# Patient Record
Sex: FEMALE | Race: WHITE
[De-identification: names, ages, dates, MRNs, and addresses within clinical notes are randomized per-mention and may not be internally consistent; named-entity substitution may affect disease eponyms.]

---

## 2021-05-06 ENCOUNTER — TRANSCRIPTION ENCOUNTER (OUTPATIENT)
Age: 31
End: 2021-05-06

## 2021-05-19 ENCOUNTER — EMERGENCY (EMERGENCY)
Facility: HOSPITAL | Age: 31
LOS: 0 days | Discharge: HOME | End: 2021-05-19
Attending: EMERGENCY MEDICINE
Payer: COMMERCIAL

## 2021-05-19 VITALS — HEIGHT: 63 IN | WEIGHT: 134.92 LBS

## 2021-05-19 VITALS
DIASTOLIC BLOOD PRESSURE: 79 MMHG | HEART RATE: 95 BPM | RESPIRATION RATE: 16 BRPM | OXYGEN SATURATION: 100 % | SYSTOLIC BLOOD PRESSURE: 142 MMHG | TEMPERATURE: 99 F

## 2021-05-19 DIAGNOSIS — M79.10 MYALGIA, UNSPECIFIED SITE: ICD-10-CM

## 2021-05-19 DIAGNOSIS — R53.83 OTHER FATIGUE: ICD-10-CM

## 2021-05-19 LAB
ALBUMIN SERPL ELPH-MCNC: 4.3 G/DL — SIGNIFICANT CHANGE UP (ref 3.3–5)
ALP SERPL-CCNC: 32 U/L — LOW (ref 40–120)
ALT FLD-CCNC: 58 U/L — SIGNIFICANT CHANGE UP (ref 12–78)
ANION GAP SERPL CALC-SCNC: 16 MMOL/L — SIGNIFICANT CHANGE UP (ref 5–17)
APTT BLD: 26 SEC — LOW (ref 27.5–35.5)
AST SERPL-CCNC: 82 U/L — HIGH (ref 15–37)
BASOPHILS # BLD AUTO: 0.06 K/UL — SIGNIFICANT CHANGE UP (ref 0–0.2)
BASOPHILS NFR BLD AUTO: 1.2 % — SIGNIFICANT CHANGE UP (ref 0–2)
BILIRUB SERPL-MCNC: 0.6 MG/DL — SIGNIFICANT CHANGE UP (ref 0.2–1.2)
BUN SERPL-MCNC: 9 MG/DL — SIGNIFICANT CHANGE UP (ref 7–23)
CALCIUM SERPL-MCNC: 9 MG/DL — SIGNIFICANT CHANGE UP (ref 8.5–10.1)
CHLORIDE SERPL-SCNC: 105 MMOL/L — SIGNIFICANT CHANGE UP (ref 96–108)
CK SERPL-CCNC: 72 U/L — SIGNIFICANT CHANGE UP (ref 26–192)
CO2 SERPL-SCNC: 17 MMOL/L — LOW (ref 22–31)
CREAT SERPL-MCNC: 0.62 MG/DL — SIGNIFICANT CHANGE UP (ref 0.5–1.3)
EOSINOPHIL # BLD AUTO: 0.08 K/UL — SIGNIFICANT CHANGE UP (ref 0–0.5)
EOSINOPHIL NFR BLD AUTO: 1.6 % — SIGNIFICANT CHANGE UP (ref 0–6)
GLUCOSE SERPL-MCNC: 81 MG/DL — SIGNIFICANT CHANGE UP (ref 70–99)
HCG SERPL-ACNC: <1 MIU/ML — SIGNIFICANT CHANGE UP
HCT VFR BLD CALC: 36.9 % — SIGNIFICANT CHANGE UP (ref 34.5–45)
HGB BLD-MCNC: 12.8 G/DL — SIGNIFICANT CHANGE UP (ref 11.5–15.5)
IMM GRANULOCYTES NFR BLD AUTO: 0.2 % — SIGNIFICANT CHANGE UP (ref 0–1.5)
INR BLD: 1.04 RATIO — SIGNIFICANT CHANGE UP (ref 0.88–1.16)
LYMPHOCYTES # BLD AUTO: 1.16 K/UL — SIGNIFICANT CHANGE UP (ref 1–3.3)
LYMPHOCYTES # BLD AUTO: 22.7 % — SIGNIFICANT CHANGE UP (ref 13–44)
MCHC RBC-ENTMCNC: 34.7 GM/DL — SIGNIFICANT CHANGE UP (ref 32–36)
MCHC RBC-ENTMCNC: 34.7 PG — HIGH (ref 27–34)
MCV RBC AUTO: 100 FL — SIGNIFICANT CHANGE UP (ref 80–100)
MONOCYTES # BLD AUTO: 0.55 K/UL — SIGNIFICANT CHANGE UP (ref 0–0.9)
MONOCYTES NFR BLD AUTO: 10.8 % — SIGNIFICANT CHANGE UP (ref 2–14)
NEUTROPHILS # BLD AUTO: 3.24 K/UL — SIGNIFICANT CHANGE UP (ref 1.8–7.4)
NEUTROPHILS NFR BLD AUTO: 63.5 % — SIGNIFICANT CHANGE UP (ref 43–77)
PLATELET # BLD AUTO: 191 K/UL — SIGNIFICANT CHANGE UP (ref 150–400)
POTASSIUM SERPL-MCNC: 3.3 MMOL/L — LOW (ref 3.5–5.3)
POTASSIUM SERPL-SCNC: 3.3 MMOL/L — LOW (ref 3.5–5.3)
PROT SERPL-MCNC: 7.9 GM/DL — SIGNIFICANT CHANGE UP (ref 6–8.3)
PROTHROM AB SERPL-ACNC: 12.2 SEC — SIGNIFICANT CHANGE UP (ref 10.6–13.6)
RBC # BLD: 3.69 M/UL — LOW (ref 3.8–5.2)
RBC # FLD: 11.9 % — SIGNIFICANT CHANGE UP (ref 10.3–14.5)
SODIUM SERPL-SCNC: 138 MMOL/L — SIGNIFICANT CHANGE UP (ref 135–145)
TSH SERPL-MCNC: 1.62 UU/ML — SIGNIFICANT CHANGE UP (ref 0.34–4.82)
WBC # BLD: 5.1 K/UL — SIGNIFICANT CHANGE UP (ref 3.8–10.5)
WBC # FLD AUTO: 5.1 K/UL — SIGNIFICANT CHANGE UP (ref 3.8–10.5)

## 2021-05-19 RX ORDER — SODIUM CHLORIDE 9 MG/ML
1000 INJECTION INTRAMUSCULAR; INTRAVENOUS; SUBCUTANEOUS ONCE
Refills: 0 | Status: COMPLETED | OUTPATIENT
Start: 2021-05-19 | End: 2021-05-19

## 2021-05-19 RX ORDER — POTASSIUM CHLORIDE 20 MEQ
20 PACKET (EA) ORAL ONCE
Refills: 0 | Status: COMPLETED | OUTPATIENT
Start: 2021-05-19 | End: 2021-05-19

## 2021-05-19 RX ORDER — POTASSIUM CHLORIDE 20 MEQ
40 PACKET (EA) ORAL ONCE
Refills: 0 | Status: DISCONTINUED | OUTPATIENT
Start: 2021-05-19 | End: 2021-05-19

## 2021-05-19 RX ADMIN — Medication 20 MILLIEQUIVALENT(S): at 13:53

## 2021-05-19 RX ADMIN — SODIUM CHLORIDE 1000 MILLILITER(S): 9 INJECTION INTRAMUSCULAR; INTRAVENOUS; SUBCUTANEOUS at 13:02

## 2021-05-19 RX ADMIN — SODIUM CHLORIDE 1000 MILLILITER(S): 9 INJECTION INTRAMUSCULAR; INTRAVENOUS; SUBCUTANEOUS at 13:55

## 2021-05-19 NOTE — ED STATDOCS - PATIENT PORTAL LINK FT
You can access the FollowMyHealth Patient Portal offered by [facility] by registering at the following website: [email or web address]. By joining [facility]’s FollowMyHealth portal, you will also be able to view your health information using other applications (apps) compatible with our system.

## 2021-05-19 NOTE — ED STATDOCS - OBJECTIVE STATEMENT
32 y/o female with no significant PMHx presents to the ED for evaluation. Pt reports she received the second COVID vaccine on 5/1 and since then has not been feeling well. Pt c/o fatigue, body aches, chills. Also random bruising. No trauma. Denies rash. On birth control. No known tick exposure. No other complaints at this time. PCP: Dr. Stevens.

## 2021-05-19 NOTE — ED ADULT TRIAGE NOTE - CHIEF COMPLAINT QUOTE
Pt A/Ox3. Pt states "I received the 2nd COVID vaccine on 5/1 and have been experiencing aches, pain, chills, random bruising, and sweating." Pt denies fever or contact with COVID.

## 2021-05-19 NOTE — ED STATDOCS - PROGRESS NOTE DETAILS
30 y/o F with no PMHx presents to the ED for evaluation. Pt reports she received the second COVID vaccine on 5/1 and since then has not been feeling well. Pt c/o fatigue, body aches, chills. Also random bruising. No trauma. Denies rash. On birth control. No known tick exposure. No other complaints at this time. PCP: Dr. Stevens.  PE: lungs CTA b/l, heart RRR s1/s2, strength and sensation intact b/l UE and LE  Plan: labs, TSH, labs with K+ 3.3, supplemented other labs WNL, pt advised that tick disease panel sent out will be called with positive results.  Pt currently feeling well, will d/c home with outpt f/u with rheum, pt agreeable to d/c and plan of care  Jazmin Lambert PA-C

## 2021-05-19 NOTE — ED ADULT NURSE NOTE - OBJECTIVE STATEMENT
pt presents to ED c/o malaise, fatigue, body aches, chills x 2 weeks. received 2nd dose of COVID vaccine 5/1. + bruising w/ unknown etiology. denies fevers.

## 2021-05-19 NOTE — ED STATDOCS - NSFOLLOWUPINSTRUCTIONS_ED_ALL_ED_FT
Fatigue    WHAT YOU NEED TO KNOW:    Fatigue is mental and physical exhaustion that does not get better with rest. Fatigue may make daily activities difficult or cause extreme sleepiness. It is normal to feel tired sometimes, but long-term fatigue may be a sign of serious illness.    DISCHARGE INSTRUCTIONS:    Return to the emergency department if:   •You have chest pain.       •You have difficulty breathing.       Contact your healthcare provider if:   •You have a cough that gets worse, or does not go away.       •You see blood in your urine or bowel movement.       •You have numbness or tingling around your mouth or in an arm or leg.       •You faint, feel dizzy, or have vision changes.       •You have swelling in your lymph nodes.       •You are a woman and have vaginal bleeding that is not normal for you, or is not expected.       •You lose weight without trying, or you have trouble eating.       •You feel weak or have muscle pain.       •You have pain or swelling in your joints.      •You have questions or concerns about your condition or care.       Follow up with your healthcare provider as directed: You may need more tests. Your healthcare provider may also refer you to a specialist. Write down your questions so you remember to ask them during your visits.    Manage fatigue:   •Keep a fatigue diary. Include anything that makes you feel more tired or less tired. Bring the diary with you to follow-up visits with your provider.      •Exercise as directed. Exercise can help you feel more alert. Exercise can also help you manage stress or relieve depression. Try to get at least 30 minutes of exercise most days of the week.  [employer] for Exercise           •Keep a regular sleep schedule. Go to bed and wake up at the same times every day. Limit naps to 1 hour each day. A nap can improve fatigue, but a long nap may make it harder to go to sleep at night.      •Plan and limit your activities. Limit the number of activities such as shopping and cleaning you do each day. If possible, try to spread out your trips throughout the week. Plan ahead so you are not rushing to get something done. Only do activities that you have the energy to complete. Take breaks between activities. Ask for help if you need it. Another person may be able to drive you or help with daily activities.      •Eat a variety of healthy foods. Healthy foods include fruits, vegetables, whole-grain breads, low-fat dairy products, beans, lean meats, and fish. Good nutrition can help manage fatigue.  Healthy Foods           •Limit caffeine and alcohol. These can make it difficult to fall or stay asleep. Women should limit alcohol to 1 drink a day. Men should limit alcohol to 2 drinks a day. A drink of alcohol is 12 ounces of beer, 5 ounces of wine, or 1½ ounces of liquor. Ask our healthcare provider how much caffeine is safe for you.      •Do not smoke. Nicotine and other chemicals in cigarettes and cigars can cause lung damage and increase fatigue. Ask your healthcare provider for information if you currently smoke and need help to quit. E-cigarettes or smokeless tobacco still contain nicotine. Talk to your healthcare provider before you use these products.

## 2021-05-21 LAB
A PHAGOCYTOPH IGG TITR SER IF: SIGNIFICANT CHANGE UP TITER
B BURGDOR AB SER QL IA: NEGATIVE — SIGNIFICANT CHANGE UP
B MICROTI IGG TITR SER: SIGNIFICANT CHANGE UP TITER
E CHAFFEENSIS IGG TITR SER IF: SIGNIFICANT CHANGE UP TITER

## 2021-05-24 PROBLEM — Z78.9 OTHER SPECIFIED HEALTH STATUS: Chronic | Status: ACTIVE | Noted: 2021-05-20

## 2021-05-25 PROBLEM — Z00.00 ENCOUNTER FOR PREVENTIVE HEALTH EXAMINATION: Status: ACTIVE | Noted: 2021-05-25

## 2021-05-26 ENCOUNTER — RESULT REVIEW (OUTPATIENT)
Age: 31
End: 2021-05-26

## 2021-05-26 ENCOUNTER — LABORATORY RESULT (OUTPATIENT)
Age: 31
End: 2021-05-26

## 2021-05-26 ENCOUNTER — NON-APPOINTMENT (OUTPATIENT)
Age: 31
End: 2021-05-26

## 2021-05-26 ENCOUNTER — APPOINTMENT (OUTPATIENT)
Dept: RHEUMATOLOGY | Facility: CLINIC | Age: 31
End: 2021-05-26
Payer: COMMERCIAL

## 2021-05-26 VITALS
WEIGHT: 135 LBS | HEART RATE: 98 BPM | OXYGEN SATURATION: 98 % | SYSTOLIC BLOOD PRESSURE: 138 MMHG | DIASTOLIC BLOOD PRESSURE: 89 MMHG | TEMPERATURE: 98 F

## 2021-05-26 DIAGNOSIS — M79.675 PAIN IN RIGHT TOE(S): ICD-10-CM

## 2021-05-26 DIAGNOSIS — Z78.9 OTHER SPECIFIED HEALTH STATUS: ICD-10-CM

## 2021-05-26 DIAGNOSIS — E73.9 LACTOSE INTOLERANCE, UNSPECIFIED: ICD-10-CM

## 2021-05-26 DIAGNOSIS — M25.50 PAIN IN UNSPECIFIED JOINT: ICD-10-CM

## 2021-05-26 DIAGNOSIS — M19.049 PRIMARY OSTEOARTHRITIS, UNSPECIFIED HAND: ICD-10-CM

## 2021-05-26 DIAGNOSIS — R14.0 ABDOMINAL DISTENSION (GASEOUS): ICD-10-CM

## 2021-05-26 DIAGNOSIS — R63.0 ANOREXIA: ICD-10-CM

## 2021-05-26 DIAGNOSIS — M54.5 LOW BACK PAIN: ICD-10-CM

## 2021-05-26 DIAGNOSIS — R68.2 DRY MOUTH, UNSPECIFIED: ICD-10-CM

## 2021-05-26 DIAGNOSIS — R61 GENERALIZED HYPERHIDROSIS: ICD-10-CM

## 2021-05-26 DIAGNOSIS — M79.674 PAIN IN RIGHT TOE(S): ICD-10-CM

## 2021-05-26 DIAGNOSIS — Z87.19 PERSONAL HISTORY OF OTHER DISEASES OF THE DIGESTIVE SYSTEM: ICD-10-CM

## 2021-05-26 DIAGNOSIS — Z82.0 FAMILY HISTORY OF EPILEPSY AND OTHER DISEASES OF THE NERVOUS SYSTEM: ICD-10-CM

## 2021-05-26 RX ORDER — LEVONORGESTREL AND ETHINYL ESTRADIOL AND ETHINYL ESTRADIOL 150-30(84)
KIT ORAL
Refills: 0 | Status: ACTIVE | COMMUNITY

## 2021-05-26 NOTE — PHYSICAL EXAM
[General Appearance - Alert] : alert [General Appearance - In No Acute Distress] : in no acute distress [Sclera] : the sclera and conjunctiva were normal [Extraocular Movements] : extraocular movements were intact [Outer Ear] : the ears and nose were normal in appearance [Neck Appearance] : the appearance of the neck was normal [Respiration, Rhythm And Depth] : normal respiratory rhythm and effort [Heart Rate And Rhythm] : heart rate was normal and rhythm regular [Heart Sounds] : normal S1 and S2 [Abdomen Soft] : soft [Abdomen Tenderness] : non-tender [Cervical Lymph Nodes Enlarged Anterior Bilaterally] : anterior cervical [Supraclavicular Lymph Nodes Enlarged Bilaterally] : supraclavicular [No CVA Tenderness] : no ~M costovertebral angle tenderness [Motor Tone] : muscle strength and tone were normal [] : no rash [Deep Tendon Reflexes (DTR)] : deep tendon reflexes were 2+ and symmetric [Sensation] : the sensory exam was normal to light touch and pinprick [Impaired Insight] : insight and judgment were intact [Mood] : the mood was normal [FreeTextEntry1] : no synovitis or effusion on exam noted today; full ROM in b/l shoulders; + multiple tender points incld sternum; upper/lower back;elbows;hips; medial knee,etc

## 2021-05-26 NOTE — ASSESSMENT
[FreeTextEntry1] : 31-year-old female, here for the first time w/ reports of generalized aches/pain all over w/ joint aches in the hands, feet and LBP to rule out inflammatory arthropathy incld RA, seronegative spondyloarthropathy.\par Will keep fibromyalgia as diagnosis of exclusion and does have some + tender points.\par Reports of daily night sweats w/ loss of appetite and given heme/onc referral as requested.  \par -No synovitis or effusion on exam noted today and advised to monitor.\par -labs as below incld ESR, CRP, serologies, lyme, TSH, etc \par -Referred for xray of b/l hands to evaluate for structural changes, r/o periarticular osteopenia/RA, r/o erosions\par -Referred for xray of b/l feet to evaluate for structural changes, r/o erosions\par \par LBP: intermittent \par -Referred for xray of LS spine to evaluate for structural changes, DDD, confirm SI normal \par -Advil PRN w/ food needed sparingly helps\par \par Abdominal bloating: check celiac panel\par -reports hx of IBS\par \par Intermittent numbness/tingling: intermittent in hands. Advised to monitor for CTS symptoms in the hands.\par -check metabolic labs incld folate, B12, TSH, HbA1C for it.\par -if persistent can consider EMG w/ Neuro\par \par Fibromyalgia: + tender points present \par -will plan for gabapentin or cymbalta pending labs\par -encouraged gentle exercises as tolerated\par \par -educated on symptoms to monitor for in detail and alert us if any concerns.\par -knows to stay up to date on health maintenance w/ PCP\par -f/u in 10-14 days w/ labs, xrays please\par

## 2021-05-26 NOTE — HISTORY OF PRESENT ILLNESS
[FreeTextEntry1] : 31-year-old female here for the first time. Patient states she notices generalized aches and pains all over and more so for the past month.\par Patient states she notices aches and pains throughout both of her hands on a daily basis.\par States she'll monitor for any swellling.\par States she'll monitor for any stiffness.\par States she notices intermittent pain in both of her feet.\par States she notices lower back pain worse with standing sitting or bending; better with resting. Denies any loss of bladder or bowel incontinence or saddle anesthesias.\par Reports she can notice some intermittent tingling in her hands and will monitor if it spares the fifth digit as seen in carpal tunnel syndrome.\par States she notices abdominal bloating. States she does have history of IBS. Denies any history of IBD or blood in the stools.\par States she notices daily night sweats. States she notices loss of appetite. Denies any fevers per se and has been checking she reports.\par Denies any fever/chills, no rashes, no ulcers, no dry eyes, + dry mouth at times, no raynaud's, no infectious diarrhea or  symptoms at this time.\par

## 2021-06-04 LAB
25(OH)D3 SERPL-MCNC: 24.2 NG/ML
ACE BLD-CCNC: 27 U/L
ALBUMIN SERPL ELPH-MCNC: 5.3 G/DL
ALP BLD-CCNC: 32 U/L
ALT SERPL-CCNC: 42 U/L
ANA SER IF-ACNC: NEGATIVE
ANION GAP SERPL CALC-SCNC: 19 MMOL/L
AST SERPL-CCNC: 64 U/L
B BURGDOR AB SER-IMP: NEGATIVE
B BURGDOR IGM PATRN SER IB-IMP: NEGATIVE
B BURGDOR18KD IGG SER QL IB: NORMAL
B BURGDOR23KD IGG SER QL IB: NORMAL
B BURGDOR23KD IGM SER QL IB: NORMAL
B BURGDOR28KD IGG SER QL IB: NORMAL
B BURGDOR30KD IGG SER QL IB: NORMAL
B BURGDOR31KD IGG SER QL IB: NORMAL
B BURGDOR39KD IGG SER QL IB: NORMAL
B BURGDOR39KD IGM SER QL IB: NORMAL
B BURGDOR41KD IGG SER QL IB: PRESENT
B BURGDOR41KD IGM SER QL IB: PRESENT
B BURGDOR45KD IGG SER QL IB: NORMAL
B BURGDOR58KD IGG SER QL IB: NORMAL
B BURGDOR66KD IGG SER QL IB: NORMAL
B BURGDOR93KD IGG SER QL IB: NORMAL
B19V IGG SER QL IA: 8.05 INDEX
B19V IGG+IGM SER-IMP: NORMAL
B19V IGG+IGM SER-IMP: POSITIVE
B19V IGM FLD-ACNC: 0.11 INDEX
B19V IGM SER-ACNC: NEGATIVE
BASOPHILS # BLD AUTO: 0.08 K/UL
BASOPHILS NFR BLD AUTO: 1.6 %
BILIRUB SERPL-MCNC: 0.4 MG/DL
BUN SERPL-MCNC: 9 MG/DL
C TRACH RRNA SPEC QL NAA+PROBE: NOT DETECTED
CALCIUM SERPL-MCNC: 9.1 MG/DL
CCP AB SER IA-ACNC: <8 UNITS
CHLORIDE SERPL-SCNC: 101 MMOL/L
CK SERPL-CCNC: 88 U/L
CO2 SERPL-SCNC: 17 MMOL/L
CREAT SERPL-MCNC: 0.45 MG/DL
CRP SERPL-MCNC: <3 MG/L
ENA RNP AB SER IA-ACNC: <0.2 AL
ENA SM AB SER IA-ACNC: <0.2 AL
ENA SS-A AB SER IA-ACNC: <0.2 AL
ENA SS-B AB SER IA-ACNC: <0.2 AL
ENDOMYSIUM IGA SER QL: NEGATIVE
ENDOMYSIUM IGA TITR SER: NORMAL
EOSINOPHIL # BLD AUTO: 0.1 K/UL
EOSINOPHIL NFR BLD AUTO: 2 %
ERYTHROCYTE [SEDIMENTATION RATE] IN BLOOD BY WESTERGREN METHOD: 2 MM/HR
ESTIMATED AVERAGE GLUCOSE: 97 MG/DL
FOLATE SERPL-MCNC: 2.5 NG/ML
G6PD SER-CCNC: 13.5 U/G HGB
GLIADIN IGA SER QL: 9.4 UNITS
GLIADIN IGG SER QL: <5 UNITS
GLIADIN PEPTIDE IGA SER-ACNC: NEGATIVE
GLIADIN PEPTIDE IGG SER-ACNC: NEGATIVE
GLUCOSE SERPL-MCNC: 89 MG/DL
HAV IGM SER QL: NONREACTIVE
HBA1C MFR BLD HPLC: 5 %
HBV CORE IGM SER QL: NONREACTIVE
HBV SURFACE AG SER QL: NONREACTIVE
HCT VFR BLD CALC: 38.9 %
HCV AB SER QL: NONREACTIVE
HCV S/CO RATIO: 0.38 S/CO
HGB BLD-MCNC: 12.8 G/DL
IMM GRANULOCYTES NFR BLD AUTO: 0.2 %
LYMPHOCYTES # BLD AUTO: 1.35 K/UL
LYMPHOCYTES NFR BLD AUTO: 26.7 %
MAN DIFF?: NORMAL
MCHC RBC-ENTMCNC: 32.9 GM/DL
MCHC RBC-ENTMCNC: 35.1 PG
MCV RBC AUTO: 106.6 FL
MONOCYTES # BLD AUTO: 0.53 K/UL
MONOCYTES NFR BLD AUTO: 10.5 %
N GONORRHOEA RRNA SPEC QL NAA+PROBE: NOT DETECTED
NEUTROPHILS # BLD AUTO: 2.98 K/UL
NEUTROPHILS NFR BLD AUTO: 59 %
PLATELET # BLD AUTO: 207 K/UL
POTASSIUM SERPL-SCNC: 3.9 MMOL/L
PROT SERPL-MCNC: 7.7 G/DL
RBC # BLD: 3.65 M/UL
RBC # FLD: 12.8 %
RF+CCP IGG SER-IMP: NEGATIVE
RHEUMATOID FACT SER QL: <10 IU/ML
SODIUM SERPL-SCNC: 137 MMOL/L
SOURCE AMPLIFICATION: NORMAL
TSH SERPL-ACNC: 2.83 UIU/ML
TTG IGA SER IA-ACNC: <1.2 U/ML
TTG IGA SER-ACNC: NEGATIVE
TTG IGG SER IA-ACNC: 1.3 U/ML
TTG IGG SER IA-ACNC: NEGATIVE
VIT B12 SERPL-MCNC: 167 PG/ML
WBC # FLD AUTO: 5.05 K/UL

## 2021-06-08 ENCOUNTER — APPOINTMENT (OUTPATIENT)
Dept: RADIOLOGY | Facility: CLINIC | Age: 31
End: 2021-06-08
Payer: COMMERCIAL

## 2021-06-08 ENCOUNTER — OUTPATIENT (OUTPATIENT)
Dept: OUTPATIENT SERVICES | Facility: HOSPITAL | Age: 31
LOS: 1 days | End: 2021-06-08
Payer: COMMERCIAL

## 2021-06-08 DIAGNOSIS — M19.049 PRIMARY OSTEOARTHRITIS, UNSPECIFIED HAND: ICD-10-CM

## 2021-06-08 DIAGNOSIS — M54.5 LOW BACK PAIN: ICD-10-CM

## 2021-06-09 LAB — HLA-B27 RELATED AG QL: NEGATIVE

## 2021-06-14 ENCOUNTER — APPOINTMENT (OUTPATIENT)
Dept: RHEUMATOLOGY | Facility: CLINIC | Age: 31
End: 2021-06-14
Payer: COMMERCIAL

## 2021-06-14 VITALS
HEIGHT: 63 IN | WEIGHT: 135 LBS | SYSTOLIC BLOOD PRESSURE: 122 MMHG | BODY MASS INDEX: 23.92 KG/M2 | OXYGEN SATURATION: 96 % | HEART RATE: 103 BPM | TEMPERATURE: 96.6 F | DIASTOLIC BLOOD PRESSURE: 80 MMHG

## 2021-06-14 DIAGNOSIS — E53.8 DEFICIENCY OF OTHER SPECIFIED B GROUP VITAMINS: ICD-10-CM

## 2021-06-14 DIAGNOSIS — R74.01 ELEVATION OF LEVELS OF LIVER TRANSAMINASE LEVELS: ICD-10-CM

## 2021-06-14 RX ORDER — ERGOCALCIFEROL 1.25 MG/1
1.25 MG CAPSULE, LIQUID FILLED ORAL
Qty: 4 | Refills: 1 | Status: ACTIVE | COMMUNITY
Start: 2021-06-14 | End: 1900-01-01

## 2021-06-14 NOTE — REASON FOR VISIT
[Follow-Up: _____] : a [unfilled] follow-up visit [FreeTextEntry1] : generalized aches/pain; review labs/xrays/meds

## 2021-06-14 NOTE — ASSESSMENT
[FreeTextEntry1] : 31-year-old female, here for first follow up w/ hx of IBS reports of generalized aches/pain all over w/ +tender points of fibromyalgia at this time.\par She is noted to have transaminitis, Low B12, low folate and low vit D on recent labs\par -No synovitis or effusion on exam noted today and advised to monitor.\par -reviewed labs 5/26/2021 w/ low B12= 167; low folate= 2.5; low vitD =24.2; high AST=64 (from 40); Nl CPK; serologies all within normal limits at this time \par -reviewed xray b/l hands 6/8/21 normal\par -reviewed xray b/l feet 6/8/21 normal \par -reviewed xray LS spine 6/8/2021 normal; SI normal \par \par Abdominal bloating: reviewed celiac panel normal\par -reports hx of IBS\par \par Intermittent numbness/tingling: intermittent in hands. Advised to monitor for CTS symptoms in the hands.\par -reviewed metabolic labs 5/26/2021 w/ low folate, low B12, Nl TSH, NL HbA1C for it.\par -if persistent can consider EMG w/ Neuro\par \par Low B12 <400 on labs\par -discussed r/b/s of B12 2500mcg PO daily w/ pt agreeable and prescription sent as below; discussed if still low she can consider IM injections w/ her PCP\par \par low folate: discussed r/b/s of folate 1 mg daily and will monitor level on f/u then\par \par Hypovitaminosis D: low vitD w/ prescription for vitD repletion sent as discussed.\par \par Transaminitis: raised AST=64\par -asymptomatic \par -discussed to stop tylenol and NSAIDs and alcohol and repeat off it \par \par Fibromyalgia: + tender points present \par -will plan for gabapentin or cymbalta pending labs\par -encouraged gentle exercises as tolerated\par \par -educated on symptoms to monitor for in detail and alert us if any concerns.\par -knows to stay up to date on health maintenance w/ PCP\par -f/u in 4-6 weeks w/ labs or sooner as needed \par . \par

## 2021-06-14 NOTE — HISTORY OF PRESENT ILLNESS
[FreeTextEntry1] : 31-year-old female here for first follow up to review labs and xrays. Patient states she notices generalized aches and pains all over and more so for the past few months.\par States she was taking tylenol and Advil for pain. States she drinks alcohol once a week on the weekends. Denies any RUQ pain; no N/V. \par Denies any swelling or redness or warmth of any joints.\par States she notices intermittent pain in both of her feet.\par States she notices lower back pain worse with standing sitting or bending; better with resting. Denies any loss of bladder or bowel incontinence or saddle anesthesias.\par Reports she can notice some intermittent tingling in her hands and will monitor if it spares the fifth digit as seen in carpal tunnel syndrome.\par States she notices abdominal bloating. States she does have history of IBS. Denies any history of IBD or blood in the stools.\par Denies any fever/chills, no rashes, no ulcers, no dry eyes, + dry mouth at times, no raynaud's, no infectious diarrhea or  symptoms at this time.\par \par

## 2021-08-03 LAB
25(OH)D3 SERPL-MCNC: 28 NG/ML
ALBUMIN SERPL ELPH-MCNC: 5 G/DL
ALP BLD-CCNC: 33 U/L
ALT SERPL-CCNC: 19 U/L
ANION GAP SERPL CALC-SCNC: 16 MMOL/L
AST SERPL-CCNC: 28 U/L
BILIRUB SERPL-MCNC: 0.4 MG/DL
BUN SERPL-MCNC: 6 MG/DL
CALCIUM SERPL-MCNC: 10 MG/DL
CHLORIDE SERPL-SCNC: 103 MMOL/L
CK SERPL-CCNC: 82 U/L
CO2 SERPL-SCNC: 20 MMOL/L
CREAT SERPL-MCNC: 0.58 MG/DL
FOLATE SERPL-MCNC: >20 NG/ML
GLUCOSE SERPL-MCNC: 104 MG/DL
POTASSIUM SERPL-SCNC: 4.3 MMOL/L
PROT SERPL-MCNC: 7.6 G/DL
SODIUM SERPL-SCNC: 138 MMOL/L
VIT B12 SERPL-MCNC: 741 PG/ML

## 2021-08-04 ENCOUNTER — APPOINTMENT (OUTPATIENT)
Dept: RHEUMATOLOGY | Facility: CLINIC | Age: 31
End: 2021-08-04
Payer: COMMERCIAL

## 2021-08-04 VITALS
HEART RATE: 101 BPM | WEIGHT: 135 LBS | HEIGHT: 63 IN | DIASTOLIC BLOOD PRESSURE: 93 MMHG | OXYGEN SATURATION: 99 % | TEMPERATURE: 97.1 F | BODY MASS INDEX: 23.92 KG/M2 | SYSTOLIC BLOOD PRESSURE: 150 MMHG

## 2021-08-04 DIAGNOSIS — E55.9 VITAMIN D DEFICIENCY, UNSPECIFIED: ICD-10-CM

## 2021-08-04 DIAGNOSIS — R20.0 ANESTHESIA OF SKIN: ICD-10-CM

## 2021-08-04 DIAGNOSIS — M79.7 FIBROMYALGIA: ICD-10-CM

## 2021-08-04 DIAGNOSIS — R20.2 ANESTHESIA OF SKIN: ICD-10-CM

## 2021-08-04 RX ORDER — CYANOCOBALAMIN (VITAMIN B-12) 2500 MCG
2500 TABLET, SUBLINGUAL SUBLINGUAL
Qty: 30 | Refills: 1 | Status: DISCONTINUED | COMMUNITY
Start: 2021-06-14 | End: 2021-08-04

## 2021-08-04 RX ORDER — FOLIC ACID 1 MG/1
1 TABLET ORAL DAILY
Qty: 30 | Refills: 1 | Status: DISCONTINUED | COMMUNITY
Start: 2021-06-14 | End: 2021-08-04

## 2021-08-04 RX ORDER — ERGOCALCIFEROL 1.25 MG/1
1.25 MG CAPSULE, LIQUID FILLED ORAL
Qty: 4 | Refills: 1 | Status: ACTIVE | COMMUNITY
Start: 2021-08-04 | End: 1900-01-01

## 2021-08-04 RX ORDER — GABAPENTIN 100 MG/1
100 CAPSULE ORAL
Qty: 90 | Refills: 1 | Status: ACTIVE | COMMUNITY
Start: 2021-08-04 | End: 1900-01-01

## 2021-08-04 NOTE — PHYSICAL EXAM
[General Appearance - Alert] : alert [General Appearance - In No Acute Distress] : in no acute distress [Sclera] : the sclera and conjunctiva were normal [Extraocular Movements] : extraocular movements were intact [Outer Ear] : the ears and nose were normal in appearance [Neck Appearance] : the appearance of the neck was normal [Respiration, Rhythm And Depth] : normal respiratory rhythm and effort [Heart Rate And Rhythm] : heart rate was normal and rhythm regular [Abdomen Soft] : soft [Heart Sounds] : normal S1 and S2 [Abdomen Tenderness] : non-tender [Cervical Lymph Nodes Enlarged Anterior Bilaterally] : anterior cervical [Supraclavicular Lymph Nodes Enlarged Bilaterally] : supraclavicular [No CVA Tenderness] : no ~M costovertebral angle tenderness [Motor Tone] : muscle strength and tone were normal [] : no rash [No Focal Deficits] : no focal deficits [Impaired Insight] : insight and judgment were intact [Mood] : the mood was normal [FreeTextEntry1] : no synovitis or effusion on exam noted today; + multiple tender points incld sternum; upper/lower back;elbows;hips; medial knee,etc

## 2021-08-04 NOTE — ASSESSMENT
[FreeTextEntry1] : 31-year-old female, here for first follow up w/ hx of IBS reports of generalized aches/pain all over w/ +tender points of fibromyalgia at this time.\par -No synovitis or effusion on exam noted today and advised to monitor.\par -reviewed labs 7/28/2021 w/ low vit D; normal B12/folate now; CMP ok; Nl CPK\par -labs 5/26/2021 w/ low B12= 167; low folate= 2.5; low vitD =24.2; high AST=64 (from 40); Nl CPK; serologies all within normal limits at this time \par -reviewed xray b/l hands 6/8/21 normal\par -reviewed xray b/l feet 6/8/21 normal \par -reviewed xray LS spine 6/8/2021 normal; SI normal \par \par Abdominal bloating: reviewed celiac panel normal\par -reports hx of IBS\par \par Intermittent numbness/tingling: intermittent\par -reviewed labs 7/28/2021 w/ normal B12/folate now \par -metabolic labs 5/26/2021 w/ low folate , low B12, Nl TSH, NL HbA1C for it.\par -if persistent can consider EMG w/ Neuro\par \par Hypovitaminosis D: low vitD w/ prescription for vitD repletion sent as discussed.\par \par Transaminitis: resolved now \par -asymptomatic \par -states she decreased tylenol and NSAIDs and alcohol \par \par Fibromyalgia: + tender points present \par -discussed r/b/s of gabapentin 300 mg bedtime w/ pt agreeable and prescription sent as below\par -encouraged gentle exercises as tolerated\par \par -educated on symptoms to monitor for in detail and alert us if any concerns.\par -knows to stay up to date on health maintenance w/ PCP\par -f/u in 6-8 weeks w/ labs or sooner as needed \par

## 2021-08-04 NOTE — HISTORY OF PRESENT ILLNESS
[FreeTextEntry1] : 31-year-old female here for follow up to review labs. Patient states she notices generalized aches and pains all over and more so for the past few months.\par States she did cut down on taking tylenol and NSAIDs and alcohol w/ liver enzyme normal now. \par Denies any swelling or redness or warmth of any joints.\par States she notices intermittent pain in both of her feet.\par States she notices intermittent lower back pain worse with standing sitting or bending; better with resting though not much lately. Denies any loss of bladder or bowel incontinence or saddle anesthesias.\par Reports she can notice some intermittent tingling in her hands and will monitor if it spares the fifth digit as seen in carpal tunnel syndrome.\par States she notices abdominal bloating. States she does have history of IBS. Denies any history of IBD or blood in the stools.\par Denies any fever/chills, no rashes, no ulcers, no dry eyes, + dry mouth at times, no raynaud's, no infectious diarrhea or  symptoms at this time.\par \par \par

## 2021-10-22 ENCOUNTER — TRANSCRIPTION ENCOUNTER (OUTPATIENT)
Age: 31
End: 2021-10-22

## 2021-12-08 ENCOUNTER — APPOINTMENT (OUTPATIENT)
Dept: RHEUMATOLOGY | Facility: CLINIC | Age: 31
End: 2021-12-08

## 2022-01-28 ENCOUNTER — APPOINTMENT (OUTPATIENT)
Dept: DERMATOLOGY | Facility: CLINIC | Age: 32
End: 2022-01-28

## 2022-03-16 ENCOUNTER — TRANSCRIPTION ENCOUNTER (OUTPATIENT)
Age: 32
End: 2022-03-16

## 2022-05-12 ENCOUNTER — NON-APPOINTMENT (OUTPATIENT)
Age: 32
End: 2022-05-12

## 2022-07-23 ENCOUNTER — INPATIENT (INPATIENT)
Facility: HOSPITAL | Age: 32
LOS: 5 days | Discharge: HOME | End: 2022-07-29
Attending: INTERNAL MEDICINE | Admitting: INTERNAL MEDICINE
Payer: MEDICAID

## 2022-07-23 VITALS
SYSTOLIC BLOOD PRESSURE: 131 MMHG | HEIGHT: 63 IN | HEART RATE: 111 BPM | OXYGEN SATURATION: 100 % | TEMPERATURE: 98 F | WEIGHT: 139.99 LBS | RESPIRATION RATE: 22 BRPM | DIASTOLIC BLOOD PRESSURE: 59 MMHG

## 2022-07-23 DIAGNOSIS — D69.6 THROMBOCYTOPENIA, UNSPECIFIED: ICD-10-CM

## 2022-07-23 DIAGNOSIS — E88.89 OTHER SPECIFIED METABOLIC DISORDERS: ICD-10-CM

## 2022-07-23 DIAGNOSIS — F10.231 ALCOHOL DEPENDENCE WITH WITHDRAWAL DELIRIUM: ICD-10-CM

## 2022-07-23 DIAGNOSIS — E83.42 HYPOMAGNESEMIA: ICD-10-CM

## 2022-07-23 DIAGNOSIS — R74.01 ELEVATION OF LEVELS OF LIVER TRANSAMINASE LEVELS: ICD-10-CM

## 2022-07-23 DIAGNOSIS — Z20.822 CONTACT WITH AND (SUSPECTED) EXPOSURE TO COVID-19: ICD-10-CM

## 2022-07-23 DIAGNOSIS — G93.41 METABOLIC ENCEPHALOPATHY: ICD-10-CM

## 2022-07-23 DIAGNOSIS — E43 UNSPECIFIED SEVERE PROTEIN-CALORIE MALNUTRITION: ICD-10-CM

## 2022-07-23 DIAGNOSIS — E87.6 HYPOKALEMIA: ICD-10-CM

## 2022-07-23 DIAGNOSIS — F10.239 ALCOHOL DEPENDENCE WITH WITHDRAWAL, UNSPECIFIED: ICD-10-CM

## 2022-07-23 LAB
ALBUMIN SERPL ELPH-MCNC: 4.2 G/DL — SIGNIFICANT CHANGE UP (ref 3.3–5)
ALP SERPL-CCNC: 48 U/L — SIGNIFICANT CHANGE UP (ref 40–120)
ALT FLD-CCNC: 127 U/L — HIGH (ref 12–78)
ANION GAP SERPL CALC-SCNC: 11 MMOL/L — SIGNIFICANT CHANGE UP (ref 5–17)
APAP SERPL-MCNC: <2 UG/ML — LOW (ref 10–30)
APPEARANCE UR: CLEAR — SIGNIFICANT CHANGE UP
AST SERPL-CCNC: 373 U/L — HIGH (ref 15–37)
BASOPHILS # BLD AUTO: 0.03 K/UL — SIGNIFICANT CHANGE UP (ref 0–0.2)
BASOPHILS NFR BLD AUTO: 0.9 % — SIGNIFICANT CHANGE UP (ref 0–2)
BILIRUB SERPL-MCNC: 2.1 MG/DL — HIGH (ref 0.2–1.2)
BILIRUB UR-MCNC: ABNORMAL
BUN SERPL-MCNC: 4 MG/DL — LOW (ref 7–23)
CALCIUM SERPL-MCNC: 8.8 MG/DL — SIGNIFICANT CHANGE UP (ref 8.5–10.1)
CHLORIDE SERPL-SCNC: 98 MMOL/L — SIGNIFICANT CHANGE UP (ref 96–108)
CO2 SERPL-SCNC: 24 MMOL/L — SIGNIFICANT CHANGE UP (ref 22–31)
COLOR SPEC: YELLOW — SIGNIFICANT CHANGE UP
CREAT SERPL-MCNC: 0.49 MG/DL — LOW (ref 0.5–1.3)
DIFF PNL FLD: ABNORMAL
EGFR: 128 ML/MIN/1.73M2 — SIGNIFICANT CHANGE UP
EOSINOPHIL # BLD AUTO: 0.06 K/UL — SIGNIFICANT CHANGE UP (ref 0–0.5)
EOSINOPHIL NFR BLD AUTO: 1.7 % — SIGNIFICANT CHANGE UP (ref 0–6)
ETHANOL SERPL-MCNC: <10 MG/DL — SIGNIFICANT CHANGE UP (ref 0–10)
GLUCOSE SERPL-MCNC: 126 MG/DL — HIGH (ref 70–99)
GLUCOSE UR QL: NEGATIVE — SIGNIFICANT CHANGE UP
HCT VFR BLD CALC: 34.2 % — LOW (ref 34.5–45)
HGB BLD-MCNC: 12.1 G/DL — SIGNIFICANT CHANGE UP (ref 11.5–15.5)
IMM GRANULOCYTES NFR BLD AUTO: 0.6 % — SIGNIFICANT CHANGE UP (ref 0–1.5)
KETONES UR-MCNC: ABNORMAL
LACTATE SERPL-SCNC: 1.4 MMOL/L — SIGNIFICANT CHANGE UP (ref 0.7–2)
LEUKOCYTE ESTERASE UR-ACNC: ABNORMAL
LYMPHOCYTES # BLD AUTO: 0.49 K/UL — LOW (ref 1–3.3)
LYMPHOCYTES # BLD AUTO: 14.1 % — SIGNIFICANT CHANGE UP (ref 13–44)
MAGNESIUM SERPL-MCNC: 1 MG/DL — CRITICAL LOW (ref 1.6–2.6)
MCHC RBC-ENTMCNC: 35.4 GM/DL — SIGNIFICANT CHANGE UP (ref 32–36)
MCHC RBC-ENTMCNC: 37.5 PG — HIGH (ref 27–34)
MCV RBC AUTO: 105.9 FL — HIGH (ref 80–100)
MONOCYTES # BLD AUTO: 0.33 K/UL — SIGNIFICANT CHANGE UP (ref 0–0.9)
MONOCYTES NFR BLD AUTO: 9.5 % — SIGNIFICANT CHANGE UP (ref 2–14)
NEUTROPHILS # BLD AUTO: 2.55 K/UL — SIGNIFICANT CHANGE UP (ref 1.8–7.4)
NEUTROPHILS NFR BLD AUTO: 73.2 % — SIGNIFICANT CHANGE UP (ref 43–77)
NITRITE UR-MCNC: NEGATIVE — SIGNIFICANT CHANGE UP
PH UR: 6.5 — SIGNIFICANT CHANGE UP (ref 5–8)
PLATELET # BLD AUTO: 131 K/UL — LOW (ref 150–400)
POTASSIUM SERPL-MCNC: 3.5 MMOL/L — SIGNIFICANT CHANGE UP (ref 3.5–5.3)
POTASSIUM SERPL-SCNC: 3.5 MMOL/L — SIGNIFICANT CHANGE UP (ref 3.5–5.3)
PROT SERPL-MCNC: 7.7 GM/DL — SIGNIFICANT CHANGE UP (ref 6–8.3)
PROT UR-MCNC: 30 MG/DL
RBC # BLD: 3.23 M/UL — LOW (ref 3.8–5.2)
RBC # FLD: 11.6 % — SIGNIFICANT CHANGE UP (ref 10.3–14.5)
SALICYLATES SERPL-MCNC: <1.7 MG/DL — LOW (ref 2.8–20)
SODIUM SERPL-SCNC: 133 MMOL/L — LOW (ref 135–145)
SP GR SPEC: 1.01 — SIGNIFICANT CHANGE UP (ref 1.01–1.02)
TSH SERPL-MCNC: 4.71 UU/ML — SIGNIFICANT CHANGE UP (ref 0.34–4.82)
UROBILINOGEN FLD QL: 4
WBC # BLD: 3.48 K/UL — LOW (ref 3.8–10.5)
WBC # FLD AUTO: 3.48 K/UL — LOW (ref 3.8–10.5)

## 2022-07-23 RX ORDER — MAGNESIUM SULFATE 500 MG/ML
2 VIAL (ML) INJECTION ONCE
Refills: 0 | Status: COMPLETED | OUTPATIENT
Start: 2022-07-23 | End: 2022-07-23

## 2022-07-23 RX ORDER — ONDANSETRON 8 MG/1
4 TABLET, FILM COATED ORAL EVERY 6 HOURS
Refills: 0 | Status: DISCONTINUED | OUTPATIENT
Start: 2022-07-23 | End: 2022-07-24

## 2022-07-23 RX ORDER — CEFTRIAXONE 500 MG/1
1000 INJECTION, POWDER, FOR SOLUTION INTRAMUSCULAR; INTRAVENOUS ONCE
Refills: 0 | Status: COMPLETED | OUTPATIENT
Start: 2022-07-23 | End: 2022-07-23

## 2022-07-23 RX ORDER — FOLIC ACID 0.8 MG
1 TABLET ORAL DAILY
Refills: 0 | Status: DISCONTINUED | OUTPATIENT
Start: 2022-07-23 | End: 2022-07-24

## 2022-07-23 RX ORDER — ACETAMINOPHEN 500 MG
650 TABLET ORAL EVERY 6 HOURS
Refills: 0 | Status: DISCONTINUED | OUTPATIENT
Start: 2022-07-23 | End: 2022-07-24

## 2022-07-23 RX ORDER — SODIUM CHLORIDE 9 MG/ML
1000 INJECTION INTRAMUSCULAR; INTRAVENOUS; SUBCUTANEOUS ONCE
Refills: 0 | Status: COMPLETED | OUTPATIENT
Start: 2022-07-23 | End: 2022-07-23

## 2022-07-23 RX ORDER — THIAMINE MONONITRATE (VIT B1) 100 MG
100 TABLET ORAL DAILY
Refills: 0 | Status: DISCONTINUED | OUTPATIENT
Start: 2022-07-23 | End: 2022-07-24

## 2022-07-23 RX ORDER — KETOROLAC TROMETHAMINE 30 MG/ML
15 SYRINGE (ML) INJECTION ONCE
Refills: 0 | Status: DISCONTINUED | OUTPATIENT
Start: 2022-07-23 | End: 2022-07-23

## 2022-07-23 RX ADMIN — Medication 2 GRAM(S): at 18:58

## 2022-07-23 RX ADMIN — SODIUM CHLORIDE 1000 MILLILITER(S): 9 INJECTION INTRAMUSCULAR; INTRAVENOUS; SUBCUTANEOUS at 17:17

## 2022-07-23 RX ADMIN — Medication 2 MILLIGRAM(S): at 20:01

## 2022-07-23 RX ADMIN — CEFTRIAXONE 100 MILLIGRAM(S): 500 INJECTION, POWDER, FOR SOLUTION INTRAMUSCULAR; INTRAVENOUS at 22:04

## 2022-07-23 RX ADMIN — SODIUM CHLORIDE 1000 MILLILITER(S): 9 INJECTION INTRAMUSCULAR; INTRAVENOUS; SUBCUTANEOUS at 18:58

## 2022-07-23 RX ADMIN — Medication 2 MILLIGRAM(S): at 17:17

## 2022-07-23 RX ADMIN — Medication 15 MILLIGRAM(S): at 18:22

## 2022-07-23 RX ADMIN — Medication 25 GRAM(S): at 18:22

## 2022-07-23 RX ADMIN — SODIUM CHLORIDE 1000 MILLILITER(S): 9 INJECTION INTRAMUSCULAR; INTRAVENOUS; SUBCUTANEOUS at 18:27

## 2022-07-23 RX ADMIN — Medication 15 MILLIGRAM(S): at 18:58

## 2022-07-23 NOTE — ED ADULT NURSE NOTE - OBJECTIVE STATEMENT
Patient reports leg pain for the past few months worsening today. Pt also reports uncontrollable shaking in her arms. Pt reports both legs hurt and have some tingling. Pt states she drinks about 5 glasses of wine every day for the past year and has not had anything to drink today.

## 2022-07-23 NOTE — ED PROVIDER NOTE - CARE PLAN
1 Principal Discharge DX:	Alcohol withdrawal syndrome  Secondary Diagnosis:	Hypomagnesemia  Secondary Diagnosis:	Thrombocytopenia  Secondary Diagnosis:	Bilateral leg weakness

## 2022-07-23 NOTE — ED PROVIDER NOTE - CLINICAL SUMMARY MEDICAL DECISION MAKING FREE TEXT BOX
PT with concern for EtOH withdrawal and b/l le weakness worsening over several weeks. Will treat for withdrawal. Labs, urine, CT head, reassess, likely admission for inability to ambulate, no concern for CVA.

## 2022-07-23 NOTE — ED PROVIDER NOTE - NEUROLOGICAL, MLM
Alert and oriented x3, no focal deficits, no motor or sensory deficits. +tremulous, b//l UE tremors and tongue fasciculation, b/l leg weakness, minimal strength against gravity b/l legs, sensation intact, patellar reflex intact b/l

## 2022-07-23 NOTE — ED ADULT NURSE NOTE - CHIEF COMPLAINT QUOTE
Patient brought in by EMS for fall out of bed. patient states she was sleeping and rolled thinking she had more room in the bed. patient also reports tremors and weakness after second COVID shot in may which she is being worked up by PCP for.

## 2022-07-23 NOTE — ED ADULT TRIAGE NOTE - CHIEF COMPLAINT QUOTE
Patient brought in by EMS for fall out of bed. patient states she was sleeping and rolled thinking she had more room in the bed. patient also reports tremors and weakness after second COVID shot which she is being worked up by PCP for. Patient brought in by EMS for fall out of bed. patient states she was sleeping and rolled thinking she had more room in the bed. patient also reports tremors and weakness after second COVID shot in may which she is being worked up by PCP for.

## 2022-07-23 NOTE — PATIENT PROFILE ADULT - FALL HARM RISK - HARM RISK INTERVENTIONS
Assistance with ambulation/Assistance OOB with selected safe patient handling equipment/Communicate Risk of Fall with Harm to all staff/Discuss with provider need for PT consult/Monitor for mental status changes/Monitor gait and stability/Provide patient with walking aids - walker, cane, crutches/Reinforce activity limits and safety measures with patient and family/Tailored Fall Risk Interventions/Toileting schedule using arm’s reach rule for commode and bathroom/Use of alarms - bed, chair and/or voice tab/Visual Cue: Yellow wristband and red socks/Bed in lowest position, wheels locked, appropriate side rails in place/Call bell, personal items and telephone in reach/Instruct patient to call for assistance before getting out of bed or chair/Non-slip footwear when patient is out of bed/[place] to call system/Physically safe environment - no spills, clutter or unnecessary equipment/Purposeful Proactive Rounding/Room/bathroom lighting operational, light cord in reach

## 2022-07-23 NOTE — ED PROVIDER NOTE - PROGRESS NOTE DETAILS
Pt remained mildly tachycardic, otherwise HDS. Responded somewhat to IVF and BZD for etoh w/d. CTH without acute issues, duplex legs neg. Lytes replleted, treated empirically for possibnle UTI as well given the tachycardia and generalized sx. Neurology consultation initiated for further workup of leg weakness, r/o GBS although felt to be less likely given normal reflexes on exam. LP held in ED until pt seen by neurology, may need MR imaging of LSpine. Pt admitted to medical service for etoh w/d syndrome with persistent tachycardia, as well as leg weakness, UTI, inability to ambulate

## 2022-07-23 NOTE — ED PROVIDER NOTE - OBJECTIVE STATEMENT
33 y/o female presents to the ED s/p fall out of bed today. Pt notes since her COVID vaccine on May 1st of 2021, she has had b/l leg pain and tingling for months, worsening over the past few weeks. Pt notes difficulty walking because "her legs were not moving." Pt s/p fall out of bed as she was rolling over and thought she had more room. No head injury. Pt needed to be assisted to get up. No nausea, no vomiting, no diarrhea, no fever, no abdominal pain. Pt notes she has been drinking multiple glasses of wine daily for months as well, last drink last night. No hx of withdrawals. Pt being followed by PCP for shakiness and weakness s/p vaccine, with no remarkable work up thus far. No recent travel. No recent illness.

## 2022-07-24 LAB
ALBUMIN SERPL ELPH-MCNC: 3.5 G/DL — SIGNIFICANT CHANGE UP (ref 3.3–5)
ALP SERPL-CCNC: 39 U/L — LOW (ref 40–120)
ALT FLD-CCNC: 97 U/L — HIGH (ref 12–78)
ANION GAP SERPL CALC-SCNC: 10 MMOL/L — SIGNIFICANT CHANGE UP (ref 5–17)
AST SERPL-CCNC: 251 U/L — HIGH (ref 15–37)
BILIRUB SERPL-MCNC: 1.7 MG/DL — HIGH (ref 0.2–1.2)
BUN SERPL-MCNC: 5 MG/DL — LOW (ref 7–23)
CALCIUM SERPL-MCNC: 8.5 MG/DL — SIGNIFICANT CHANGE UP (ref 8.5–10.1)
CHLORIDE SERPL-SCNC: 104 MMOL/L — SIGNIFICANT CHANGE UP (ref 96–108)
CK SERPL-CCNC: 102 U/L — SIGNIFICANT CHANGE UP (ref 26–192)
CO2 SERPL-SCNC: 23 MMOL/L — SIGNIFICANT CHANGE UP (ref 22–31)
CREAT SERPL-MCNC: 0.46 MG/DL — LOW (ref 0.5–1.3)
CRP SERPL-MCNC: <3 MG/L — SIGNIFICANT CHANGE UP
EGFR: 130 ML/MIN/1.73M2 — SIGNIFICANT CHANGE UP
ERYTHROCYTE [SEDIMENTATION RATE] IN BLOOD: 2 MM/HR — SIGNIFICANT CHANGE UP (ref 0–15)
FOLATE SERPL-MCNC: 2.3 NG/ML — LOW
GLUCOSE SERPL-MCNC: 82 MG/DL — SIGNIFICANT CHANGE UP (ref 70–99)
MAGNESIUM SERPL-MCNC: 1.9 MG/DL — SIGNIFICANT CHANGE UP (ref 1.6–2.6)
PCP SPEC-MCNC: SIGNIFICANT CHANGE UP
POTASSIUM SERPL-MCNC: 3.4 MMOL/L — LOW (ref 3.5–5.3)
POTASSIUM SERPL-SCNC: 3.4 MMOL/L — LOW (ref 3.5–5.3)
PROT SERPL-MCNC: 6.6 GM/DL — SIGNIFICANT CHANGE UP (ref 6–8.3)
PROT SERPL-MCNC: 6.7 G/DL — SIGNIFICANT CHANGE UP (ref 6–8.3)
PROT SERPL-MCNC: 6.7 G/DL — SIGNIFICANT CHANGE UP (ref 6–8.3)
RHEUMATOID FACT SERPL-ACNC: <10 IU/ML — SIGNIFICANT CHANGE UP (ref 0–13)
SODIUM SERPL-SCNC: 137 MMOL/L — SIGNIFICANT CHANGE UP (ref 135–145)
VIT B12 SERPL-MCNC: 348 PG/ML — SIGNIFICANT CHANGE UP (ref 232–1245)

## 2022-07-24 RX ORDER — ACETAMINOPHEN 500 MG
650 TABLET ORAL EVERY 6 HOURS
Refills: 0 | Status: DISCONTINUED | OUTPATIENT
Start: 2022-07-24 | End: 2022-07-29

## 2022-07-24 RX ORDER — THIAMINE MONONITRATE (VIT B1) 100 MG
100 TABLET ORAL DAILY
Refills: 0 | Status: DISCONTINUED | OUTPATIENT
Start: 2022-07-24 | End: 2022-07-24

## 2022-07-24 RX ORDER — ACETAMINOPHEN 500 MG
650 TABLET ORAL EVERY 6 HOURS
Refills: 0 | Status: DISCONTINUED | OUTPATIENT
Start: 2022-07-24 | End: 2022-07-24

## 2022-07-24 RX ORDER — FOLIC ACID 0.8 MG
1 TABLET ORAL DAILY
Refills: 0 | Status: DISCONTINUED | OUTPATIENT
Start: 2022-07-24 | End: 2022-07-24

## 2022-07-24 RX ORDER — THIAMINE MONONITRATE (VIT B1) 100 MG
100 TABLET ORAL ONCE
Refills: 0 | Status: DISCONTINUED | OUTPATIENT
Start: 2022-07-24 | End: 2022-07-24

## 2022-07-24 RX ORDER — ONDANSETRON 8 MG/1
4 TABLET, FILM COATED ORAL EVERY 6 HOURS
Refills: 0 | Status: DISCONTINUED | OUTPATIENT
Start: 2022-07-24 | End: 2022-07-24

## 2022-07-24 RX ORDER — THIAMINE MONONITRATE (VIT B1) 100 MG
100 TABLET ORAL ONCE
Refills: 0 | Status: DISCONTINUED | OUTPATIENT
Start: 2022-07-24 | End: 2022-07-25

## 2022-07-24 RX ORDER — ONDANSETRON 8 MG/1
8 TABLET, FILM COATED ORAL
Refills: 0 | Status: DISCONTINUED | OUTPATIENT
Start: 2022-07-24 | End: 2022-07-24

## 2022-07-24 RX ORDER — DIAZEPAM 5 MG
10 TABLET ORAL EVERY 4 HOURS
Refills: 0 | Status: DISCONTINUED | OUTPATIENT
Start: 2022-07-24 | End: 2022-07-25

## 2022-07-24 RX ORDER — DIAZEPAM 5 MG
10 TABLET ORAL ONCE
Refills: 0 | Status: DISCONTINUED | OUTPATIENT
Start: 2022-07-24 | End: 2022-07-24

## 2022-07-24 RX ORDER — THIAMINE MONONITRATE (VIT B1) 100 MG
200 TABLET ORAL ONCE
Refills: 0 | Status: DISCONTINUED | OUTPATIENT
Start: 2022-07-24 | End: 2022-07-24

## 2022-07-24 RX ORDER — DIAZEPAM 5 MG
5 TABLET ORAL ONCE
Refills: 0 | Status: DISCONTINUED | OUTPATIENT
Start: 2022-07-24 | End: 2022-07-24

## 2022-07-24 RX ORDER — GABAPENTIN 400 MG/1
300 CAPSULE ORAL THREE TIMES A DAY
Refills: 0 | Status: DISCONTINUED | OUTPATIENT
Start: 2022-07-24 | End: 2022-07-24

## 2022-07-24 RX ORDER — POTASSIUM CHLORIDE 20 MEQ
40 PACKET (EA) ORAL ONCE
Refills: 0 | Status: COMPLETED | OUTPATIENT
Start: 2022-07-24 | End: 2022-07-24

## 2022-07-24 RX ORDER — THIAMINE MONONITRATE (VIT B1) 100 MG
200 TABLET ORAL ONCE
Refills: 0 | Status: COMPLETED | OUTPATIENT
Start: 2022-07-24 | End: 2022-07-24

## 2022-07-24 RX ORDER — DIAZEPAM 5 MG
10 TABLET ORAL EVERY 4 HOURS
Refills: 0 | Status: DISCONTINUED | OUTPATIENT
Start: 2022-07-24 | End: 2022-07-24

## 2022-07-24 RX ORDER — SODIUM CHLORIDE 9 MG/ML
1000 INJECTION INTRAMUSCULAR; INTRAVENOUS; SUBCUTANEOUS
Refills: 0 | Status: COMPLETED | OUTPATIENT
Start: 2022-07-24 | End: 2022-07-24

## 2022-07-24 RX ORDER — FOLIC ACID 0.8 MG
1 TABLET ORAL DAILY
Refills: 0 | Status: DISCONTINUED | OUTPATIENT
Start: 2022-07-24 | End: 2022-07-29

## 2022-07-24 RX ADMIN — Medication 102 MILLIGRAM(S): at 04:52

## 2022-07-24 RX ADMIN — Medication 650 MILLIGRAM(S): at 02:06

## 2022-07-24 RX ADMIN — ONDANSETRON 8 MILLIGRAM(S): 8 TABLET, FILM COATED ORAL at 10:28

## 2022-07-24 RX ADMIN — Medication 2 MILLIGRAM(S): at 10:28

## 2022-07-24 RX ADMIN — Medication 5 MILLIGRAM(S): at 23:47

## 2022-07-24 RX ADMIN — SODIUM CHLORIDE 75 MILLILITER(S): 9 INJECTION INTRAMUSCULAR; INTRAVENOUS; SUBCUTANEOUS at 04:55

## 2022-07-24 RX ADMIN — Medication 650 MILLIGRAM(S): at 01:02

## 2022-07-24 RX ADMIN — Medication 2 MILLIGRAM(S): at 22:01

## 2022-07-24 RX ADMIN — Medication 1 TABLET(S): at 10:27

## 2022-07-24 RX ADMIN — Medication 2 MILLIGRAM(S): at 04:32

## 2022-07-24 RX ADMIN — Medication 100 MILLIGRAM(S): at 10:38

## 2022-07-24 RX ADMIN — Medication 2 MILLIGRAM(S): at 20:54

## 2022-07-24 RX ADMIN — Medication 4 MILLIGRAM(S): at 23:46

## 2022-07-24 RX ADMIN — GABAPENTIN 300 MILLIGRAM(S): 400 CAPSULE ORAL at 15:38

## 2022-07-24 RX ADMIN — Medication 1 MILLIGRAM(S): at 10:27

## 2022-07-24 RX ADMIN — Medication 2 MILLIGRAM(S): at 19:34

## 2022-07-24 RX ADMIN — Medication 2 MILLIGRAM(S): at 18:28

## 2022-07-24 RX ADMIN — Medication 2 MILLIGRAM(S): at 15:38

## 2022-07-24 RX ADMIN — Medication 40 MILLIEQUIVALENT(S): at 10:27

## 2022-07-24 NOTE — CHART NOTE - NSCHARTNOTEFT_GEN_A_CORE
Patient is a 32y old  Female who presents with a chief complaint of s/p fall out of bed today (2022 14:55)    BRIEF HOSPITAL COURSE: ]  32y F PMHx EtOH abuse admitted w/ EtOh withdrawal.     Events last 24 hours:   Admitted earlier this morning for EtOH withdrawal, placed on low-risk CIWA protocol Ativan taper. Tonight has been increasingly agitated requiring multiple code greys to be called and placed on 1:1 constant observation. Per bedside team, CIWA score >18 requiring Ativan 2mg IVPx3 to be given this evening.   Upgraded to CICU for closer monitoring.  Pt seen and examined at bedside - AAOx3, however states she believes she is being held captive by a cult. +b/l UE tremors.     PAST MEDICAL & SURGICAL HISTORY:  No pertinent past medical history  No significant past surgical history    FAMILY HISTORY:  No pertinent family history in first degree relatives. No pertinent family history of: stroke.    SOCIAL HISTORY:  Lives at home  (+)Alcohol abuse (drinks 5-6 drinks 4+ days/week  Non-smoker    Review of Systems:  CONSTITUTIONAL: No fever, chills, or fatigue  EYES: No eye pain, visual disturbances, or discharge  ENMT:  No difficulty hearing, tinnitus, vertigo; No sinus or throat pain  NECK: No pain or stiffness  RESPIRATORY: No cough, wheezing, chills or hemoptysis; No shortness of breath  CARDIOVASCULAR: No chest pain, palpitations, dizziness, or leg swelling  GASTROINTESTINAL: No abdominal or epigastric pain. No nausea, vomiting, or hematemesis; No diarrhea or constipation. No melena or hematochezia.  GENITOURINARY: No dysuria, frequency, hematuria, or incontinence  NEUROLOGICAL: No headaches, memory loss, loss of strength, numbness, or tremors  SKIN: No itching, burning, rashes, or lesions   MUSCULOSKELETAL: No joint pain or swelling; No muscle, back, or extremity pain  PSYCHIATRIC: No depression, anxiety, mood swings, or difficulty sleeping    Medications:  acetaminophen     Tablet .. 650 milliGRAM(s) Oral every 6 hours PRN  diazepam  Injectable 10 milliGRAM(s) IV Push every 4 hours PRN  LORazepam   Injectable 4 milliGRAM(s) IV Push every 4 hours  LORazepam   Injectable 2 milliGRAM(s) IV Push every 2 hours PRN  LORazepam   Injectable   IV Push   folic acid 1 milliGRAM(s) Oral daily  multivitamin 1 Tablet(s) Oral daily  thiamine Injectable 100 milliGRAM(s) IntraMuscular once    ICU Vital Signs Last 24 Hrs  T(C): 36.8 (2022 20:33), Max: 37.2 (2022 00:50)  T(F): 98.2 (:33), Max: 99 (2022 00:50)  HR: 138 (:33) (92 - 138)  BP: 150/98 (2022 20:33) (120/73 - 150/98)  BP(mean): --  ABP: --  ABP(mean): --  RR: 18 (:33) (17 - 18)  SpO2: 100% (:) (97% - 100%)    O2 Parameters below as of :33  Patient On (Oxygen Delivery Method): room air    I&O's Detail    LABS:                     12.1   3.48  )-----------( 131      ( 2022 16:56 )             34.2     07-24  137  |  104  |  5<L>  ----------------------------<  82  3.4<L>   |  23  |  0.46<L>  Ca    8.5      2022 04:17  Mg     1.9       TPro  6.7  /  Alb  x   /  TBili  x   /  DBili  x   /  AST  x   /  ALT  x   /  AlkPhos  x       CARDIAC MARKERS ( 2022 11:54 )  x     / x     / 102 U/L / x     / x        CAPILLARY BLOOD GLUCOSE    Urinalysis Basic - ( 2022 18:05 )  Color: Yellow / Appearance: Clear / S.010 / pH: x  Gluc: x / Ketone: Moderate  / Bili: Small / Urobili: 4   Blood: x / Protein: 30 mg/dL / Nitrite: Negative   Leuk Esterase: Trace / RBC: 0-2 /HPF / WBC 3-5   Sq Epi: x / Non Sq Epi: Moderate / Bacteria: Moderate    CULTURES:    Physical Examination:  General: Alert, oriented, interactive, nonfocal  HEENT: PERRL.  PULM: Clear to auscultation bilaterally.  CVS: s1/s2.  ABD: Soft, nondistended, nontender, normoactive bowel sounds.  EXT: No edema, nontender.  SKIN: Warm.  NEURO: +b/l UE tremors.    RADIOLOGY:   < from: MR Cervical Spine No Cont Disc (22 @ 15:33) >  ACC: [id number] EXAM:  MR SPINE CERVICAL DC                        ACC: [id number] EXAM:  MR BRAIN                        PROCEDURE DATE:  2022    IMPRESSION:  MR brain:   Unremarkable MR of the brain.  MR cervical spine: Limited examination. Minimal disc degeneration and   spondylosis at C4-5 and C5-6.      32y F PMHx EtOH abuse admitted w/ EtOh withdrawal.   Assessment:  1. EtOH withdrawal  2. DTs    Plan:  Upgrade to CICU for closer monitoring.  CIWA protocol Ativan taper increased to moderate risk taper. Valium 10mg IVP q4h PRN for CIWA >8 added.   Thiamine, MV, Folic Acid.     TIME SPENT: 37 minutes  Evaluating/treating patient, reviewing data/labs/imaging, discussing case with multidisciplinary team, discussing plan/goals of care with patient/family. Non-inclusive of procedure time.

## 2022-07-24 NOTE — CHART NOTE - NSCHARTNOTEFT_GEN_A_CORE
33 yo F admitted for alcohol withdrawal.   -Called by nursing to bedside due to pt's increased agitation. When seen at bedside, pt believes we are holding her captive in a cult, A&O x 2 (knows where she is and her name).  -Security called to bedside. Switched to High dose Ativan 2 mg taper + high dose PRN.   -2 mg IV administered STAT.   -Constant observation  -Scored CIWA 18 at bedside.  -ICU PA aware, pt may need to be moved off floors if sx do not resolve  -Continue to monitor 33 yo F admitted for alcohol withdrawal.   -Called by nursing to bedside due to pt's increased agitation. When seen at bedside, pt believes we are holding her captive in a cult, A&O x 2 (knows where she is and her name).  -Security called to bedside. Switched to High dose Ativan 2 mg taper + high dose PRN.   -2 mg IV administered STAT.   -Constant observation  -Scored CIWA 18 at bedside.  -ICU PA aware, pt may need to be moved off floors if sx do not resolve  -Continue to monitor    22:30 PM  -Pt continues to increase in CIWA score. Pt will be moved to CICU for higher Ativan dosing  -Switch Ativan taper to 4 mg taper.   -Administer Valium 5 mg IV STAT x 1  - updated

## 2022-07-24 NOTE — H&P ADULT - NSHPPHYSICALEXAM_GEN_ALL_CORE
Vital Signs Last 24 Hrs  T(C): 36.7 (24 Jul 2022 02:55), Max: 37.2 (24 Jul 2022 00:50)  T(F): 98 (24 Jul 2022 02:55), Max: 99 (24 Jul 2022 00:50)  HR: 93 (24 Jul 2022 02:55) (93 - 111)  BP: 138/80 (24 Jul 2022 02:55) (120/73 - 143/96)  BP(mean): --  RR: 18 (24 Jul 2022 02:55) (17 - 22)  SpO2: 98% (24 Jul 2022 02:55) (98% - 100%)    Parameters below as of 24 Jul 2022 02:55  Patient On (Oxygen Delivery Method): room air

## 2022-07-24 NOTE — H&P ADULT - NEUROLOGICAL
details… bilateral upper extremity tremors and tongue fasciculations/sensation intact/superficial reflexes intact

## 2022-07-24 NOTE — PROGRESS NOTE ADULT - SUBJECTIVE AND OBJECTIVE BOX
CC: Fall  History of Present Illness:   31 y/o F with no known PMHx was BIBA from home after reportedly falling out of bed today. Of note the patient notes that since receiving her COVID-19 vaccination on 5/1/2021 she has had increased symptoms of bilateral leg pain, and paresthesias leading to increased difficulty with ambulation. Additionally the patient states her symptoms have worsened over the past few weeks and reports at times "her legs were not moving." Upon the patient's fall out of bed the patient denied any associated head trauma, or prodrome of nausea, vomiting, or subjective fevers. Of note the patient admits to drinking multiple glasses of wine daily for months as well, was last drink last night. The patient states that she is being followed by her PCP for her tremors and lower extremity symptoms with no remarkable work up thus far.   Vital signs in triage => /59, /min, RR 22/min, Tmax 98.5'F SpO2 100% on room air. Labs => MCV/.9/37.5, , Na 133, Glu 126, TBili 2.1, AST//127, Mg 1.0, TSH 4.71, UA (+) with Ketones. CT Head => No acute intracranial hemorrhage, hydrocephalus or extra-axial fluid collection. No CT evidence for acute transcortical infarction. Bilateral Lower Extremity Venous Duplex => No evidence of deep venous thrombosis in either lower extremity. In the ED the patient received Ceftriaxone 1g IVP x 1, Ketorolac 15mg IVP x 1, Lorazepam 2mg IVP x 2, Magnesium sulfate 2g IVPB x 1, and NS x 2L.    REVIEW OF SYSTEMS: All other review of systems is negative unless indicated above.  Vital Signs Last 24 Hrs  T(C): 36.8 (24 Jul 2022 07:52), Max: 37.2 (24 Jul 2022 00:50)  T(F): 98.3 (24 Jul 2022 07:52), Max: 99 (24 Jul 2022 00:50)  HR: 102 (24 Jul 2022 07:52) (92 - 111)  BP: 141/93 (24 Jul 2022 07:52) (120/73 - 143/96)  BP(mean): --  RR: 18 (24 Jul 2022 07:52) (17 - 22)  SpO2: 100% (24 Jul 2022 07:52) (97% - 100%)    Parameters below as of 24 Jul 2022 07:52  Patient On (Oxygen Delivery Method): room air      Physical Exam:  · Constitutional	no distress  · Eyes	PERRL; EOMI  · Respiratory	clear to auscultation bilaterally; no wheezes; no rales  · Cardiovascular	regular rate and rhythm; S1 S2 present  · Gastrointestinal	soft; nontender; nondistended  · Neurological	sensation intact; superficial reflexes intact; bilateral upper extremity tremors and tongue fasciculations  · Mental Status	AAOx3  , b/l leg weakness, minimal strength against gravity b/l legs, sensation intact, patellar reflex intact b/l  · Motor	Bilateral lower leg weakness, minimal strength against gravity able to bear weight, able to ambulate via assistive device  · Skin	warm and dry; color normal  · Musculoskeletal	no joint erythema; no joint warmth; decreased strength; abnormal gait  · Psychiatric	anxious    MEDICATIONS  (STANDING):  folic acid 1 milliGRAM(s) Oral daily  gabapentin 300 milliGRAM(s) Oral three times a day  LORazepam     Tablet 2 milliGRAM(s) Oral every 4 hours  LORazepam     Tablet   Oral   multivitamin 1 Tablet(s) Oral daily  ondansetron Injectable 8 milliGRAM(s) IV Push two times a day  thiamine Injectable 100 milliGRAM(s) IV Push daily    MEDICATIONS  (PRN):  LORazepam     Tablet 2 milliGRAM(s) Oral every 2 hours PRN Symptom-triggered 2 point increase in CIWA-Ar  LORazepam     Tablet 2 milliGRAM(s) Oral every 1 hour PRN Symptom-triggered: each CIWA -Ar score 8 or GREATER      CBC:            12.1   3.48  )-----------( 131      ( 07-23-22 @ 16:56 )             34.2         Chem:         ( 07-24-22 @ 04:17 )    137  |  104  |  5<L>  ----------------------------<  82  3.4<L>   |  23  |  0.46<L>        Liver Functions: ( 07-24-22 @ 11:54 )  Alb: x     / Pro: 6.7 g/dL / ALK PHOS: x     / ALT: x     / AST: x     / GGT: x                Assessment/Pl32 y/o F with no known PMHx was BIBA from home after reportedly falling out of bed today. Of note the patient notes that since receiving her COVID-19 vaccination on 5/1/2021 she has had increased symptoms of bilateral leg pain, and paresthesias leading to increased difficulty with ambulation.      #Alcohol withdrawal syndrome  -monitor on Telemetry  ~cont. CIWA per protocol  ~cont. Thiamine replacement  ~cont. Folic acid 1mg po daily  ~cont. MVI 1tab po daily  ~fall precautions    #Bilateral Leg Weakness: DDx include Vitamin Deficiencies (Thiamine; early Wernicke's? patient noted to be severely hypomagnesemic), vs GBS (although not acute and not ascending in nature) vs Toxins (Alcohol abuse; lower alcoholic myopathy with selected atrophy of Type II muscle fibers) vs demyelinating disease  ~f/u w/ Neurology appreciated, MRIs pending   ~fall precautions  ~bed alarm  ~cont. Vitamin supplementation  - Severe Hypomagnesemia: Repleted and resolved    #Elevated Transaminases: improving  #Thrombocytopenia:  ~likely due to this patient's hx of ongoing EtOH abuse  ~cont. to trend    #Vte ppx  -ambulation

## 2022-07-24 NOTE — H&P ADULT - MOTOR
Bilateral lower leg weakness, minimal strength against gravity able to bear weight, able to ambulate via assistive device

## 2022-07-24 NOTE — H&P ADULT - MENTAL STATUS
AAOx3  , b/l leg weakness, minimal strength against gravity b/l legs, sensation intact, patellar reflex intact b/l

## 2022-07-24 NOTE — PROVIDER CONTACT NOTE (CHANGE IN STATUS NOTIFICATION) - ACTION/TREATMENT ORDERED:
MD Sabina Wagner aware, assessing pt at bedside. STAT ativan ordered. 1:1 ordered for safety. Will assess need for critical care consult. Will continue to monitor.

## 2022-07-24 NOTE — H&P ADULT - HISTORY OF PRESENT ILLNESS
31 y/o F with no known PMHx was BIBA from home after reportedly falling out of bed today. Of note the patient notes that since receiving her COVID-19 vaccination on 5/1/2021 she has had increased symptoms of bilateral leg pain, and paresthesias leading to increased difficulty with ambulation. Additionally the patient states her symptoms have worsened over the past few weeks and reports at times "her legs were not moving." Upon the patient's fall out of bed the patient denied any associated head trauma, or prodrome of nausea, vomiting, or subjective fevers. Of note the patient admits to drinking multiple glasses of wine daily for months as well, was last drink last night. The patient states that she is being followed by her PCP for her tremors and lower extremity symptoms with no remarkable work up thus far.   Vital signs in triage => /59, /min, RR 22/min, Tmax 98.5'F SpO2 100% on room air. Labs => MCV/.9/37.5, , Na 133, Glu 126, TBili 2.1, AST//127, Mg 1.0, TSH 4.71, UA (+) with Ketones. CT Head => No acute intracranial hemorrhage, hydrocephalus or extra-axial fluid collection. No CT evidence for acute transcortical infarction. Bilateral Lower Extremity Venous Duplex => No evidence of deep venous thrombosis in either lower extremity. In the ED the patient received Ceftriaxone 1g IVP x 1, Ketorolac 15mg IVP x 1, Lorazepam 2mg IVP x 2, Magnesium sulfate 2g IVPB x 1, and NS x 2L.

## 2022-07-24 NOTE — H&P ADULT - ASSESSMENT
33 y/o F with no known PMHx was BIBA from home after reportedly falling out of bed today. Of note the patient notes that since receiving her COVID-19 vaccination on 5/1/2021 she has had increased symptoms of bilateral leg pain, and paresthesias leading to increased difficulty with ambulation. Additionally the patient states her symptoms have worsened over the past few weeks and reports at times "her legs were not moving." Upon the patient's fall out of bed the patient denied any associated head trauma, or prodrome of nausea, vomiting, or subjective fevers. Of note the patient admits to drinking multiple glasses of wine daily for months as well, was last drink last night. The patient states that she is being followed by her PCP for her tremors and lower extremity symptoms with no remarkable work up thus far.   Vital signs in triage => /59, /min, RR 22/min, Tmax 98.5'F SpO2 100% on room air. Labs => MCV/.9/37.5, , Na 133, Glu 126, TBili 2.1, AST//127, Mg 1.0, TSH 4.71, UA (+) with Ketones. CT Head => No acute intracranial hemorrhage, hydrocephalus or extra-axial fluid collection. No CT evidence for acute transcortical infarction. Bilateral Lower Extremity Venous Duplex => No evidence of deep venous thrombosis in either lower extremity. In the ED the patient received Ceftriaxone 1g IVP x 1, Ketorolac 15mg IVP x 1, Lorazepam 2mg IVP x 2, Magnesium sulfate 2g IVPB x 1, and NS x 2L. 33 y/o F with no known PMHx was BIBA from home after reportedly falling out of bed today. Of note the patient notes that since receiving her COVID-19 vaccination on 5/1/2021 she has had increased symptoms of bilateral leg pain, and paresthesias leading to increased difficulty with ambulation. Additionally the patient states her symptoms have worsened over the past few weeks and reports at times "her legs were not moving." Upon the patient's fall out of bed the patient denied any associated head trauma, or prodrome of nausea, vomiting, or subjective fevers. Of note the patient admits to drinking multiple glasses of wine daily for months as well, was last drink last night. The patient states that she is being followed by her PCP for her tremors and lower extremity symptoms with no remarkable work up thus far.   Vital signs in triage => /59, /min, RR 22/min, Tmax 98.5'F SpO2 100% on room air. Labs => MCV/.9/37.5, , Na 133, Glu 126, TBili 2.1, AST//127, Mg 1.0, TSH 4.71, UA (+) with Ketones. CT Head => No acute intracranial hemorrhage, hydrocephalus or extra-axial fluid collection. No CT evidence for acute transcortical infarction. Bilateral Lower Extremity Venous Duplex => No evidence of deep venous thrombosis in either lower extremity. In the ED the patient received Ceftriaxone 1g IVP x 1, Ketorolac 15mg IVP x 1, Lorazepam 2mg IVP x 2, Magnesium sulfate 2g IVPB x 1, and NS x 2L.    #Alcohol withdrawal syndrome  ~admit to Telemetry  ~cont. CIWA per protocol  ~cont. Thiamine replacement  ~cont. Folic acid 1mg po daily  ~cont. MVI 1tab po daily  ~fall precautions    #Bilateral Leg Weakness  ~f/u w/ Neurology  ~DDx include Vitamin Deficiencies (Thiamine; early Wernicke's? patient noted to be severely hypomagnesemic), vs GBS (although not acute and not ascending in nature) vs Toxins (Alcohol abuse; lower alcoholic myopathy with selected atrophy of Type II muscle fibers)  ~fall precautions  ~bed alarm  ~cont. Vitamin supplementation    #Severe Hypomagnesemia  ~cont. replete  ~f/u am labs     #Elevated Transaminases  #Thrombocytopenia  ~likely due to this patient's hx of ongoing EtOH abuse  ~cont. to trend    #Vte ppx  IMPROVE VTE Risk Score is 0  [  ] Previous VTE                                                  3  [  ] Thrombophilia                                               2  [  ] Lower limb paralysis                                      2        (unable to hold up >15 seconds)    [  ] Current Cancer                                              2         (within 6 months)  [  ] Immobilization > 24 hrs                                1  [  ] ICU/CCU stay > 24 hours                              1  [  ] Age > 60                                                      1

## 2022-07-25 LAB
ALBUMIN SERPL ELPH-MCNC: 3.7 G/DL — SIGNIFICANT CHANGE UP (ref 3.3–5)
ALP SERPL-CCNC: 41 U/L — SIGNIFICANT CHANGE UP (ref 40–120)
ALT FLD-CCNC: 76 U/L — SIGNIFICANT CHANGE UP (ref 12–78)
ANION GAP SERPL CALC-SCNC: 10 MMOL/L — SIGNIFICANT CHANGE UP (ref 5–17)
APTT BLD: 26.5 SEC — LOW (ref 27.5–35.5)
AST SERPL-CCNC: 128 U/L — HIGH (ref 15–37)
B BURGDOR C6 AB SER-ACNC: NEGATIVE — SIGNIFICANT CHANGE UP
B BURGDOR IGG+IGM SER-ACNC: 0.05 INDEX — SIGNIFICANT CHANGE UP (ref 0.01–0.89)
BILIRUB SERPL-MCNC: 1.4 MG/DL — HIGH (ref 0.2–1.2)
BUN SERPL-MCNC: 2 MG/DL — LOW (ref 7–23)
CALCIUM SERPL-MCNC: 9.2 MG/DL — SIGNIFICANT CHANGE UP (ref 8.5–10.1)
CHLORIDE SERPL-SCNC: 104 MMOL/L — SIGNIFICANT CHANGE UP (ref 96–108)
CO2 SERPL-SCNC: 23 MMOL/L — SIGNIFICANT CHANGE UP (ref 22–31)
CREAT SERPL-MCNC: 0.51 MG/DL — SIGNIFICANT CHANGE UP (ref 0.5–1.3)
EGFR: 127 ML/MIN/1.73M2 — SIGNIFICANT CHANGE UP
GLUCOSE SERPL-MCNC: 94 MG/DL — SIGNIFICANT CHANGE UP (ref 70–99)
HCT VFR BLD CALC: 32 % — LOW (ref 34.5–45)
HGB BLD-MCNC: 11.1 G/DL — LOW (ref 11.5–15.5)
INR BLD: 1.01 RATIO — SIGNIFICANT CHANGE UP (ref 0.88–1.16)
MAGNESIUM SERPL-MCNC: 1.6 MG/DL — SIGNIFICANT CHANGE UP (ref 1.6–2.6)
MCHC RBC-ENTMCNC: 34.7 GM/DL — SIGNIFICANT CHANGE UP (ref 32–36)
MCHC RBC-ENTMCNC: 38.3 PG — HIGH (ref 27–34)
MCV RBC AUTO: 110.3 FL — HIGH (ref 80–100)
PHOSPHATE SERPL-MCNC: 2.7 MG/DL — SIGNIFICANT CHANGE UP (ref 2.5–4.5)
PLATELET # BLD AUTO: 104 K/UL — LOW (ref 150–400)
POTASSIUM SERPL-MCNC: 3.4 MMOL/L — LOW (ref 3.5–5.3)
POTASSIUM SERPL-SCNC: 3.4 MMOL/L — LOW (ref 3.5–5.3)
PROT SERPL-MCNC: 7.3 GM/DL — SIGNIFICANT CHANGE UP (ref 6–8.3)
PROTHROM AB SERPL-ACNC: 11.7 SEC — SIGNIFICANT CHANGE UP (ref 10.5–13.4)
RBC # BLD: 2.9 M/UL — LOW (ref 3.8–5.2)
RBC # FLD: 12 % — SIGNIFICANT CHANGE UP (ref 10.3–14.5)
SODIUM SERPL-SCNC: 137 MMOL/L — SIGNIFICANT CHANGE UP (ref 135–145)
T PALLIDUM AB TITR SER: NEGATIVE — SIGNIFICANT CHANGE UP
T4 FREE SERPL-MCNC: 0.96 NG/DL — SIGNIFICANT CHANGE UP (ref 0.76–1.46)
TSH SERPL-MCNC: 3.93 UU/ML — SIGNIFICANT CHANGE UP (ref 0.34–4.82)
WBC # BLD: 4.43 K/UL — SIGNIFICANT CHANGE UP (ref 3.8–10.5)
WBC # FLD AUTO: 4.43 K/UL — SIGNIFICANT CHANGE UP (ref 3.8–10.5)

## 2022-07-25 RX ORDER — SODIUM CHLORIDE 9 MG/ML
1000 INJECTION, SOLUTION INTRAVENOUS
Refills: 0 | Status: DISCONTINUED | OUTPATIENT
Start: 2022-07-25 | End: 2022-07-27

## 2022-07-25 RX ORDER — MAGNESIUM SULFATE 500 MG/ML
2 VIAL (ML) INJECTION ONCE
Refills: 0 | Status: COMPLETED | OUTPATIENT
Start: 2022-07-25 | End: 2022-07-25

## 2022-07-25 RX ORDER — THIAMINE MONONITRATE (VIT B1) 100 MG
100 TABLET ORAL DAILY
Refills: 0 | Status: DISCONTINUED | OUTPATIENT
Start: 2022-07-25 | End: 2022-07-25

## 2022-07-25 RX ORDER — THIAMINE MONONITRATE (VIT B1) 100 MG
500 TABLET ORAL DAILY
Refills: 0 | Status: DISCONTINUED | OUTPATIENT
Start: 2022-07-25 | End: 2022-07-28

## 2022-07-25 RX ORDER — PANTOPRAZOLE SODIUM 20 MG/1
40 TABLET, DELAYED RELEASE ORAL
Refills: 0 | Status: DISCONTINUED | OUTPATIENT
Start: 2022-07-25 | End: 2022-07-29

## 2022-07-25 RX ORDER — DEXMEDETOMIDINE HYDROCHLORIDE IN 0.9% SODIUM CHLORIDE 4 UG/ML
0.5 INJECTION INTRAVENOUS
Qty: 400 | Refills: 0 | Status: DISCONTINUED | OUTPATIENT
Start: 2022-07-25 | End: 2022-07-26

## 2022-07-25 RX ORDER — ENOXAPARIN SODIUM 100 MG/ML
40 INJECTION SUBCUTANEOUS EVERY 24 HOURS
Refills: 0 | Status: DISCONTINUED | OUTPATIENT
Start: 2022-07-25 | End: 2022-07-29

## 2022-07-25 RX ORDER — DIAZEPAM 5 MG
5 TABLET ORAL EVERY 4 HOURS
Refills: 0 | Status: DISCONTINUED | OUTPATIENT
Start: 2022-07-25 | End: 2022-07-25

## 2022-07-25 RX ORDER — POTASSIUM CHLORIDE 20 MEQ
40 PACKET (EA) ORAL EVERY 4 HOURS
Refills: 0 | Status: COMPLETED | OUTPATIENT
Start: 2022-07-25 | End: 2022-07-25

## 2022-07-25 RX ORDER — METOPROLOL TARTRATE 50 MG
2.5 TABLET ORAL EVERY 6 HOURS
Refills: 0 | Status: DISCONTINUED | OUTPATIENT
Start: 2022-07-25 | End: 2022-07-25

## 2022-07-25 RX ORDER — SODIUM CHLORIDE 9 MG/ML
1000 INJECTION, SOLUTION INTRAVENOUS
Refills: 0 | Status: DISCONTINUED | OUTPATIENT
Start: 2022-07-25 | End: 2022-07-25

## 2022-07-25 RX ORDER — GABAPENTIN 400 MG/1
300 CAPSULE ORAL THREE TIMES A DAY
Refills: 0 | Status: DISCONTINUED | OUTPATIENT
Start: 2022-07-25 | End: 2022-07-29

## 2022-07-25 RX ADMIN — Medication 5 MILLIGRAM(S): at 02:16

## 2022-07-25 RX ADMIN — Medication 100 MILLIGRAM(S): at 14:57

## 2022-07-25 RX ADMIN — GABAPENTIN 300 MILLIGRAM(S): 400 CAPSULE ORAL at 14:39

## 2022-07-25 RX ADMIN — Medication 4 MILLIGRAM(S): at 05:01

## 2022-07-25 RX ADMIN — Medication 4 MILLIGRAM(S): at 14:07

## 2022-07-25 RX ADMIN — Medication 1 MILLIGRAM(S): at 10:27

## 2022-07-25 RX ADMIN — Medication 40 MILLIEQUIVALENT(S): at 14:39

## 2022-07-25 RX ADMIN — DEXMEDETOMIDINE HYDROCHLORIDE IN 0.9% SODIUM CHLORIDE 7.94 MICROGRAM(S)/KG/HR: 4 INJECTION INTRAVENOUS at 14:27

## 2022-07-25 RX ADMIN — SODIUM CHLORIDE 100 MILLILITER(S): 9 INJECTION, SOLUTION INTRAVENOUS at 14:26

## 2022-07-25 RX ADMIN — Medication 4 MILLIGRAM(S): at 10:27

## 2022-07-25 RX ADMIN — Medication 5 MILLIGRAM(S): at 06:34

## 2022-07-25 RX ADMIN — ENOXAPARIN SODIUM 40 MILLIGRAM(S): 100 INJECTION SUBCUTANEOUS at 14:58

## 2022-07-25 RX ADMIN — Medication 2 MILLIGRAM(S): at 11:56

## 2022-07-25 RX ADMIN — Medication 2 MILLIGRAM(S): at 02:08

## 2022-07-25 RX ADMIN — Medication 4 MILLIGRAM(S): at 18:11

## 2022-07-25 RX ADMIN — Medication 40 MILLIEQUIVALENT(S): at 18:10

## 2022-07-25 RX ADMIN — GABAPENTIN 300 MILLIGRAM(S): 400 CAPSULE ORAL at 22:10

## 2022-07-25 RX ADMIN — Medication 255 MILLIGRAM(S): at 20:23

## 2022-07-25 RX ADMIN — Medication 2 MILLIGRAM(S): at 08:29

## 2022-07-25 RX ADMIN — Medication 2.5 MILLIGRAM(S): at 08:57

## 2022-07-25 RX ADMIN — Medication 4 MILLIGRAM(S): at 01:12

## 2022-07-25 RX ADMIN — Medication 1 TABLET(S): at 10:28

## 2022-07-25 RX ADMIN — Medication 25 GRAM(S): at 14:52

## 2022-07-25 NOTE — PROVIDER CONTACT NOTE (CHANGE IN STATUS NOTIFICATION) - SITUATION
CIWA 19. Patient confused, hallucinating, screaming in room. Requiring PRN Ativan along with standing Ativan and Valium. Patient unable to be re oriented. 1:1 in place
Pt agitated and trying to leaving the room with unsteady gait. Pt not making sense.

## 2022-07-25 NOTE — PROGRESS NOTE ADULT - ASSESSMENT
31 y/o woman BIBA from home after falling out of bed, c/o  bilateral leg pain, weakness, paresthesias. On exam has hyperreflexia, increased lower extremity tone, proximal lower extremity weakness. Not c/w a peripheral neuropathy. Would r/o demyelinating disorder. Normal MR brain, incomplete mr cervical spine, thoracic not done. Now transferred to CCU, management of alcohol withdrawal.   Suggest:  Once medically stable MR cervical and thoracic spine w/wo  Tx of DT as per medicine

## 2022-07-25 NOTE — PROVIDER CONTACT NOTE (CHANGE IN STATUS NOTIFICATION) - BACKGROUND
Pt admitted with alcohol withdrawal, hypomagnesemia, BL LE tingling and pain.
Admitted for alcohol withdrawal. Last drink 7/22

## 2022-07-25 NOTE — CONSULT NOTE ADULT - ASSESSMENT
31 y/o woman with no known PMHx was BIBA from home after reportedly falling out of bed today. c/o  bilateral leg pain, and paresthesias leading to increased difficulty with ambulation. On exam has hyperreflexia, increased lower extremity tone, proximal lower extremity weakness. Not c/w a peripheral neuropathy. Would r/o demyelinating disorder. Normal esr, c-reactive protein.  Suggest:  lorenzo, rf anti -DS ABS, Sjogren, SPEP, Lymes abs, RPR. CPK  MR of brain cervical and thoracic spine w/wo  Correct electrolyte s asp er medicine.  watch for DTs.  Gabapentin 300 mg TID  PT evaluation  
A:    32F  HD # 3  FULL CODE    Here for:    1. Alcohol withdrawal syndrome/DT's, refractory to BZD therapy  2. Transaminitis  3. Hypokalemia  4. Hypomagnesemia  5. Neuropathy, unclear etiology  6. Alcoholic/starvation ketosis    This patient requires critical care for support of one or more vital organ systems with a high probability of imminent or life threatening deterioration in his/her condition    P:    Pt BIBA with c/o of weakness in extremities  Being w/u for neuropathy, Neurology on board; MRI B, cspine OK (albeit w/o catherine), esr/crp OK, pending rheum labs    Pt now in severe DT's despite aggressive BZD therapy with ativan    d/c all current BZD orders. Start ativan 4mg IV q4h ATC + 4mg q1h for breakthrough. Start precedex as adjunctive therapy. + FHX (mother) MS. Pt carries Dx of fibromyalgia; restart gabapentin 300mg PO TID. MRI B, cspine without acute pathology. Remainder of neuropathy w/u per Neuro.   HD monitoring. Sinus tachycardia 2/2 DT's.  IS, OOB as able.   Maintain 1:1 for patient safety.   Continue diet as tolerated.  + mod ketones on UA on admission; ? etoh +/- starvation ketosis. Start maintenance fluid, add dextrose to bag.  Thiamine, folate.   UA on admit + LE, + bacturia; no fevers or white count, will send Cx prior to Tx. Send full tick panel given neurologic issues, tick season, does not endorse.  Start D5LR @ 100cc/hr x 24h.  Replete KCL oral, Mg++ IV, repeat in AM.  f/u labs.   Hgb 11, Plt 104, no s/s active bleeding.  VTE ppx PUD ppx.    Dispo: Accept to critical care. ATC ativan + precedex as adjunct. Neuropathy w/u per Neurology. Send UCx. Hydrate. Replete lytes. Pending psych consult.     TOTAL CRITICAL CARE TIME: 45 minutes (EXCLUSIVE of any non bundled procedures)    Note: This time spent INCLUDES time spent directly as this patient's bedside with evaluation, review of chart including review of laboratory and imaging studies, interpretation of vital signs and cardiac output measurements, any necessary ventilator management, and time spent discussing plan of care with patient and family, including goals of care discussion.

## 2022-07-25 NOTE — PROVIDER CONTACT NOTE (CHANGE IN STATUS NOTIFICATION) - ASSESSMENT
Pt agitated, anxious, hallucinating, unsteady gait, pulse rate in the 200s.
 Sinus tachycardia, /98 (106)

## 2022-07-25 NOTE — PROGRESS NOTE ADULT - SUBJECTIVE AND OBJECTIVE BOX
HPI:  33 y/o F with no known PMHx was BIBA from home after reportedly falling out of bed c/o bilateral leg pain, and paresthesias, weakness, increasing difficulty with ambulation. Presently transfered to CCU for acute alcohol withdrawal.    MEDICATIONS  (STANDING):  dexMEDEtomidine Infusion 0.5 MICROgram(s)/kG/Hr (7.94 mL/Hr) IV Continuous <Continuous>  dextrose 5% + lactated ringers. 1000 milliLiter(s) (100 mL/Hr) IV Continuous <Continuous>  enoxaparin Injectable 40 milliGRAM(s) SubCutaneous every 24 hours  folic acid 1 milliGRAM(s) Oral daily  gabapentin 300 milliGRAM(s) Oral three times a day  LORazepam   Injectable 4 milliGRAM(s) IV Push every 4 hours  multivitamin 1 Tablet(s) Oral daily  pantoprazole    Tablet 40 milliGRAM(s) Oral before breakfast  potassium chloride    Tablet ER 40 milliEquivalent(s) Oral every 4 hours  thiamine Injectable 100 milliGRAM(s) IV Push daily    MEDICATIONS  (PRN):  acetaminophen     Tablet .. 650 milliGRAM(s) Oral every 6 hours PRN Mild Pain (1 - 3)  LORazepam   Injectable 4 milliGRAM(s) IV Push every 1 hour PRN Agitation      Vital Signs Last 24 Hrs  T(C): 36.1 (07-25-22 @ 05:00), Max: 36.9 (07-24-22 @ 18:18)  T(F): 97 (07-25-22 @ 05:00), Max: 98.4 (07-24-22 @ 18:18)  HR: 117 (07-25-22 @ 15:00) (96 - 153)  BP: 128/83 (07-25-22 @ 15:00) (111/78 - 154/100)  BP(mean): 94 (07-25-22 @ 15:00) (85 - 114)  RR: 22 (07-25-22 @ 15:00) (17 - 30)  SpO2: 100% (07-25-22 @ 14:15) (93% - 100%)      Neurological Exam:  MS: Alert, confused, knows she in HH, flat affect. conversant, follows commands   CN: PERLL, EOMI, V1-3 sensation intact, face symmetric, hearing intact, tongue midline, SCM equal bilaterally  Motor: normal bulk, diff to examine Lower extremities, c/o pain, increased tone,  proximal legs 4+/5, distal 5/5   Sens: Intact to light touch.    Reflexes: 2-3/4 all over, downgoing toes b/l  Coord:  No gross dysmetria,  Gait: Cannot test      Comprehensive Metabolic Panel in AM (07.25.22 @ 06:48)   Sodium, Serum: 137 mmol/L   Potassium, Serum: 3.4 mmol/L   Chloride, Serum: 104 mmol/L   Carbon Dioxide, Serum: 23 mmol/L   Anion Gap, Serum: 10 mmol/L   Blood Urea Nitrogen, Serum: 2 mg/dL   Creatinine, Serum: 0.51 mg/dL   Glucose, Serum: 94 mg/dL   Calcium, Total Serum: 9.2 mg/dL   Protein Total, Serum: 7.3 gm/dL   Albumin, Serum: 3.7 g/dL   Bilirubin Total, Serum: 1.4 mg/dL   Alkaline Phosphatase, Serum: 41 U/L   Aspartate Aminotransferase (AST/SGOT): 128 U/L   Alanine Aminotransferase (ALT/SGPT): 76 U/L   eGFR: 127:     Creatine Kinase, Serum: 102 U/L (07.24.22 @ 11:54)     Lyme C6 Interpretation: Negative:     Treponema Pallidum Antibody Interpretation: Negative:     Rheumatoid Factor Quant, Serum or Plasma: <10:      Folate, Serum: 2.3 ng/mL (07.24.22 @ 04:17)     Vitamin B12, Serum: 348 pg/mL (07.24.22 @ 04:17)       < from: MR Head No Cont (07.24.22 @ 15:31) >    IMPRESSION:    MR brain:   Unremarkable MR of the brain.      MR cervical spine: Limited examination. Minimal disc degeneration and   spondylosis at C4-5 and C5-6.    < end of copied text >

## 2022-07-25 NOTE — CONSULT NOTE ADULT - SUBJECTIVE AND OBJECTIVE BOX
Neurology Consult requested by:   Patient is a 32y old  Female who presents with a chief complaint of s/p fall out of bed today (24 Jul 2022 04:00)     HPI:  33 y/o F with no known PMHx was BIBA from home after reportedly falling out of bed today. Of note the patient notes that since receiving her COVID-19 vaccination on 5/1/2021 she has had increased symptoms of bilateral leg pain, and paresthesias leading to increased difficulty with ambulation. Additionally the patient states her symptoms have worsened over the past few weeks and reports at times "her legs were not moving." Upon the patient's fall out of bed the patient denied any associated head trauma, or prodrome of nausea, vomiting, or subjective fevers. Of note the patient admits to drinking multiple glasses of wine daily for months as well, was last drink last night. The patient states that she is being followed by her PCP for her tremors and lower extremity symptoms with no remarkable work up thus far.   C/o generalized pains, knees, ankles, legs feel tight. Also reports numbness, pins-needles in  the feet, hands. Evaluated by rheumatologist in the past told she may have fibromyalgia.     PAST MEDICAL & SURGICAL HISTORY:  No pertinent past medical history      No significant past surgical history        FAMILY HISTORY:  No pertinent family history in first degree relatives      Social Hx:  Nonsmoker, no drug or alcohol use  Medications and Allergies ReviewedMEDICATIONS  (STANDING):  folic acid 1 milliGRAM(s) Oral daily  gabapentin 300 milliGRAM(s) Oral three times a day  LORazepam     Tablet 2 milliGRAM(s) Oral every 4 hours  LORazepam     Tablet   Oral   multivitamin 1 Tablet(s) Oral daily  ondansetron Injectable 8 milliGRAM(s) IV Push two times a day  thiamine Injectable 100 milliGRAM(s) IV Push daily     ROS: Pertinent positives in HPI, all other ROS were reviewed and are negative.      Examination:   Vital Signs Last 24 Hrs  T(C): 36.8 (24 Jul 2022 07:52), Max: 37.2 (24 Jul 2022 00:50)  T(F): 98.3 (24 Jul 2022 07:52), Max: 99 (24 Jul 2022 00:50)  HR: 102 (24 Jul 2022 07:52) (92 - 111)  BP: 141/93 (24 Jul 2022 07:52) (120/73 - 143/96)  BP(mean): --  RR: 18 (24 Jul 2022 07:52) (17 - 22)  SpO2: 100% (24 Jul 2022 07:52) (97% - 100%)    Parameters below as of 24 Jul 2022 07:52  Patient On (Oxygen Delivery Method): room air      General: Cooperative, NAD   NECK: supple, no masses  ENT: Normal hearing   Vascular : no carotid bruits,   Lungs: CTAB  Chest: RRR, no murmurs  Extremities: nontender, no edema  Musculoskeletal: no adventitious movements, + bilat ankle, knees joint stiffness, painful to the touch.  Skin: no rash    Neurological Examination:  NIHSS:0  MS: AOx3. Appropriately interactive, normal affect. Speech fluent w/o paraphasic error, repetition, naming, reading is intact   CN: VFFTC, PERLL, EOMI, V1-3 sensation intact, face symmetric, hearing intact, palate elevates symmetrically, tongue midline, SCM equal bilaterally  Motor: normal bulk, increased ton in LEs, no tremor, 5/5 UEs,  proximal legs 4+/5, distal 5/5   Sens: Intact to light touch.    Reflexes: 2-3/4 all over, downgoing toes b/l  Coord:  No dysmetria,  Gait: Cannot test    Labs: Reviewed  Vitamin B12, Serum: 348 pg/mL (07.24.22 @ 04:17)   Folate, Serum: 2.3 ng/mL (07.24.22 @ 04:17)   C-Reactive Protein, Serum: <3: Test Repeated mg/L Sedimentation Rate,   Erythrocyte: 2 mm/hr (07.24.22 @ 04:17)     Comprehensive Metabolic Panel (07.24.22 @ 04:17)   Sodium, Serum: 137 mmol/L   Potassium, Serum: 3.4 mmol/L   Chloride, Serum: 104 mmol/L   Carbon Dioxide, Serum: 23 mmol/L   Anion Gap, Serum: 10 mmol/L   Blood Urea Nitrogen, Serum: 5 mg/dL   Creatinine, Serum: 0.46 mg/dL   Glucose, Serum: 82 mg/dL   Calcium, Total Serum: 8.5 mg/dL   Protein Total, Serum: 6.6 gm/dL   Albumin, Serum: 3.5 g/dL   Bilirubin Total, Serum: 1.7 mg/dL   Alkaline Phosphatase, Serum: 39 U/L   Aspartate Aminotransferase (AST/SGOT): 251 U/L   Alanine Aminotransferase (ALT/SGPT): 97 U/L   eGFR: 130:    Thyroid Stimulating Hormone, Serum: 1.62 uU/mL (05.19.21 @ 12:49)                 Imaging:   < from: CT Head No Cont (07.23.22 @ 17:46) >    IMPRESSION:    No acute intracranial hemorrhage, hydrocephalus or extra-axial fluid   collection.    No CT evidence for acute transcortical infarction. Please note that MR   imaging is a more sensitive imaging modality fordetection of acute   infarction and may be obtained as clinically warranted.    < end of copied text >    
ICU Progress Note    HPI:    S:    Pt seen and examined  HD # 3  FULL CODE  PMHx IBS  BIBA from home after reportedly falling out of bed today. Of note the patient notes that since receiving her COVID-19 vaccination on 2021 she has had increased symptoms of bilateral leg pain, and paresthesias leading to increased difficulty with ambulation. Additionally the patient states her symptoms have worsened over the past few weeks and reports at times "her legs were not moving." Upon the patient's fall out of bed the patient denied any associated head trauma, or prodrome of nausea, vomiting, or subjective fevers. Of note the patient admits to drinking multiple glasses of wine daily for months as well, was last drink last night. The patient states that she is being followed by her PCP for her tremors and lower extremity symptoms with no remarkable work up thus far.   Vital signs in triage => /59, /min, RR 22/min, Tmax 98.5'F SpO2 100% on room air. Labs => MCV/.9/37.5, , Na 133, Glu 126, TBili 2.1, AST//127, Mg 1.0, TSH 4.71, UA (+) with Ketones. CT Head => No acute intracranial hemorrhage, hydrocephalus or extra-axial fluid collection. No CT evidence for acute transcortical infarction. Bilateral Lower Extremity Venous Duplex => No evidence of deep venous thrombosis in either lower extremity. In the ED the patient received Ceftriaxone 1g IVP x 1, Ketorolac 15mg IVP x 1, Lorazepam 2mg IVP x 2, Magnesium sulfate 2g IVPB x 1, and NS x 2L    7/25 AM: Tachycardic, confused, tremors despite high dose BZD therapy prompting consult.     ROS: + weakness + tremors  Remainder of systems reviewed, negative    Allergies    No Known Allergies    Intolerances    MEDICATIONS  (STANDING):    dexMEDEtomidine Infusion 0.5 MICROgram(s)/kG/Hr (7.94 mL/Hr) IV Continuous <Continuous>  dextrose 5% + lactated ringers. 1000 milliLiter(s) (100 mL/Hr) IV Continuous <Continuous>  folic acid 1 milliGRAM(s) Oral daily  gabapentin 300 milliGRAM(s) Oral three times a day  LORazepam   Injectable 4 milliGRAM(s) IV Push every 4 hours  magnesium sulfate  IVPB 2 Gram(s) IV Intermittent once  multivitamin 1 Tablet(s) Oral daily  potassium chloride    Tablet ER 40 milliEquivalent(s) Oral every 4 hours  thiamine Injectable 100 milliGRAM(s) IV Push daily    MEDICATIONS  (PRN):    acetaminophen     Tablet .. 650 milliGRAM(s) Oral every 6 hours PRN Mild Pain (1 - 3)  LORazepam   Injectable 4 milliGRAM(s) IV Push every 1 hour PRN Agitation    Drug Dosing Weight    Height (cm): 160 (2022 15:44)  Weight (kg): 63.5 (2022 15:44)  BMI (kg/m2): 24.8 (2022 15:44)  BSA (m2): 1.66 (2022 15:44)    PAST MEDICAL & SURGICAL HISTORY:    No pertinent past medical history  No significant past surgical history    FAMILY HISTORY:    No pertinent family history in first degree relatives    ROS: See HPI; otherwise, all systems reviewed and negative.    O:    ICU Vital Signs Last 24 Hrs  T(C): 36.1 (2022 05:00), Max: 36.9 (2022 18:18)  T(F): 97 (2022 05:00), Max: 98.4 (2022 18:18)  HR: 134 (2022 12:00) (96 - 153)  BP: 138/98 (2022 12:00) (111/78 - 154/100)  BP(mean): 106 (2022 12:00) (85 - 114)  ABP: --  ABP(mean): --  RR: 20 (2022 08:50) (17 - 30)  SpO2: 96% (2022 08:50) (93% - 100%)    O2 Parameters below as of 2022 08:50  Patient On (Oxygen Delivery Method): room air    I&O's Detail    PE:    Adult F lying in bed  + anxious, resting tremor; exhibits paranoia  Alert, oriented to self and place  S1S2+ + sinus tachycardia  CTA B/L  Abd soft NTND  No leg swelling/edema noted  Skin pink and well perfused    LABS:    CBC Full  -  ( 2022 06:48 )  WBC Count : 4.43 K/uL  RBC Count : 2.90 M/uL  Hemoglobin : 11.1 g/dL  Hematocrit : 32.0 %  Platelet Count - Automated : 104 K/uL  Mean Cell Volume : 110.3 fl  Mean Cell Hemoglobin : 38.3 pg  Mean Cell Hemoglobin Concentration : 34.7 gm/dL  Auto Neutrophil # : x  Auto Lymphocyte # : x  Auto Monocyte # : x  Auto Eosinophil # : x  Auto Basophil # : x  Auto Neutrophil % : x  Auto Lymphocyte % : x  Auto Monocyte % : x  Auto Eosinophil % : x  Auto Basophil % : x    07-25    137  |  104  |  2<L>  ----------------------------<  94  3.4<L>   |  23  |  0.51    Ca    9.2      2022 06:48  Phos  2.7     07-25  Mg     1.6     07-25    TPro  7.3  /  Alb  3.7  /  TBili  1.4<H>  /  DBili  x   /  AST  128<H>  /  ALT  76  /  AlkPhos  41  07-25    PT/INR - ( 2022 06:48 )   PT: 11.7 sec;   INR: 1.01 ratio         PTT - ( 2022 06:48 )  PTT:26.5 sec  Urinalysis Basic - ( 2022 18:05 )    Color: Yellow / Appearance: Clear / S.010 / pH: x  Gluc: x / Ketone: Moderate  / Bili: Small / Urobili: 4   Blood: x / Protein: 30 mg/dL / Nitrite: Negative   Leuk Esterase: Trace / RBC: 0-2 /HPF / WBC 3-5   Sq Epi: x / Non Sq Epi: Moderate / Bacteria: Moderate      CAPILLARY BLOOD GLUCOSE        CARDIAC MARKERS ( 2022 11:54 )  x     / x     / 102 U/L / x     / x          LIVER FUNCTIONS - ( 2022 06:48 )  Alb: 3.7 g/dL / Pro: 7.3 gm/dL / ALK PHOS: 41 U/L / ALT: 76 U/L / AST: 128 U/L / GGT: x

## 2022-07-26 DIAGNOSIS — F19.94 OTHER PSYCHOACTIVE SUBSTANCE USE, UNSPECIFIED WITH PSYCHOACTIVE SUBSTANCE-INDUCED MOOD DISORDER: ICD-10-CM

## 2022-07-26 LAB
% ALBUMIN: 60.2 % — SIGNIFICANT CHANGE UP
% ALPHA 1: 6.6 % — SIGNIFICANT CHANGE UP
% ALPHA 2: 9.2 % — SIGNIFICANT CHANGE UP
% BETA: 11.1 % — SIGNIFICANT CHANGE UP
% GAMMA: 12.9 % — SIGNIFICANT CHANGE UP
ALBUMIN SERPL ELPH-MCNC: 2.9 G/DL — LOW (ref 3.3–5)
ALBUMIN SERPL ELPH-MCNC: 4 G/DL — SIGNIFICANT CHANGE UP (ref 3.6–5.5)
ALBUMIN/GLOB SERPL ELPH: 1.5 RATIO — SIGNIFICANT CHANGE UP
ALP SERPL-CCNC: 34 U/L — LOW (ref 40–120)
ALPHA1 GLOB SERPL ELPH-MCNC: 0.4 G/DL — SIGNIFICANT CHANGE UP (ref 0.1–0.4)
ALPHA2 GLOB SERPL ELPH-MCNC: 0.6 G/DL — SIGNIFICANT CHANGE UP (ref 0.5–1)
ALT FLD-CCNC: 54 U/L — SIGNIFICANT CHANGE UP (ref 12–78)
ANION GAP SERPL CALC-SCNC: 8 MMOL/L — SIGNIFICANT CHANGE UP (ref 5–17)
AST SERPL-CCNC: 67 U/L — HIGH (ref 15–37)
B-GLOBULIN SERPL ELPH-MCNC: 0.7 G/DL — SIGNIFICANT CHANGE UP (ref 0.5–1)
BILIRUB SERPL-MCNC: 0.9 MG/DL — SIGNIFICANT CHANGE UP (ref 0.2–1.2)
BUN SERPL-MCNC: 4 MG/DL — LOW (ref 7–23)
CALCIUM SERPL-MCNC: 9.1 MG/DL — SIGNIFICANT CHANGE UP (ref 8.5–10.1)
CHLORIDE SERPL-SCNC: 110 MMOL/L — HIGH (ref 96–108)
CO2 SERPL-SCNC: 20 MMOL/L — LOW (ref 22–31)
CREAT SERPL-MCNC: 0.58 MG/DL — SIGNIFICANT CHANGE UP (ref 0.5–1.3)
DSDNA AB FLD-ACNC: <0.2 AI — SIGNIFICANT CHANGE UP
EGFR: 123 ML/MIN/1.73M2 — SIGNIFICANT CHANGE UP
ENA SS-A AB FLD IA-ACNC: <0.2 AI — SIGNIFICANT CHANGE UP
GAMMA GLOBULIN: 0.9 G/DL — SIGNIFICANT CHANGE UP (ref 0.6–1.6)
GLUCOSE SERPL-MCNC: 122 MG/DL — HIGH (ref 70–99)
HCT VFR BLD CALC: 30.6 % — LOW (ref 34.5–45)
HGB BLD-MCNC: 10 G/DL — LOW (ref 11.5–15.5)
MAGNESIUM SERPL-MCNC: 2.1 MG/DL — SIGNIFICANT CHANGE UP (ref 1.6–2.6)
MCHC RBC-ENTMCNC: 32.7 GM/DL — SIGNIFICANT CHANGE UP (ref 32–36)
MCHC RBC-ENTMCNC: 37.3 PG — HIGH (ref 27–34)
MCV RBC AUTO: 114.2 FL — HIGH (ref 80–100)
PHOSPHATE SERPL-MCNC: 2.4 MG/DL — LOW (ref 2.5–4.5)
PLATELET # BLD AUTO: 95 K/UL — LOW (ref 150–400)
POTASSIUM SERPL-MCNC: 4.3 MMOL/L — SIGNIFICANT CHANGE UP (ref 3.5–5.3)
POTASSIUM SERPL-SCNC: 4.3 MMOL/L — SIGNIFICANT CHANGE UP (ref 3.5–5.3)
PROT PATTERN SERPL ELPH-IMP: SIGNIFICANT CHANGE UP
PROT SERPL-MCNC: 6.1 GM/DL — SIGNIFICANT CHANGE UP (ref 6–8.3)
RBC # BLD: 2.68 M/UL — LOW (ref 3.8–5.2)
RBC # FLD: 12.2 % — SIGNIFICANT CHANGE UP (ref 10.3–14.5)
SODIUM SERPL-SCNC: 138 MMOL/L — SIGNIFICANT CHANGE UP (ref 135–145)
WBC # BLD: 4.04 K/UL — SIGNIFICANT CHANGE UP (ref 3.8–10.5)
WBC # FLD AUTO: 4.04 K/UL — SIGNIFICANT CHANGE UP (ref 3.8–10.5)

## 2022-07-26 RX ORDER — ESCITALOPRAM OXALATE 10 MG/1
5 TABLET, FILM COATED ORAL DAILY
Refills: 0 | Status: DISCONTINUED | OUTPATIENT
Start: 2022-07-26 | End: 2022-07-29

## 2022-07-26 RX ADMIN — GABAPENTIN 300 MILLIGRAM(S): 400 CAPSULE ORAL at 05:06

## 2022-07-26 RX ADMIN — Medication 1 MILLIGRAM(S): at 09:33

## 2022-07-26 RX ADMIN — Medication 4 MILLIGRAM(S): at 23:55

## 2022-07-26 RX ADMIN — GABAPENTIN 300 MILLIGRAM(S): 400 CAPSULE ORAL at 23:34

## 2022-07-26 RX ADMIN — Medication 4 MILLIGRAM(S): at 17:15

## 2022-07-26 RX ADMIN — GABAPENTIN 300 MILLIGRAM(S): 400 CAPSULE ORAL at 13:03

## 2022-07-26 RX ADMIN — Medication 4 MILLIGRAM(S): at 00:38

## 2022-07-26 RX ADMIN — Medication 4 MILLIGRAM(S): at 12:06

## 2022-07-26 RX ADMIN — ESCITALOPRAM OXALATE 5 MILLIGRAM(S): 10 TABLET, FILM COATED ORAL at 12:06

## 2022-07-26 RX ADMIN — Medication 4 MILLIGRAM(S): at 05:07

## 2022-07-26 RX ADMIN — Medication 1 TABLET(S): at 09:33

## 2022-07-26 RX ADMIN — PANTOPRAZOLE SODIUM 40 MILLIGRAM(S): 20 TABLET, DELAYED RELEASE ORAL at 06:09

## 2022-07-26 RX ADMIN — Medication 255 MILLIGRAM(S): at 09:33

## 2022-07-26 RX ADMIN — ENOXAPARIN SODIUM 40 MILLIGRAM(S): 100 INJECTION SUBCUTANEOUS at 13:04

## 2022-07-26 RX ADMIN — SODIUM CHLORIDE 100 MILLILITER(S): 9 INJECTION, SOLUTION INTRAVENOUS at 00:26

## 2022-07-26 RX ADMIN — SODIUM CHLORIDE 100 MILLILITER(S): 9 INJECTION, SOLUTION INTRAVENOUS at 09:33

## 2022-07-26 NOTE — CHART NOTE - NSCHARTNOTEFT_GEN_A_CORE
Called  Rk at # listed in EMR  No answer, VM left for call back Called  Rk at # listed in EMR  No answer, VM left for call back    ADDENDUM #1: Rk called back.    Full, detailed conversation was had.    Per Rk, Pt ~ 10 drinks a day (described as 0.5-1 bottle tequila daily + wine) since losing job during COVID 2 years ago. Pt had had lower extremity neurologic symptoms which they attribute to 2nd COVID vaccine received last year. Has seen PMD and rheumatologist for this; lab work done, started on gabapentin, no other consultants seen or workup performed. Val sleeps up to 16 hours a day, capable of all ADL's, still drives, but Rk describes "bad days to weeks" where Val has trouble functioning, even ambulating to bathroom.    I explained her current acute Dx of alcohol withdrawal and current treatment and prognosis for this. I also explained current ongoing w/u for neurologic issues, including neuro input. All questions answered and support offered.

## 2022-07-26 NOTE — BH CONSULTATION LIAISON ASSESSMENT NOTE - NSICDXBHPRIMARYDX_PSY_ALL_CORE
Addended by: Elizabeth Chacko on: 3/18/2021 09:34 AM     Modules accepted: Orders ETOH abuse   F10.10

## 2022-07-26 NOTE — PROGRESS NOTE ADULT - ASSESSMENT
A:    32F  HD # 4  FULL CODE    Here for:    1. Alcohol withdrawal syndrome/DT's, refractory to BZD therapy  2. Transaminitis  3. Hypokalemia  4. Hypomagnesemia  5. Neuropathy, unclear etiology  6. Alcoholic/starvation ketosis    This patient requires critical care for support of one or more vital organ systems with a high probability of imminent or life threatening deterioration in his/her condition    P:    Pt BIBA with c/o of weakness in extremities  Being w/u for neuropathy, Neurology on board; MRI B, cspine OK (albeit w/o catherine), esr/crp OK, pending rheum labs    Improving severe DT's    Precedex off as improving. Start titrating ativan down from 4mg q4 to 4mg q6h.   Pending Psych consult.  + FHX (mother) MS. Pt carries Dx of fibromyalgia; continue gabapentin 300mg PO TID. MRI B, cspine without acute pathology. Remainder of neuropathy w/u per Neuro. High dose thiamine 500mg IV qd x 3 days for empiric Tx Wernicke's encephalopathy.  HD monitoring. Sinus tachycardia 2/2 DT's, improving.  IS, OOB as able.   Maintain 1:1 for patient safety.   Continue diet as tolerated.  + mod ketones on UA on admission; ? etoh +/- starvation ketosis. Continue D5LR for 1 more day, rpt UA today for resolution of ketosis.  Thiamine, folate.   UA on admit + LE, + bacturia; no fevers or white count, will send Cx prior to Tx. Send full tick panel given neurologic issues, tick season, does not endorse.  Continue D5LR @ 100cc/hr x 24h.  f/u labs.   Hgb 10, Plt 95, no s/s active bleeding. Macrocytic anemia; B12 lvl nl, so likely 2/2 folate deficiency.   VTE ppx PUD ppx.    Dispo: Continue critical care for DT's. ATC ativan + precedex as adjunct PRN. Neuropathy w/u per Neurology. Send UCx. Hydrate. Replete lytes. Pending psych consult.     TOTAL CRITICAL CARE TIME: 38 minutes (EXCLUSIVE of any non bundled procedures)    Note: This time spent INCLUDES time spent directly as this patient's bedside with evaluation, review of chart including review of laboratory and imaging studies, interpretation of vital signs and cardiac output measurements, any necessary ventilator management, and time spent discussing plan of care with patient and family, including goals of care discussion.

## 2022-07-26 NOTE — PROGRESS NOTE ADULT - SUBJECTIVE AND OBJECTIVE BOX
Pt mental sensorium slightly improved today, is engaging little bit but still confused.    As per PA's conversation with family, pts mother has MS and ot has FM    ROS: As above, generalized weakness, tremors of hands, other ROS Negative      MEDICATIONS  (STANDING):  dextrose 5% + lactated ringers. 1000 milliLiter(s) (100 mL/Hr) IV Continuous <Continuous>  enoxaparin Injectable 40 milliGRAM(s) SubCutaneous every 24 hours  escitalopram 5 milliGRAM(s) Oral daily  folic acid 1 milliGRAM(s) Oral daily  gabapentin 300 milliGRAM(s) Oral three times a day  LORazepam   Injectable 4 milliGRAM(s) IV Push every 6 hours  multivitamin 1 Tablet(s) Oral daily  pantoprazole    Tablet 40 milliGRAM(s) Oral before breakfast  thiamine IVPB 500 milliGRAM(s) IV Intermittent daily      Vital Signs Last 24 Hrs  T(C): 37 (26 Jul 2022 11:36), Max: 37.1 (26 Jul 2022 06:00)  T(F): 98.6 (26 Jul 2022 11:36), Max: 98.7 (26 Jul 2022 06:00)  HR: 101 (26 Jul 2022 11:00) (88 - 134)  BP: 117/78 (26 Jul 2022 11:00) (85/58 - 129/93)  BP(mean): 86 (26 Jul 2022 11:00) (64 - 101)  RR: 19 (26 Jul 2022 11:00) (16 - 26)  SpO2: 97% (26 Jul 2022 11:00) (97% - 100%)      Neurological Exam:  HF: Alert, confused, knows she in HH, flat affect, no aphasia or dysarthria, follows commands   CN: PERLL, EOMI, V1-3 sensation intact, face symmetric, hearing intact, tongue midline, SCM equal bilaterally  Motor: normal bulk, Limited elevation of Lower extremities due to pain, proximal LE 4+/5, distal 5/5   Sens: Intact to light touch.    Reflexes: 2-3/4 all over, downgoing toes b/l  Coord:  No gross dysmetria,  Gait: Cannot test  Labs:  Aspartate Aminotransferase (AST/SGOT): 128 U/L   Alanine Aminotransferase (ALT/SGPT): 76 U/L   Creatine Kinase, Serum: 102 U/L (07.24.22 @ 11:54)     Lyme C6 Interpretation: Negative:     Treponema Pallidum Antibody Interpretation: Negative:    Rheumatoid Factor Quant, Serum or Plasma: <10:    Vitamin B12, Serum: 348 pg/mL (07.24.22 @ 04:17)                         10.0   4.04  )-----------( 95       ( 26 Jul 2022 06:09 )             30.6     07-26    138  |  110<H>  |  4<L>  ----------------------------<  122<H>  4.3   |  20<L>  |  0.58    TPro  6.1  /  Alb  2.9<L>  /  TBili  0.9  /  DBili  x   /  AST  67<H>  /  ALT  54  /  AlkPhos  34<L>  07-26      Radiology report:  MR brain:   Unremarkable MR of the brain.    MR cervical spine: Limited examination. Minimal disc degeneration and   spondylosis at C4-5 and C5-6.

## 2022-07-26 NOTE — BH CONSULTATION LIAISON ASSESSMENT NOTE - NSBHCHARTREVIEWVS_PSY_A_CORE FT
Vital Signs Last 24 Hrs  T(C): 37.1 (26 Jul 2022 06:00), Max: 37.1 (26 Jul 2022 06:00)  T(F): 98.7 (26 Jul 2022 06:00), Max: 98.7 (26 Jul 2022 06:00)  HR: 95 (26 Jul 2022 10:00) (88 - 134)  BP: 108/69 (26 Jul 2022 10:00) (85/58 - 138/98)  BP(mean): 77 (26 Jul 2022 10:00) (64 - 106)  RR: 18 (26 Jul 2022 10:00) (16 - 26)  SpO2: 97% (26 Jul 2022 10:00) (97% - 100%)    Parameters below as of 26 Jul 2022 10:00  Patient On (Oxygen Delivery Method): room air

## 2022-07-26 NOTE — BH CONSULTATION LIAISON ASSESSMENT NOTE - NSBHCHARTREVIEWLAB_PSY_A_CORE FT
10.0   4.04  )-----------( 95       ( 26 Jul 2022 06:09 )             30.6   07-26    138  |  110<H>  |  4<L>  ----------------------------<  122<H>  4.3   |  20<L>  |  0.58    Ca    9.1      26 Jul 2022 06:09  Phos  2.4     07-26  Mg     2.1     07-26    TPro  6.1  /  Alb  2.9<L>  /  TBili  0.9  /  DBili  x   /  AST  67<H>  /  ALT  54  /  AlkPhos  34<L>  07-26

## 2022-07-26 NOTE — PROGRESS NOTE ADULT - SUBJECTIVE AND OBJECTIVE BOX
ICU Progress Note    HPI:    S:    Pt seen and examined  HD # 4  FULL CODE  PMHx IBS  BIBA from home after reportedly falling out of bed today. Of note the patient notes that since receiving her COVID-19 vaccination on 5/1/2021 she has had increased symptoms of bilateral leg pain, and paresthesias leading to increased difficulty with ambulation. Additionally the patient states her symptoms have worsened over the past few weeks and reports at times "her legs were not moving." Upon the patient's fall out of bed the patient denied any associated head trauma, or prodrome of nausea, vomiting, or subjective fevers. Of note the patient admits to drinking multiple glasses of wine daily for months as well, was last drink last night. The patient states that she is being followed by her PCP for her tremors and lower extremity symptoms with no remarkable work up thus far.   Vital signs in triage => /59, /min, RR 22/min, Tmax 98.5'F SpO2 100% on room air. Labs => MCV/.9/37.5, , Na 133, Glu 126, TBili 2.1, AST//127, Mg 1.0, TSH 4.71, UA (+) with Ketones. CT Head => No acute intracranial hemorrhage, hydrocephalus or extra-axial fluid collection. No CT evidence for acute transcortical infarction. Bilateral Lower Extremity Venous Duplex => No evidence of deep venous thrombosis in either lower extremity. In the ED the patient received Ceftriaxone 1g IVP x 1, Ketorolac 15mg IVP x 1, Lorazepam 2mg IVP x 2, Magnesium sulfate 2g IVPB x 1, and NS x 2L    7/25 AM: Tachycardic, confused, tremors despite high dose BZD therapy prompting consult.     7/26 AM: Some improvement in confusion and tachycardia.    ROS: + weakness + tremors  Remainder of systems reviewed, negative    Allergies    No Known Allergies    Intolerances    MEDICATIONS  (STANDING):    dexMEDEtomidine Infusion 0.5 MICROgram(s)/kG/Hr (7.94 mL/Hr) IV Continuous <Continuous>  dextrose 5% + lactated ringers. 1000 milliLiter(s) (100 mL/Hr) IV Continuous <Continuous>  enoxaparin Injectable 40 milliGRAM(s) SubCutaneous every 24 hours  folic acid 1 milliGRAM(s) Oral daily  gabapentin 300 milliGRAM(s) Oral three times a day  LORazepam   Injectable 4 milliGRAM(s) IV Push every 4 hours  multivitamin 1 Tablet(s) Oral daily  pantoprazole    Tablet 40 milliGRAM(s) Oral before breakfast  thiamine IVPB 500 milliGRAM(s) IV Intermittent daily    MEDICATIONS  (PRN):    acetaminophen     Tablet .. 650 milliGRAM(s) Oral every 6 hours PRN Mild Pain (1 - 3)  LORazepam   Injectable 4 milliGRAM(s) IV Push every 1 hour PRN Agitation    Drug Dosing Weight    Height (cm): 160 (23 Jul 2022 15:44)  Weight (kg): 63.5 (23 Jul 2022 15:44)  BMI (kg/m2): 24.8 (23 Jul 2022 15:44)  BSA (m2): 1.66 (23 Jul 2022 15:44)    PAST MEDICAL & SURGICAL HISTORY:    No pertinent past medical history  No significant past surgical history    FAMILY HISTORY:    No pertinent family history in first degree relatives    ROS: See HPI; otherwise, all systems reviewed and negative.    O:    ICU Vital Signs Last 24 Hrs  T(C): 37.1 (26 Jul 2022 06:00), Max: 37.1 (26 Jul 2022 06:00)  T(F): 98.7 (26 Jul 2022 06:00), Max: 98.7 (26 Jul 2022 06:00)  HR: 116 (26 Jul 2022 08:00) (88 - 134)  BP: 113/76 (26 Jul 2022 08:00) (85/58 - 138/98)  BP(mean): 85 (26 Jul 2022 08:00) (64 - 106)  ABP: --  ABP(mean): --  RR: 22 (26 Jul 2022 08:00) (16 - 26)  SpO2: 97% (26 Jul 2022 08:00) (97% - 100%)    O2 Parameters below as of 26 Jul 2022 08:00  Patient On (Oxygen Delivery Method): room air    I&O's Detail    25 Jul 2022 07:01  -  26 Jul 2022 07:00  --------------------------------------------------------  IN:    dextrose 5% + lactated ringers: 1100 mL  Total IN: 1100 mL    OUT:  Total OUT: 0 mL    Total NET: 1100 mL      26 Jul 2022 07:01  -  26 Jul 2022 09:04  --------------------------------------------------------  IN:    dextrose 5% + lactated ringers: 200 mL  Total IN: 200 mL    OUT:    Dexmedetomidine: 0 mL  Total OUT: 0 mL    Total NET: 200 mL    PE:    Adult F lying in bed  + anxious, resting tremor but improved  Alert, oriented to self and place  S1S2+ + sinus tachycardia, improved  CTA B/L  Abd soft NTND  No leg swelling/edema noted  Skin pink and well perfused    LABS:    CBC Full  -  ( 26 Jul 2022 06:09 )  WBC Count : 4.04 K/uL  RBC Count : 2.68 M/uL  Hemoglobin : 10.0 g/dL  Hematocrit : 30.6 %  Platelet Count - Automated : 95 K/uL  Mean Cell Volume : 114.2 fl  Mean Cell Hemoglobin : 37.3 pg  Mean Cell Hemoglobin Concentration : 32.7 gm/dL  Auto Neutrophil # : x  Auto Lymphocyte # : x  Auto Monocyte # : x  Auto Eosinophil # : x  Auto Basophil # : x  Auto Neutrophil % : x  Auto Lymphocyte % : x  Auto Monocyte % : x  Auto Eosinophil % : x  Auto Basophil % : x    07-26    138  |  110<H>  |  4<L>  ----------------------------<  122<H>  4.3   |  20<L>  |  0.58    Ca    9.1      26 Jul 2022 06:09  Phos  2.4     07-26  Mg     2.1     07-26    TPro  6.1  /  Alb  2.9<L>  /  TBili  0.9  /  DBili  x   /  AST  67<H>  /  ALT  54  /  AlkPhos  34<L>  07-26    PT/INR - ( 25 Jul 2022 06:48 )   PT: 11.7 sec;   INR: 1.01 ratio         PTT - ( 25 Jul 2022 06:48 )  PTT:26.5 sec    CAPILLARY BLOOD GLUCOSE        CARDIAC MARKERS ( 24 Jul 2022 11:54 )  x     / x     / 102 U/L / x     / x          LIVER FUNCTIONS - ( 26 Jul 2022 06:09 )  Alb: 2.9 g/dL / Pro: 6.1 gm/dL / ALK PHOS: 34 U/L / ALT: 54 U/L / AST: 67 U/L / GGT: x

## 2022-07-26 NOTE — BH CONSULTATION LIAISON ASSESSMENT NOTE - CURRENT MEDICATION
MEDICATIONS  (STANDING):  dextrose 5% + lactated ringers. 1000 milliLiter(s) (100 mL/Hr) IV Continuous <Continuous>  enoxaparin Injectable 40 milliGRAM(s) SubCutaneous every 24 hours  escitalopram 5 milliGRAM(s) Oral daily  folic acid 1 milliGRAM(s) Oral daily  gabapentin 300 milliGRAM(s) Oral three times a day  LORazepam   Injectable 4 milliGRAM(s) IV Push every 6 hours  multivitamin 1 Tablet(s) Oral daily  pantoprazole    Tablet 40 milliGRAM(s) Oral before breakfast  thiamine IVPB 500 milliGRAM(s) IV Intermittent daily    MEDICATIONS  (PRN):  acetaminophen     Tablet .. 650 milliGRAM(s) Oral every 6 hours PRN Mild Pain (1 - 3)  LORazepam   Injectable 4 milliGRAM(s) IV Push every 1 hour PRN Agitation

## 2022-07-26 NOTE — PROGRESS NOTE ADULT - ASSESSMENT
33 y/o woman BIBA from home after falling out of bed, with c/o  bilateral leg pain, weakness, paresthesias. Noted to have transaminitis, hypokal, hypomag, alcoholic/starvation ketosis    On exam, pt has hyperreflexia, hyertonia lower extremities and proximal LE weakness.    # R/O demyelinating disorder / myelitis, MR brain is unremarkable hence MS is less likely (mother has MS), MR cervical spine was incomplete, thoracic not done yet.     #  Alcohol withdrawal syndrome/DT's, refractory to BZD therapy    - Recommend MRI Cer and thoracic spine  - f/u exam after MS/DT's resolve fully    Above D/W DIANA Arias

## 2022-07-26 NOTE — BH CONSULTATION LIAISON ASSESSMENT NOTE - HPI (INCLUDE ILLNESS QUALITY, SEVERITY, DURATION, TIMING, CONTEXT, MODIFYING FACTORS, ASSOCIATED SIGNS AND SYMPTOMS)
Pt is a 32 YOWMW, no formal psych hx, unemployed for about 2 years, and drinking for more than 1 year. Now she has 5 drinks a day,. wine ot tequila, was not in treatment  or rehab, admitted for detox. Pt reports feeling depressed and anxious. Sleep is erratic "I can sleep 16 h or not al all".   Pt is tired. Denies ever having SI, HI, AH, VH, PI. She has plans for future,  wants to work   No hx of trauma, sometimes smokes marijuana, denies other drugs.  pt IS WILING TO STAR ANTIDEPRESSANT AND F/U WITH PROVIDER OUTSIDE ONCE D/TATUM.

## 2022-07-26 NOTE — BH CONSULTATION LIAISON ASSESSMENT NOTE - RISK ASSESSMENT
Low acute risk: No SI, mildly depressed and EtOH abuse.   Moderate long term risk: EtOH abuse  protective factors: sportive family,  wants help.

## 2022-07-26 NOTE — BH CONSULTATION LIAISON ASSESSMENT NOTE - SUMMARY
Pt is a 32 YOWMW, no formal psych hx, unemployed for about 2 years, and drinking for more than 1 year. Now she has 5 drinks a day,. wine ot jeromy, was not in treatment  or rehab, admitted for detox. Pt reports feeling depressed and anxious. Sleep is erratic "I can sleep 16 h or not al all".   Pt is tired. Denies ever having SI, HI, AH, VH, PI. She has plans for future,  wants to work   No hx of trauma, sometimes smokes marijuana, denies other drugs.  pt IS WILING TO STAR ANTIDEPRESSANT AND F/U WITH PROVIDER OUTSIDE ONCE D/TATUM.      Plan: complete [facility] detox protocol.   Start lexapro, side effects and benefits discussed and pt agrees.   Pt would  benefit form rehab if she agrees outpatient or inpatient. CSW to make a referral

## 2022-07-27 LAB
ANION GAP SERPL CALC-SCNC: 6 MMOL/L — SIGNIFICANT CHANGE UP (ref 5–17)
BUN SERPL-MCNC: 5 MG/DL — LOW (ref 7–23)
CALCIUM SERPL-MCNC: 9 MG/DL — SIGNIFICANT CHANGE UP (ref 8.5–10.1)
CHLORIDE SERPL-SCNC: 109 MMOL/L — HIGH (ref 96–108)
CO2 SERPL-SCNC: 23 MMOL/L — SIGNIFICANT CHANGE UP (ref 22–31)
CREAT SERPL-MCNC: 0.44 MG/DL — LOW (ref 0.5–1.3)
CULTURE RESULTS: SIGNIFICANT CHANGE UP
DSDNA AB SER-ACNC: <12 IU/ML — SIGNIFICANT CHANGE UP
EGFR: 132 ML/MIN/1.73M2 — SIGNIFICANT CHANGE UP
GLUCOSE SERPL-MCNC: 83 MG/DL — SIGNIFICANT CHANGE UP (ref 70–99)
HCT VFR BLD CALC: 30 % — LOW (ref 34.5–45)
HGB BLD-MCNC: 10.4 G/DL — LOW (ref 11.5–15.5)
MAGNESIUM SERPL-MCNC: 2.2 MG/DL — SIGNIFICANT CHANGE UP (ref 1.6–2.6)
MCHC RBC-ENTMCNC: 34.7 GM/DL — SIGNIFICANT CHANGE UP (ref 32–36)
MCHC RBC-ENTMCNC: 38.8 PG — HIGH (ref 27–34)
MCV RBC AUTO: 111.9 FL — HIGH (ref 80–100)
PHOSPHATE SERPL-MCNC: 4.6 MG/DL — HIGH (ref 2.5–4.5)
PLATELET # BLD AUTO: 113 K/UL — LOW (ref 150–400)
POTASSIUM SERPL-MCNC: 4 MMOL/L — SIGNIFICANT CHANGE UP (ref 3.5–5.3)
POTASSIUM SERPL-SCNC: 4 MMOL/L — SIGNIFICANT CHANGE UP (ref 3.5–5.3)
RBC # BLD: 2.68 M/UL — LOW (ref 3.8–5.2)
RBC # FLD: 12.2 % — SIGNIFICANT CHANGE UP (ref 10.3–14.5)
SODIUM SERPL-SCNC: 138 MMOL/L — SIGNIFICANT CHANGE UP (ref 135–145)
SPECIMEN SOURCE: SIGNIFICANT CHANGE UP
WBC # BLD: 4.27 K/UL — SIGNIFICANT CHANGE UP (ref 3.8–10.5)
WBC # FLD AUTO: 4.27 K/UL — SIGNIFICANT CHANGE UP (ref 3.8–10.5)

## 2022-07-27 RX ORDER — PREGABALIN 225 MG/1
1000 CAPSULE ORAL DAILY
Refills: 0 | Status: DISCONTINUED | OUTPATIENT
Start: 2022-07-27 | End: 2022-07-29

## 2022-07-27 RX ADMIN — Medication 4 MILLIGRAM(S): at 06:14

## 2022-07-27 RX ADMIN — Medication 2 MILLIGRAM(S): at 23:02

## 2022-07-27 RX ADMIN — Medication 1 MILLIGRAM(S): at 10:40

## 2022-07-27 RX ADMIN — PANTOPRAZOLE SODIUM 40 MILLIGRAM(S): 20 TABLET, DELAYED RELEASE ORAL at 06:14

## 2022-07-27 RX ADMIN — GABAPENTIN 300 MILLIGRAM(S): 400 CAPSULE ORAL at 15:01

## 2022-07-27 RX ADMIN — ENOXAPARIN SODIUM 40 MILLIGRAM(S): 100 INJECTION SUBCUTANEOUS at 15:01

## 2022-07-27 RX ADMIN — Medication 1 TABLET(S): at 10:40

## 2022-07-27 RX ADMIN — Medication 2 MILLIGRAM(S): at 18:37

## 2022-07-27 RX ADMIN — ESCITALOPRAM OXALATE 5 MILLIGRAM(S): 10 TABLET, FILM COATED ORAL at 10:40

## 2022-07-27 RX ADMIN — PREGABALIN 1000 MICROGRAM(S): 225 CAPSULE ORAL at 23:02

## 2022-07-27 RX ADMIN — GABAPENTIN 300 MILLIGRAM(S): 400 CAPSULE ORAL at 23:02

## 2022-07-27 RX ADMIN — Medication 255 MILLIGRAM(S): at 10:40

## 2022-07-27 RX ADMIN — Medication 2 MILLIGRAM(S): at 12:30

## 2022-07-27 RX ADMIN — GABAPENTIN 300 MILLIGRAM(S): 400 CAPSULE ORAL at 06:14

## 2022-07-27 NOTE — PROGRESS NOTE ADULT - ASSESSMENT
Patient with improving DTs, hx. of lower extremitiy weakness for months  will decrease ativan to oral 1 q 6  d/c 1:1  will discuss with neurology further w/u of lower extremity weakness  Physical therapy  may transfer to floor Patient with improving DTs, hx. of lower extremitiy weakness for months  will decrease ativan to oral 1 q 6  d/c 1:1  will discuss with neurology further w/u of lower extremity weakness  Physical therapy  may transfer to floor signed out to hospitalist dr. gamble

## 2022-07-27 NOTE — PROGRESS NOTE ADULT - SUBJECTIVE AND OBJECTIVE BOX
Events Overnight:  Patient was on Ativan 4 mg q6 yesterday, much more lucid today, still complaining of lower extremity        weakness, which has been chronic. was started on Lexapro    HPI:    Hospital Day number 5.     BIBA from home after reportedly falling out of bed Patient notes that since receiving her COVID-19 vaccination on 5/1/2021 she has had increased symptoms of bilateral leg pain, and paresthesias leading to increased difficulty with ambulation  . Of note the patient admits to drinking multiple glasses of wine daily for months.       In ED  TBili 2.1, AST//127, Mg 1.0, TSH 4.71, UA (+) with Ketones. CT Head => Negative   Critical Care was consulted on 7/25 for confusion ,tremors  Had MRI brain, Neck neck for pathology  had negative venous doppers  7/27 AM: improvement in confusion     PMH:        alcoholixm    ROS no chest pain, no sob, no abdominal pain, still slight tingling of lower extremiteis, slight weakness, ros otherwise negative     MEDICATIONS  (STANDING):  enoxaparin Injectable 40 milliGRAM(s) SubCutaneous every 24 hours  escitalopram 5 milliGRAM(s) Oral daily  folic acid 1 milliGRAM(s) Oral daily  gabapentin 300 milliGRAM(s) Oral three times a day  LORazepam     Tablet 2 milliGRAM(s) Oral every 6 hours  multivitamin 1 Tablet(s) Oral daily  pantoprazole    Tablet 40 milliGRAM(s) Oral before breakfast  thiamine IVPB 500 milliGRAM(s) IV Intermittent daily    MEDICATIONS  (PRN):  acetaminophen     Tablet .. 650 milliGRAM(s) Oral every 6 hours PRN Mild Pain (1 - 3)    ICU Vital Signs Last 24 Hrs  T(C): 37.3 (27 Jul 2022 06:00), Max: 37.3 (27 Jul 2022 06:00)  T(F): 99.2 (27 Jul 2022 06:00), Max: 99.2 (27 Jul 2022 06:00)  HR: 89 (27 Jul 2022 06:00) (84 - 116)  BP: 115/72 (27 Jul 2022 06:00) (108/69 - 132/93)  BP(mean): 83 (27 Jul 2022 06:00) (77 - 101)  ABP: --  ABP(mean): --  RR: 17 (27 Jul 2022 06:00) (14 - 24)  SpO2: 95% (27 Jul 2022 06:00) (93% - 99%)    O2 Parameters below as of 26 Jul 2022 19:00  Patient On (Oxygen Delivery Method): room air    Physical Exam    General no distress  HEENT - nc/at   neck supple  lungs clear  cv rrr  abdomen - soft, non tender  neuro minimally tremulous, awake alert  psych no suicidal ideation    I&O's Summary    26 Jul 2022 07:01  -  27 Jul 2022 07:00  --------------------------------------------------------  IN: 2250 mL / OUT: 1300 mL / NET: 950 mL                        10.4   4.27  )-----------( 113      ( 27 Jul 2022 05:18 )             30.0       07-27    138  |  109<H>  |  5<L>  ----------------------------<  83  4.0   |  23  |  0.44<L>    Ca    9.0      27 Jul 2022 05:18  Phos  4.6     07-27  Mg     2.2     07-27    TPro  6.1  /  Alb  2.9<L>  /  TBili  0.9  /  DBili  x   /  AST  67<H>  /  ALT  54  /  AlkPhos  34<L>  07-26    DVT Prophylaxis:  Lovenox                                                               Advanced Directives: Full COde

## 2022-07-27 NOTE — PROGRESS NOTE ADULT - SUBJECTIVE AND OBJECTIVE BOX
Interval History:  7/27/22: Continues to report weakness, numbness in feet.    MEDICATIONS  (STANDING):  enoxaparin Injectable 40 milliGRAM(s) SubCutaneous every 24 hours  escitalopram 5 milliGRAM(s) Oral daily  folic acid 1 milliGRAM(s) Oral daily  gabapentin 300 milliGRAM(s) Oral three times a day  LORazepam     Tablet 2 milliGRAM(s) Oral every 6 hours  multivitamin 1 Tablet(s) Oral daily  pantoprazole    Tablet 40 milliGRAM(s) Oral before breakfast  thiamine IVPB 500 milliGRAM(s) IV Intermittent daily    MEDICATIONS  (PRN):  acetaminophen     Tablet .. 650 milliGRAM(s) Oral every 6 hours PRN Mild Pain (1 - 3)      Allergies    No Known Allergies    Intolerances        PHYSICAL EXAM:  Vital Signs Last 24 Hrs  T(F): 99 (07-27-22 @ 08:30)  HR: 89 (07-27-22 @ 06:00)  BP: 115/72 (07-27-22 @ 06:00)  RR: 17 (07-27-22 @ 06:00)    GENERAL: NAD, well-groomed  HEAD:  Atraumatic, Normocephalic  Neuro:  HF: Awake and alert.  Flat affect, no aphasia or dysarthria, follows commands   CN: PERLL, EOMI, V1-3 sensation intact, face symmetric, hearing intact, tongue midline, SCM equal bilaterally  Motor: normal bulk, Limited elevation of Lower extremities due to pain, proximal LE 4+/5, distal 5/5   Sens: Reports decreased sensation in feet, joint position sense intact  Reflexes: 2+ in biceps, radialis, patellars,  downgoing toes b/l, Lara's negative bilaterally  Coord:  No gross dysmetria,  Gait: Cannot test        LABS:                        10.4   4.27  )-----------( 113      ( 27 Jul 2022 05:18 )             30.0     07-27    138  |  109<H>  |  5<L>  ----------------------------<  83  4.0   |  23  |  0.44<L>    Ca    9.0      27 Jul 2022 05:18  Phos  4.6     07-27  Mg     2.2     07-27    TPro  6.1  /  Alb  2.9<L>  /  TBili  0.9  /  DBili  x   /  AST  67<H>  /  ALT  54  /  AlkPhos  34<L>  07-26          RADIOLOGY & ADDITIONAL STUDIES:  MRI head and cervical spine 7/24/22:  MR brain:   Unremarkable MR of the brain.      MR cervical spine: Limited examination. Minimal disc degeneration and   spondylosis at C4-5 and C5-6.

## 2022-07-27 NOTE — PHYSICAL THERAPY INITIAL EVALUATION ADULT - MODALITIES TREATMENT COMMENTS
pt left seated in chair post Eval; chair alarm donned; all above lines/monitors in place; 1:1 in progress; callbell in reach; pt instructed not to get up alone; call nursing for assist; jacqueline well; LE pain 8/10; RN Becca made aware pt OOB

## 2022-07-27 NOTE — PHYSICAL THERAPY INITIAL EVALUATION ADULT - GENERAL OBSERVATIONS, REHAB EVAL
HM; BP cuff; pulse oxym; pt rec'd in bed supine; HR 96; /70; O2 Sat 94%; Bilat LE pain 7/10; RN Becca aware; 1:1 in progress

## 2022-07-27 NOTE — PHYSICAL THERAPY INITIAL EVALUATION ADULT - MANUAL MUSCLE TESTING RESULTS, REHAB EVAL
Bilat UE: shld FE 4+5; elbow flex/ext 4+/5;  4-/5; Bilat LE: hip/knee flex 4/5; knee ext 4+/5; ankle PF/DF 4+/5

## 2022-07-27 NOTE — PROGRESS NOTE ADULT - ASSESSMENT
33 y/o woman BIBA from home after falling out of bed, with c/o  bilateral leg pain, weakness, paresthesias. Noted to have transaminitis, hypokal, hypomag, alcoholic/starvation ketosis    On exam, pt has hyperreflexia, hyertonia lower extremities and proximal LE weakness.    MRI head is unremarkable  Repeat MRI cervical spine and image T spine to r/o demyelinating disorder, myelitis  Vitamin B12 is 348 which is on the lower end of normal. Will supplement B12 1000 mcg/day  Consider checking copper levels    ETOH withdrawal protocol as per CCU

## 2022-07-28 LAB
A PHAGOCYTOPH IGG TITR SER IF: SIGNIFICANT CHANGE UP TITER
ANA TITR SER: NEGATIVE — SIGNIFICANT CHANGE UP
B BURGDOR AB SER QL IA: NEGATIVE — SIGNIFICANT CHANGE UP
B MICROTI IGG TITR SER: SIGNIFICANT CHANGE UP TITER
E CHAFFEENSIS IGG TITR SER IF: SIGNIFICANT CHANGE UP TITER

## 2022-07-28 RX ORDER — THIAMINE MONONITRATE (VIT B1) 100 MG
100 TABLET ORAL DAILY
Refills: 0 | Status: DISCONTINUED | OUTPATIENT
Start: 2022-07-28 | End: 2022-07-29

## 2022-07-28 RX ADMIN — Medication 100 MILLIGRAM(S): at 10:18

## 2022-07-28 RX ADMIN — ESCITALOPRAM OXALATE 5 MILLIGRAM(S): 10 TABLET, FILM COATED ORAL at 10:22

## 2022-07-28 RX ADMIN — GABAPENTIN 300 MILLIGRAM(S): 400 CAPSULE ORAL at 14:54

## 2022-07-28 RX ADMIN — Medication 2 MILLIGRAM(S): at 18:28

## 2022-07-28 RX ADMIN — Medication 1 TABLET(S): at 10:18

## 2022-07-28 RX ADMIN — ENOXAPARIN SODIUM 40 MILLIGRAM(S): 100 INJECTION SUBCUTANEOUS at 14:54

## 2022-07-28 RX ADMIN — PANTOPRAZOLE SODIUM 40 MILLIGRAM(S): 20 TABLET, DELAYED RELEASE ORAL at 10:18

## 2022-07-28 RX ADMIN — Medication 2 MILLIGRAM(S): at 05:57

## 2022-07-28 RX ADMIN — GABAPENTIN 300 MILLIGRAM(S): 400 CAPSULE ORAL at 21:04

## 2022-07-28 RX ADMIN — Medication 2 MILLIGRAM(S): at 12:48

## 2022-07-28 RX ADMIN — PREGABALIN 1000 MICROGRAM(S): 225 CAPSULE ORAL at 10:22

## 2022-07-28 RX ADMIN — GABAPENTIN 300 MILLIGRAM(S): 400 CAPSULE ORAL at 05:57

## 2022-07-28 RX ADMIN — Medication 1 MILLIGRAM(S): at 10:18

## 2022-07-28 NOTE — DIETITIAN INITIAL EVALUATION ADULT - PERTINENT LABORATORY DATA
07-27    138  |  109<H>  |  5<L>  ----------------------------<  83  4.0   |  23  |  0.44<L>    Ca    9.0      27 Jul 2022 05:18  Phos  4.6     07-27  Mg     2.2     07-27

## 2022-07-28 NOTE — DIETITIAN INITIAL EVALUATION ADULT - NUTRITION DIAGNOSITC TERMINOLOGY #1
64 yo male with cerebral palsy, group home resident, known to  service. Pt had SPT placed by [facility] 6 weeks ago, it was dislodged and replaced at [facility] by IR 3/15/19, pt was brought to ED 2/2 to not draining SPT clogged by sediment, s/p irrigation with + UO.  Pt. was seen and examined at bedside ED together with Dr. Cooper.     Vital Signs Last 24 Hrs  T(C): 36.9 (26 Mar 2019 16:28), Max: 36.9 (26 Mar 2019 16:28)  T(F): 98.5 (26 Mar 2019 16:28), Max: 98.5 (26 Mar 2019 16:28)  HR: 77 (26 Mar 2019 16:28) (73 - 77)  BP: 142/82 (26 Mar 2019 16:28) (132/65 - 142/82)  RR: 20 (26 Mar 2019 16:28) (20 - 20)  SpO2: 98% (26 Mar 2019 16:28) (98% - 98%)      PE:  General: Awake and Alert in NAD  Abd; Soft SPT in place drains yellow urine with sediment, + erythema around the tube. abdomen not tender.   : male genitalia , + balanitis, testis descended B/L no ttp.        LABS:                        13.5   6.05  )-----------( 217      ( 25 Mar 2019 22:30 )             38.2     03-25    139  |  103  |  20  ----------------------------<  96  4.0   |  25  |  0.5<L>    Ca    8.9      25 Mar 2019 22:30    TPro  6.6  /  Alb  4.0  /  TBili  0.2  /  DBili  x   /  AST  19  /  ALT  16  /  AlkPhos  87  03-25 Malnutrition...

## 2022-07-28 NOTE — PROGRESS NOTE ADULT - ASSESSMENT
31 y/o woman BIBA from home after falling out of bed, with c/o  bilateral leg pain, weakness, paresthesias. Noted to have transaminitis, hypokal, hypomag, alcoholic/starvation ketosis    On exam, pt has hyperreflexia, hyertonia lower extremities and proximal LE weakness.    MRI head is unremarkable  Repeat MRI cervical spine and image T spine to r/o demyelinating disorder, myelitis is still pending.  Vitamin B12 is 348 which is on the lower end of normal. Will supplement B12 1000 mcg/day  Consider checking copper levels    ETOH withdrawal protocol as per CCU    Neurology will follow-up as needed.

## 2022-07-28 NOTE — DIETITIAN INITIAL EVALUATION ADULT - ADD RECOMMEND
1) continue with regular diet and encourage protein enriched foods with all meals 2) Monitor BM if none x > 3 days initiate bowel meds 3) Maintain aspiration precautions, back of bed >35 degrees. 4) Monitor lytes and hydration replete prn 5) MVI w/ minerals daily to meet RDI's and increase Thiamine 200mg daily (most likely depleted) 6) Obtain vitamin D 25OH, B1, Folic, Fe, levels to assess nutriture. 7) Monitor weights and track trends 8) Ensure enlive 8oz po TID (in between meals) and Gelatein plus jello po TID (additional 60 gm protein)

## 2022-07-28 NOTE — DIETITIAN NUTRITION RISK NOTIFICATION - ADDITIONAL COMMENTS/DIETITIAN RECOMMENDATIONS
Additional Recommendations  1) continue with regular diet and encourage protein enriched foods with all meals 2) Monitor BM if none x > 3 days initiate bowel meds 3) Maintain aspiration precautions, back of bed >35 degrees. 4) Monitor lytes and hydration replete prn 5) MVI w/ minerals daily to meet RDI's and increase Thiamine 200mg daily (most likely depleted) 6) Obtain vitamin D 25OH, B1, Folic, Fe, levels to assess nutriture. 7) Monitor weights and track trends 8) Ensure enlive 8oz po TID (in between meals) and Gelatein plus jello po TID (additional 60 gm protein)

## 2022-07-28 NOTE — PROGRESS NOTE ADULT - SUBJECTIVE AND OBJECTIVE BOX
CC: Fall  History of Present Illness:   33 y/o F with no known PMHx was BIBA from home after reportedly falling out of bed today. Of note the patient notes that since receiving her COVID-19 vaccination on 5/1/2021 she has had increased symptoms of bilateral leg pain, and paresthesias leading to increased difficulty with ambulation. Additionally the patient states her symptoms have worsened over the past few weeks and reports at times "her legs were not moving." Upon the patient's fall out of bed the patient denied any associated head trauma, or prodrome of nausea, vomiting, or subjective fevers. Of note the patient admits to drinking multiple glasses of wine daily for months as well, was last drink last night. The patient states that she is being followed by her PCP for her tremors and lower extremity symptoms with no remarkable work up thus far.   Vital signs in triage => /59, /min, RR 22/min, Tmax 98.5'F SpO2 100% on room air. Labs => MCV/.9/37.5, , Na 133, Glu 126, TBili 2.1, AST//127, Mg 1.0, TSH 4.71, UA (+) with Ketones. CT Head => No acute intracranial hemorrhage, hydrocephalus or extra-axial fluid collection. No CT evidence for acute transcortical infarction. Bilateral Lower Extremity Venous Duplex => No evidence of deep venous thrombosis in either lower extremity. In the ED the patient received Ceftriaxone 1g IVP x 1, Ketorolac 15mg IVP x 1, Lorazepam 2mg IVP x 2, Magnesium sulfate 2g IVPB x 1, and NS x 2L.    Vital Signs Last 24 Hrs  T(C): 36.9 (28 Jul 2022 05:05), Max: 37.3 (28 Jul 2022 00:00)  T(F): 98.4 (28 Jul 2022 05:05), Max: 99.2 (28 Jul 2022 00:00)  HR: 85 (28 Jul 2022 06:00) (85 - 106)  BP: 134/95 (28 Jul 2022 06:00) (125/86 - 138/73)  BP(mean): 104 (28 Jul 2022 06:00) (93 - 104)  RR: --  SpO2: 99% (27 Jul 2022 22:00) (99% - 99%)    Parameters below as of 27 Jul 2022 22:00  Patient On (Oxygen Delivery Method): room air        Parameters below as of 24 Jul 2022 07:52  Patient On (Oxygen Delivery Method): room air      Physical Exam:  · Constitutional	no distress  · Eyes	PERRL; EOMI  · Respiratory	clear to auscultation bilaterally; no wheezes; no rales  · Cardiovascular	regular rate and rhythm; S1 S2 present  · Gastrointestinal	soft; nontender; nondistended  · Neurological	sensation intact; superficial reflexes intact; bilateral upper extremity tremors and tongue fasciculations  · Mental Status	AAOx3  , b/l leg weakness, minimal strength against gravity b/l legs, sensation intact, patellar reflex intact b/l  · Motor	Bilateral LE weakness 4/5  · Skin	warm and dry; color normal  · Musculoskeletal	no joint erythema; no joint warmth; decreased strength; abnormal gait  · Psychiatric	anxious                              10.4   4.27  )-----------( 113      ( 27 Jul 2022 05:18 )             30.0   07-27    138  |  109<H>  |  5<L>  ----------------------------<  83  4.0   |  23  |  0.44<L>    Ca    9.0      27 Jul 2022 05:18  Phos  4.6     07-27  Mg     2.2     07-27                Assessment/Pl32 y/o F with no known PMHx was BIBA from home after reportedly falling out of bed today. Of note the patient notes that since receiving her COVID-19 vaccination on 5/1/2021 she has had increased symptoms of bilateral leg pain, and paresthesias leading to increased difficulty with ambulation.      #Alcohol withdrawal syndrome  -monitor on Telemetry  ~cont. CIWA per protocol  ~cont. Thiamine replacement  ~cont. Folic acid 1mg po daily  ~cont. MVI 1tab po daily  ~fall precautions    #Bilateral Leg Weakness: DDx include Vitamin Deficiencies (Thiamine; early Wernicke's? patient noted to be severely hypomagnesemic), vs GBS (although not acute and not ascending in nature) vs Toxins (Alcohol abuse; lower alcoholic myopathy with selected atrophy of Type II muscle fibers) vs demyelinating disease  ~f/u w/ Neurology appreciated, MRIs noted   ~fall precautions    Consult Psychiatry r/o depression; CSW to [facility]asc[facility] for Rehab

## 2022-07-28 NOTE — DIETITIAN INITIAL EVALUATION ADULT - OTHER INFO
33 y/o F with no known PMHx was BIBA from home after reportedly falling out of bed today. Of note the patient notes that since receiving her COVID-19 vaccination on 5/1/2021 she has had increased symptoms of bilateral leg pain, and paresthesias leading to increased difficulty with ambulation. Additionally the patient states her symptoms have worsened over the past few weeks and reports at times "her legs were not moving." Upon the patient's fall out of bed the patient denied any associated head trauma, or prodrome of nausea, vomiting, or subjective fevers. Of note the patient admits to drinking multiple glasses of wine daily for months as well, was last drink last night. The patient states that she is being followed by her PCP for her tremors and lower extremity symptoms with no remarkable work up thus far. Diagnosis: #Alcohol withdrawal syndrome, Bilateral Leg Weakness: DDx include Vitamin Deficiencies (Thiamine; early Wernicke's? patient noted to be severely hypomagnesemic). Mentation remains confused and lethargic unable to consume adequate nutrition (meeting > 80% of ENN) since admission x 5 days. NFPE- moderate muscle/fat wasting. Thiamine ordered 100mg suggest increasing to 200mg 2/2 CIWA. Since admission significant weight loss noted of 2% (expected 2/2 poor intake). Will continue to monitor and follow up prn. See below for recommendations. Criteria met for severe malnutrition.

## 2022-07-28 NOTE — PROGRESS NOTE ADULT - SUBJECTIVE AND OBJECTIVE BOX
Interval History:  7/28/22: Reports leg pain    MEDICATIONS  (STANDING):  cyanocobalamin 1000 MICROGram(s) Oral daily  enoxaparin Injectable 40 milliGRAM(s) SubCutaneous every 24 hours  escitalopram 5 milliGRAM(s) Oral daily  folic acid 1 milliGRAM(s) Oral daily  gabapentin 300 milliGRAM(s) Oral three times a day  LORazepam     Tablet 2 milliGRAM(s) Oral every 6 hours  multivitamin 1 Tablet(s) Oral daily  pantoprazole    Tablet 40 milliGRAM(s) Oral before breakfast  thiamine IVPB 500 milliGRAM(s) IV Intermittent daily    MEDICATIONS  (PRN):  acetaminophen     Tablet .. 650 milliGRAM(s) Oral every 6 hours PRN Mild Pain (1 - 3)      Allergies    No Known Allergies    Intolerances        PHYSICAL EXAM:  Vital Signs Last 24 Hrs  T(F): 98.4 (07-28-22 @ 05:05)  HR: 85 (07-28-22 @ 06:00)  BP: 134/95 (07-28-22 @ 06:00)  RR: --    GENERAL: NAD, well-groomed, well-developed  HEAD:  Atraumatic, Normocephalic  Neuro:  HF: Awake and alert.  Flat affect, no aphasia or dysarthria, follows commands   CN: PERLL, EOMI, V1-3 sensation intact, face symmetric, hearing intact, tongue midline, SCM equal bilaterally  Motor: normal bulk, Limited elevation of Lower extremities due to pain, proximal LE 4+/5, distal 5/5   Sens: Reports decreased sensation in feet, joint position sense intact  Reflexes: 2+ in biceps, radialis, patellars,  downgoing toes b/l, Lara's negative bilaterally  Coord:  No gross dysmetria,  Gait: not tested        LABS:                        10.4   4.27  )-----------( 113      ( 27 Jul 2022 05:18 )             30.0     07-27    138  |  109<H>  |  5<L>  ----------------------------<  83  4.0   |  23  |  0.44<L>    Ca    9.0      27 Jul 2022 05:18  Phos  4.6     07-27  Mg     2.2     07-27            RADIOLOGY & ADDITIONAL STUDIES:  MRI head and cervical spine 7/24/22:  MR brain:   Unremarkable MR of the brain.      MR cervical spine: Limited examination. Minimal disc degeneration and   spondylosis at C4-5 and C5-6.

## 2022-07-28 NOTE — DIETITIAN INITIAL EVALUATION ADULT - PERTINENT MEDS FT
MEDICATIONS  (STANDING):  cyanocobalamin 1000 MICROGram(s) Oral daily  enoxaparin Injectable 40 milliGRAM(s) SubCutaneous every 24 hours  escitalopram 5 milliGRAM(s) Oral daily  folic acid 1 milliGRAM(s) Oral daily  gabapentin 300 milliGRAM(s) Oral three times a day  LORazepam     Tablet 2 milliGRAM(s) Oral every 6 hours  multivitamin 1 Tablet(s) Oral daily  pantoprazole    Tablet 40 milliGRAM(s) Oral before breakfast  thiamine 100 milliGRAM(s) Oral daily    MEDICATIONS  (PRN):  acetaminophen     Tablet .. 650 milliGRAM(s) Oral every 6 hours PRN Mild Pain (1 - 3)

## 2022-07-29 ENCOUNTER — TRANSCRIPTION ENCOUNTER (OUTPATIENT)
Age: 32
End: 2022-07-29

## 2022-07-29 VITALS — SYSTOLIC BLOOD PRESSURE: 128 MMHG | HEART RATE: 78 BPM | DIASTOLIC BLOOD PRESSURE: 87 MMHG

## 2022-07-29 LAB
AUTO DIFF PNL BLD: NEGATIVE — SIGNIFICANT CHANGE UP
C-ANCA SER-ACNC: NEGATIVE — SIGNIFICANT CHANGE UP
P-ANCA SER-ACNC: NEGATIVE — SIGNIFICANT CHANGE UP

## 2022-07-29 RX ORDER — ESCITALOPRAM OXALATE 10 MG/1
1 TABLET, FILM COATED ORAL
Qty: 30 | Refills: 0
Start: 2022-07-29 | End: 2022-08-27

## 2022-07-29 RX ORDER — THIAMINE MONONITRATE (VIT B1) 100 MG
1 TABLET ORAL
Qty: 30 | Refills: 0
Start: 2022-07-29 | End: 2022-08-27

## 2022-07-29 RX ORDER — GABAPENTIN 400 MG/1
1 CAPSULE ORAL
Qty: 90 | Refills: 0
Start: 2022-07-29 | End: 2022-08-27

## 2022-07-29 RX ORDER — FOLIC ACID 0.8 MG
1 TABLET ORAL
Qty: 30 | Refills: 0
Start: 2022-07-29 | End: 2022-08-27

## 2022-07-29 RX ADMIN — GABAPENTIN 300 MILLIGRAM(S): 400 CAPSULE ORAL at 06:48

## 2022-07-29 RX ADMIN — ESCITALOPRAM OXALATE 5 MILLIGRAM(S): 10 TABLET, FILM COATED ORAL at 10:52

## 2022-07-29 RX ADMIN — Medication 100 MILLIGRAM(S): at 10:52

## 2022-07-29 RX ADMIN — Medication 1 MILLIGRAM(S): at 10:53

## 2022-07-29 RX ADMIN — Medication 2 MILLIGRAM(S): at 00:04

## 2022-07-29 RX ADMIN — GABAPENTIN 300 MILLIGRAM(S): 400 CAPSULE ORAL at 14:26

## 2022-07-29 RX ADMIN — Medication 1 TABLET(S): at 10:52

## 2022-07-29 RX ADMIN — Medication 2 MILLIGRAM(S): at 06:48

## 2022-07-29 RX ADMIN — PREGABALIN 1000 MICROGRAM(S): 225 CAPSULE ORAL at 10:52

## 2022-07-29 RX ADMIN — PANTOPRAZOLE SODIUM 40 MILLIGRAM(S): 20 TABLET, DELAYED RELEASE ORAL at 06:48

## 2022-07-29 NOTE — OCCUPATIONAL THERAPY INITIAL EVALUATION ADULT - ADDITIONAL COMMENTS
Patient reports she [place] with 6 CASTRO + b/l HR (can only reach one at a time), no steps inside. Patient reports having both a tub and a shower with no DME inside. Patient reports using a RW at home and a regular standard toilet. Patient is RHD and is not currently working.

## 2022-07-29 NOTE — DISCHARGE NOTE PROVIDER - HOSPITAL COURSE
31 y/o F with no known PMHx was BIBA from home after reportedly falling out of bed today. Of note the patient notes that since receiving her COVID-19 vaccination on 5/1/2021 she has had increased symptoms of bilateral leg pain, and paresthesias leading to increased difficulty with ambulation. Additionally the patient states her symptoms have worsened over the past few weeks and reports at times "her legs were not moving." Upon the patient's fall out of bed the patient denied any associated head trauma, or prodrome of nausea, vomiting, or subjective fevers. Of note the patient admits to drinking multiple glasses of wine daily for months as well, was last drink last night. The patient states that she is being followed by her PCP for her tremors and lower extremity symptoms with no remarkable work up thus far.   Vital signs in triage => /59, /min, RR 22/min, Tmax 98.5'F SpO2 100% on room air. Labs => MCV/.9/37.5, , Na 133, Glu 126, TBili 2.1, AST//127, Mg 1.0, TSH 4.71, UA (+) with Ketones. CT Head => No acute intracranial hemorrhage, hydrocephalus or extra-axial fluid collection. No CT evidence for acute transcortical infarction. Bilateral Lower Extremity Venous Duplex => No evidence of deep venous thrombosis in either lower extremity. In the ED the patient received Ceftriaxone 1g IVP x 1, Ketorolac 15mg IVP x 1, Lorazepam 2mg IVP x 2, Magnesium sulfate 2g IVPB x 1, and NS x 2L.  Pt was transf to ICU for Precedex drip now downgraded; continues with paresthesias and requires walker to ambulate       Alcohol withdrawal syndrome  resolving    #Bilateral Leg Weakness: DDx include Vitamin Deficiencies (Thiamine; early Wernicke's? patient noted to be severely hypomagnesemic), vs GBS (although not acute and not ascending in nature) vs Toxins (Alcohol abuse; lower alcoholic myopathy with selected atrophy of Type II muscle fibers) vs demyelinating disease  ~f/u w/ Neurology appreciated, MRIs noted   ~fall precautions  Copper level sent  this is a chronic issue going on for  year; case d/w Dr Juares, cleared for dc and will f/up with him as an outpt

## 2022-07-29 NOTE — PROGRESS NOTE ADULT - REASON FOR ADMISSION
s/p fall out of bed today
etoh
s/p fall out of bed today

## 2022-07-29 NOTE — PROGRESS NOTE ADULT - PROVIDER SPECIALTY LIST ADULT
Hospitalist
Neurology
Critical Care
Hospitalist
Neurology
Critical Care
Hospitalist
Neurology

## 2022-07-29 NOTE — DISCHARGE NOTE PROVIDER - NSDCMRMEDTOKEN_GEN_ALL_CORE_FT
folic acid 1 mg oral tablet: 1 tab(s) orally once a day  gabapentin 300 mg oral capsule: 1 cap(s) orally 3 times a day  Lexapro 5 mg oral tablet: 1 tab(s) orally once a day  LORazepam 1 mg oral tablet: 1 tab(s) orally once a day prn and as needed MDD:1tb  Vitamin B1 100 mg oral tablet: 1 tab(s) orally once a day

## 2022-07-29 NOTE — OCCUPATIONAL THERAPY INITIAL EVALUATION ADULT - PERTINENT HX OF CURRENT PROBLEM, REHAB EVAL
Patient presented to ED s/p fall OOB at home with bilateral LE pain, paresthesia, weakness, and difficulty walking

## 2022-07-29 NOTE — DISCHARGE NOTE NURSING/CASE MANAGEMENT/SOCIAL WORK - PATIENT PORTAL LINK FT
You can access the FollowMyHealth Patient Portal offered by [facility] by registering at the following website: [email or web address]. By joining [email or web address]’s FollowMyHealth portal, you will also be able to view your health information using other applications (apps) compatible with our system.

## 2022-07-29 NOTE — DISCHARGE NOTE PROVIDER - NSDCCPCAREPLAN_GEN_ALL_CORE_FT
PRINCIPAL DISCHARGE DIAGNOSIS  Diagnosis: Alcohol withdrawal syndrome  Assessment and Plan of Treatment: seek help; f/up with Dr Juares Neurology for the weakness      SECONDARY DISCHARGE DIAGNOSES  Diagnosis: Hypomagnesemia  Assessment and Plan of Treatment:     Diagnosis: Thrombocytopenia  Assessment and Plan of Treatment:     Diagnosis: Bilateral leg weakness  Assessment and Plan of Treatment:

## 2022-07-29 NOTE — DISCHARGE NOTE NURSING/CASE MANAGEMENT/SOCIAL WORK - NSDCPEFALRISK_GEN_ALL_CORE
For information on Fall & Injury Prevention, visit: [email or web address] OR  [email or web address] OR  https://www.cdc.gov/steadi/patient.html

## 2022-07-29 NOTE — PROGRESS NOTE ADULT - NUTRITIONAL ASSESSMENT
This patient has been assessed with a concern for Malnutrition and has been determined to have a diagnosis/diagnoses of Severe protein-calorie malnutrition.    This patient is being managed with:   Diet Regular-  Entered: Jul 28 2022 10:23AM

## 2022-07-29 NOTE — DISCHARGE NOTE PROVIDER - DETAILS OF MALNUTRITION DIAGNOSIS/DIAGNOSES
This patient has been assessed with a concern for Malnutrition and was treated during this hospitalization for the following Nutrition diagnosis/diagnoses:     -  07/28/2022: Severe protein-calorie malnutrition

## 2022-07-29 NOTE — SBIRT NOTE ADULT - NSSBIRTBRIEFINTDET_GEN_A_CORE
SW reviewed guidelines for responsible alcohol use; pt reports during this hospital admission, her issue is with depression, not alcohol. She recognizes the role alcohol played and wishes to focus on her mental health; state of depression since loss of employment shortly after pandemic started.  Pt was provided with list of private therapists on par with her insurance

## 2022-07-29 NOTE — PROGRESS NOTE ADULT - ASSESSMENT
33 y/o woman BIBA from home after falling out of bed, with c/o  bilateral leg pain, weakness, paresthesias. On exam, pt has hyperreflexia, hyertonia lower extremities and proximal LE weakness. MRI head is unremarkable, Repeat MRI cervical spine and image T spine to r/o demyelinating disorder, showed no abnormalities in the cord.  Suggest:  EMG/NCV as outpatient  f/u with my office when d/c

## 2022-07-29 NOTE — PROGRESS NOTE ADULT - SUBJECTIVE AND OBJECTIVE BOX
CC: Fall  History of Present Illness:   31 y/o F with no known PMHx was BIBA from home after reportedly falling out of bed today. Of note the patient notes that since receiving her COVID-19 vaccination on 5/1/2021 she has had increased symptoms of bilateral leg pain, and paresthesias leading to increased difficulty with ambulation. Additionally the patient states her symptoms have worsened over the past few weeks and reports at times "her legs were not moving." Upon the patient's fall out of bed the patient denied any associated head trauma, or prodrome of nausea, vomiting, or subjective fevers. Of note the patient admits to drinking multiple glasses of wine daily for months as well, was last drink last night. The patient states that she is being followed by her PCP for her tremors and lower extremity symptoms with no remarkable work up thus far.   Vital signs in triage => /59, /min, RR 22/min, Tmax 98.5'F SpO2 100% on room air. Labs => MCV/.9/37.5, , Na 133, Glu 126, TBili 2.1, AST//127, Mg 1.0, TSH 4.71, UA (+) with Ketones. CT Head => No acute intracranial hemorrhage, hydrocephalus or extra-axial fluid collection. No CT evidence for acute transcortical infarction. Bilateral Lower Extremity Venous Duplex => No evidence of deep venous thrombosis in either lower extremity. In the ED the patient received Ceftriaxone 1g IVP x 1, Ketorolac 15mg IVP x 1, Lorazepam 2mg IVP x 2, Magnesium sulfate 2g IVPB x 1, and NS x 2L.  Pt was transf to ICU for Precedex drip now downgraded; continues with paresthesias and requires walker to ambulate       Vital Signs Last 24 Hrs  T(C): 36.9 (28 Jul 2022 05:05), Max: 37.3 (28 Jul 2022 00:00)  T(F): 98.4 (28 Jul 2022 05:05), Max: 99.2 (28 Jul 2022 00:00)  HR: 85 (28 Jul 2022 06:00) (85 - 106)  BP: 134/95 (28 Jul 2022 06:00) (125/86 - 138/73)  BP(mean): 104 (28 Jul 2022 06:00) (93 - 104)  RR: --  SpO2: 99% (27 Jul 2022 22:00) (99% - 99%)    Parameters below as of 27 Jul 2022 22:00  Patient On (Oxygen Delivery Method): room air        Parameters below as of 24 Jul 2022 07:52  Patient On (Oxygen Delivery Method): room air      Physical Exam:  · Constitutional	no distress  · Eyes	PERRL; EOMI  · Respiratory	clear to auscultation bilaterally; no wheezes; no rales  · Cardiovascular	regular rate and rhythm; S1 S2 present  · Gastrointestinal	soft; nontender; nondistended  · Neurological	sensation intact; superficial reflexes intact; bilateral upper extremity tremors and tongue fasciculations  · Mental Status	AAOx3  , b/l leg weakness, minimal strength against gravity b/l legs, sensation intact, patellar reflex intact b/l  · Motor	Bilateral LE weakness 4/5  · Skin	warm and dry; color normal  · Musculoskeletal	no joint erythema; no joint warmth; decreased strength; abnormal gait  · Psychiatric	anxious                              10.4   4.27  )-----------( 113      ( 27 Jul 2022 05:18 )             30.0   07-27    138  |  109<H>  |  5<L>  ----------------------------<  83  4.0   |  23  |  0.44<L>    Ca    9.0      27 Jul 2022 05:18  Phos  4.6     07-27  Mg     2.2     07-27                Assessment/Pl32 y/o F with no known PMHx was BIBA from home after reportedly falling out of bed today. Of note the patient notes that since receiving her COVID-19 vaccination on 5/1/2021 she has had increased symptoms of bilateral leg pain, and paresthesias leading to increased difficulty with ambulation.      #Alcohol withdrawal syndrome  -monitor on Telemetry  ~cont. CIWA per protocol  ~cont. Thiamine replacement  ~cont. Folic acid 1mg po daily  ~cont. MVI 1tab po daily  ~fall precautions    #Bilateral Leg Weakness: DDx include Vitamin Deficiencies (Thiamine; early Wernicke's? patient noted to be severely hypomagnesemic), vs GBS (although not acute and not ascending in nature) vs Toxins (Alcohol abuse; lower alcoholic myopathy with selected atrophy of Type II muscle fibers) vs demyelinating disease  ~f/u w/ Neurology appreciated, MRIs noted   ~fall precautions    Consult Psychiatry r/o depression; CSW to eval for Rehab        CC: Fall  History of Present Illness:   33 y/o F with no known PMHx was BIBA from home after reportedly falling out of bed today. Of note the patient notes that since receiving her COVID-19 vaccination on 5/1/2021 she has had increased symptoms of bilateral leg pain, and paresthesias leading to increased difficulty with ambulation. Additionally the patient states her symptoms have worsened over the past few weeks and reports at times "her legs were not moving." Upon the patient's fall out of bed the patient denied any associated head trauma, or prodrome of nausea, vomiting, or subjective fevers. Of note the patient admits to drinking multiple glasses of wine daily for months as well, was last drink last night. The patient states that she is being followed by her PCP for her tremors and lower extremity symptoms with no remarkable work up thus far.   Vital signs in triage => /59, /min, RR 22/min, Tmax 98.5'F SpO2 100% on room air. Labs => MCV/.9/37.5, , Na 133, Glu 126, TBili 2.1, AST//127, Mg 1.0, TSH 4.71, UA (+) with Ketones. CT Head => No acute intracranial hemorrhage, hydrocephalus or extra-axial fluid collection. No CT evidence for acute transcortical infarction. Bilateral Lower Extremity Venous Duplex => No evidence of deep venous thrombosis in either lower extremity. In the ED the patient received Ceftriaxone 1g IVP x 1, Ketorolac 15mg IVP x 1, Lorazepam 2mg IVP x 2, Magnesium sulfate 2g IVPB x 1, and NS x 2L.  Pt was transf to ICU for Precedex drip now downgraded; continues with paresthesias and requires walker to ambulate       Vital Signs Last 24 Hrs  T(C): 36.9 (29 Jul 2022 06:18), Max: 37 (28 Jul 2022 17:16)  T(F): 98.4 (29 Jul 2022 06:18), Max: 98.6 (28 Jul 2022 17:16)  HR: 78 (29 Jul 2022 09:31) (78 - 85)  BP: 128/87 (29 Jul 2022 09:31) (125/72 - 137/86)  BP(mean): 96 (29 Jul 2022 09:31) (84 - 96)  RR: --  SpO2: 97% (29 Jul 2022 07:05) (97% - 97%)    Parameters below as of 29 Jul 2022 00:06  Patient On (Oxygen Delivery Method): room air          Physical Exam:  · Constitutional	no distress  · Eyes	PERRL; EOMI  · Respiratory	clear to auscultation bilaterally; no wheezes; no rales  · Cardiovascular	regular rate and rhythm; S1 S2 present  · Gastrointestinal	soft; nontender; nondistended  · Neurological	sensation intact; superficial reflexes intact; bilateral upper extremity tremors and tongue fasciculations  · Mental Status	AAOx3  , b/l leg weakness, minimal strength against gravity b/l legs, sensation intact, patellar reflex intact b/l  · Motor	Bilateral LE weakness 4/5  · Skin	warm and dry; color normal  · Musculoskeletal	no joint erythema; no joint warmth; decreased strength; abnormal gait  · Psychiatric	anxious                              10.4   4.27  )-----------( 113      ( 27 Jul 2022 05:18 )             30.0   07-27    138  |  109<H>  |  5<L>  ----------------------------<  83  4.0   |  23  |  0.44<L>    Ca    9.0      27 Jul 2022 05:18  Phos  4.6     07-27  Mg     2.2     07-27                Assessment/Pl32 y/o F with no known PMHx was BIBA from home after reportedly falling out of bed today. Of note the patient notes that since receiving her COVID-19 vaccination on 5/1/2021 she has had increased symptoms of bilateral leg pain, and paresthesias leading to increased difficulty with ambulation.      #Alcohol withdrawal syndrome  resolving    #Bilateral Leg Weakness: DDx include Vitamin Deficiencies (Thiamine; early Wernicke's? patient noted to be severely hypomagnesemic), vs GBS (although not acute and not ascending in nature) vs Toxins (Alcohol abuse; lower alcoholic myopathy with selected atrophy of Type II muscle fibers) vs demyelinating disease  ~f/u w/ Neurology appreciated, MRIs noted   ~fall precautions  Copper level sent

## 2022-07-29 NOTE — PROGRESS NOTE ADULT - SUBJECTIVE AND OBJECTIVE BOX
HPI:  31 y/o F with no known PMHx was BIBA from home after reportedly falling out of bed c/o  symptoms of bilateral leg pain, and paresthesias leading to increased difficulty with ambulation. Hosp course complicated by ETOH withdrawal, transferred to CCU.    MEDICATIONS  (STANDING):  cyanocobalamin 1000 MICROGram(s) Oral daily  enoxaparin Injectable 40 milliGRAM(s) SubCutaneous every 24 hours  escitalopram 5 milliGRAM(s) Oral daily  folic acid 1 milliGRAM(s) Oral daily  gabapentin 300 milliGRAM(s) Oral three times a day  LORazepam     Tablet 2 milliGRAM(s) Oral every 6 hours  multivitamin 1 Tablet(s) Oral daily  pantoprazole    Tablet 40 milliGRAM(s) Oral before breakfast  thiamine 100 milliGRAM(s) Oral daily    MEDICATIONS  (PRN):  acetaminophen     Tablet .. 650 milliGRAM(s) Oral every 6 hours PRN Mild Pain (1 - 3)      Vital Signs Last 24 Hrs  T(C): 36.9 (29 Jul 2022 06:18), Max: 37 (28 Jul 2022 17:16)  T(F): 98.4 (29 Jul 2022 06:18), Max: 98.6 (28 Jul 2022 17:16)  HR: 78 (29 Jul 2022 09:31) (78 - 85)  BP: 128/87 (29 Jul 2022 09:31) (128/83 - 137/86)  BP(mean): 96 (29 Jul 2022 09:31) (93 - 96)  SpO2: 97% (29 Jul 2022 07:05) (97% - 97%)    Parameters below as of 29 Jul 2022 00:06  Patient On (Oxygen Delivery Method): room air      Neurological Exam:  HF: Patient is alert and oriented x 3. There is no aphasia or dysarthria. Follows commands.   CN: Vision is intact to confrontation. Pupils are equal and reactive. Extra ocular muscles are intact. There is no facial droop or asymmetry. Tongue is midline. Sensation is intact in the face. Other CN II-XII are intact.   Motor: motor examination all muscles are -5/5 and there is no pronator drift.   Sensory: intact to  touch.  DTR: 2-3/4 all 4 extremities. Babinski is negative bilateral.  Co-ord:  Finger to finger to nose is intact. Heel to shin is intact bilaterally.     Thyroid Stimulating Hormone, Serum: 3.93 uU/mL (07.25.22 @ 06:48)   Double Stranded DNA Antibody: <12: Method: EIA   Creatine Kinase, Serum: 102 U/L (07.24.22 @ 11:54  )Lyme C6 Interpretation: Negative:  Anti SS-A Antibody: <0.2 AI (07.24.22 @ 11:54)   Anti SS-B Antibody: <0.2:  Treponema Pallidum Antibody Interpretation: Negative:   Serum Protein Electrophoresis Interp: Normal Electrophoresis Pattern   Rheumatoid Factor Quant, Serum or Plasma: <10: Test Repeated IU/mL (07.24.22 @ 11:54)   Anti Nuclear Factor Titer: Negative: Antinuclear AB (REBECCA), IFA Method (07.24.22 @ 11:54)   Vitamin B12, Serum: 348 pg/mL (07.24.22 @ 04:17)   Folate, Serum: 2.3 ng/mL (07.24.22 @ 04:17)       < from: MR Thoracic Spine No Cont (07.28.22 @ 14:55) >    IMPRESSION:    No evidence of abnormal signal within the cervical or thoracic cord to   suggest demyelination or myelopathy.    Mild degenerative disc changes at C4/C5 and C5/C6 as above resulting in   mild narrowing of the right neural foramen at C5/C6. No significant   spinal canal stenosis.    At T10/T11, there is a small degree of bilateral facet arthropathy mildly   effacing posterior thecal sac without significant spinal canal stenosis   or abnormal signal within the cord.    < end of copied text >  < from: MR Head No Cont (07.24.22 @ 15:31) >    IMPRESSION:    MR brain:   Unremarkable MR of the brain.      MR cervical spine: Limited examination. Minimal disc degeneration and   spondylosis at C4-5 and C5-6.    < end of copied text >

## 2022-07-30 LAB — COPPER SERPL-MCNC: 144 UG/DL — SIGNIFICANT CHANGE UP (ref 80–158)

## 2022-08-02 NOTE — CDI QUERY NOTE - NSCDIOTHERTXTBX_GEN_ALL_CORE_HH
Please include more specific documentation in your progress note and discharge summary.  The documentation in this medical record requires additional clarification to accurately capture the patient’s diagnosis/diagnoses, treatment and or severity of illness. Please document all corresponding diagnoses, either known or suspected, of the clinical indicators described below.    Link Signs and Symptoms to underlying cause if known (e.g. chest pain (likely) secondary to ___________).    Present on Admission:  Was the severity of the condition present on admission? If so, please document in the chart that “(the condition) was present on admission.”    Please clarify the type of encephalopathy that was being treated during the admission.    A) Metabolic encephalopathy due to electrolyte imbalance  B) Toxic encephalopathy due to alcohol  C) Wernicke's encephalopathy.  D) Unable to determine  E) Other ( Please specify)       CLINICAL INDICATORS:    Nursing profile - confused - disoriented to place, situation and time - illogical - restless - anxious    Critical care on 7/275/2022:    + anxious, resting tremor; exhibits paranoia  Alert, oriented to self and place    Critical care documentation on 7/26/2022:  + FHX (mother) MS. Pt carries Dx of fibromyalgia; continue gabapentin 300mg PO TID. MRI B, cspine without acute pathology. Remainder of neuropathy w/u per Neuro. High dose thiamine 500mg IV qd x 3 days for empiric Tx Wernicke's encephalopathy.  7/26 AM: Some improvement in confusion and tachycardia.    Critical care documentation on 7/27/2022:   In ED  TBili 2.1, AST//127, Mg 1.0, TSH 4.71, UA (+) with Ketones. CT Head => Negative   Critical Care was consulted on 7/25 for confusion ,tremors  Had MRI brain, Neck neck for pathology  had negative venous doppers  7/27 AM: improvement in confusion     Discharge summary documentation:  #Alcohol withdrawal syndrome  resolving    #Bilateral Leg Weakness: DDx include Vitamin Deficiencies (Thiamine; early Wernicke's? patient noted to be severely hypomagnesemic), vs GBS (although not acute and not ascending in nature) vs Toxins (Alcohol abuse; lower alcoholic myopathy with selected atrophy of Type II muscle fibers) vs demyelinating disease  ~f/u w/ Neurology appreciated, MRIs noted   ~fall precautions  Copper level sent

## 2023-01-03 ENCOUNTER — APPOINTMENT (OUTPATIENT)
Dept: NEUROLOGY | Facility: CLINIC | Age: 33
End: 2023-01-03

## 2023-04-27 ENCOUNTER — APPOINTMENT (OUTPATIENT)
Dept: INTERNAL MEDICINE | Facility: CLINIC | Age: 33
End: 2023-04-27

## 2023-10-26 ENCOUNTER — NON-APPOINTMENT (OUTPATIENT)
Age: 33
End: 2023-10-26

## 2023-10-27 ENCOUNTER — INPATIENT (INPATIENT)
Facility: HOSPITAL | Age: 33
LOS: 4 days | Discharge: HOME | DRG: 143 | End: 2023-11-01
Attending: FAMILY MEDICINE | Admitting: INTERNAL MEDICINE
Payer: MEDICAID

## 2023-10-27 VITALS
OXYGEN SATURATION: 100 % | DIASTOLIC BLOOD PRESSURE: 59 MMHG | HEART RATE: 147 BPM | WEIGHT: 136.91 LBS | RESPIRATION RATE: 20 BRPM | TEMPERATURE: 99 F | SYSTOLIC BLOOD PRESSURE: 105 MMHG

## 2023-10-27 DIAGNOSIS — J98.2 INTERSTITIAL EMPHYSEMA: ICD-10-CM

## 2023-10-27 LAB
ACETONE SERPL-MCNC: ABNORMAL
ACETONE SERPL-MCNC: ABNORMAL
ADD ON TEST-SPECIMEN IN LAB: SIGNIFICANT CHANGE UP
ADD ON TEST-SPECIMEN IN LAB: SIGNIFICANT CHANGE UP
ALBUMIN SERPL ELPH-MCNC: 3.6 G/DL — SIGNIFICANT CHANGE UP (ref 3.3–5)
ALBUMIN SERPL ELPH-MCNC: 3.6 G/DL — SIGNIFICANT CHANGE UP (ref 3.3–5)
ALBUMIN SERPL ELPH-MCNC: 3.9 G/DL — SIGNIFICANT CHANGE UP (ref 3.3–5)
ALBUMIN SERPL ELPH-MCNC: 3.9 G/DL — SIGNIFICANT CHANGE UP (ref 3.3–5)
ALBUMIN SERPL ELPH-MCNC: 4.4 G/DL — SIGNIFICANT CHANGE UP (ref 3.3–5)
ALBUMIN SERPL ELPH-MCNC: 4.4 G/DL — SIGNIFICANT CHANGE UP (ref 3.3–5)
ALP SERPL-CCNC: 47 U/L — SIGNIFICANT CHANGE UP (ref 40–120)
ALP SERPL-CCNC: 47 U/L — SIGNIFICANT CHANGE UP (ref 40–120)
ALP SERPL-CCNC: 53 U/L — SIGNIFICANT CHANGE UP (ref 40–120)
ALP SERPL-CCNC: 53 U/L — SIGNIFICANT CHANGE UP (ref 40–120)
ALP SERPL-CCNC: 64 U/L — SIGNIFICANT CHANGE UP (ref 40–120)
ALP SERPL-CCNC: 64 U/L — SIGNIFICANT CHANGE UP (ref 40–120)
ALT FLD-CCNC: 22 U/L — SIGNIFICANT CHANGE UP (ref 12–78)
ALT FLD-CCNC: 22 U/L — SIGNIFICANT CHANGE UP (ref 12–78)
ALT FLD-CCNC: 23 U/L — SIGNIFICANT CHANGE UP (ref 12–78)
ALT FLD-CCNC: 23 U/L — SIGNIFICANT CHANGE UP (ref 12–78)
ALT FLD-CCNC: 32 U/L — SIGNIFICANT CHANGE UP (ref 12–78)
ALT FLD-CCNC: 32 U/L — SIGNIFICANT CHANGE UP (ref 12–78)
AMPHET UR-MCNC: NEGATIVE — SIGNIFICANT CHANGE UP
AMPHET UR-MCNC: NEGATIVE — SIGNIFICANT CHANGE UP
ANION GAP SERPL CALC-SCNC: 13 MMOL/L — SIGNIFICANT CHANGE UP (ref 5–17)
ANION GAP SERPL CALC-SCNC: 13 MMOL/L — SIGNIFICANT CHANGE UP (ref 5–17)
ANION GAP SERPL CALC-SCNC: 16 MMOL/L — SIGNIFICANT CHANGE UP (ref 5–17)
ANION GAP SERPL CALC-SCNC: 16 MMOL/L — SIGNIFICANT CHANGE UP (ref 5–17)
ANION GAP SERPL CALC-SCNC: 23 MMOL/L — HIGH (ref 5–17)
ANION GAP SERPL CALC-SCNC: 23 MMOL/L — HIGH (ref 5–17)
APAP SERPL-MCNC: < 2 UG/ML (ref 10–30)
APAP SERPL-MCNC: < 2 UG/ML (ref 10–30)
APPEARANCE UR: CLEAR — SIGNIFICANT CHANGE UP
APPEARANCE UR: CLEAR — SIGNIFICANT CHANGE UP
APTT BLD: 26.6 SEC — SIGNIFICANT CHANGE UP (ref 24.5–35.6)
APTT BLD: 26.6 SEC — SIGNIFICANT CHANGE UP (ref 24.5–35.6)
AST SERPL-CCNC: 23 U/L — SIGNIFICANT CHANGE UP (ref 15–37)
AST SERPL-CCNC: 27 U/L — SIGNIFICANT CHANGE UP (ref 15–37)
AST SERPL-CCNC: 27 U/L — SIGNIFICANT CHANGE UP (ref 15–37)
BACTERIA # UR AUTO: NEGATIVE /HPF — SIGNIFICANT CHANGE UP
BACTERIA # UR AUTO: NEGATIVE /HPF — SIGNIFICANT CHANGE UP
BARBITURATES UR SCN-MCNC: NEGATIVE — SIGNIFICANT CHANGE UP
BARBITURATES UR SCN-MCNC: NEGATIVE — SIGNIFICANT CHANGE UP
BASE EXCESS BLDA CALC-SCNC: -11 MMOL/L — LOW (ref -2–3)
BASE EXCESS BLDA CALC-SCNC: -11 MMOL/L — LOW (ref -2–3)
BASE EXCESS BLDA CALC-SCNC: -15.2 MMOL/L — LOW (ref -2–3)
BASE EXCESS BLDA CALC-SCNC: -15.2 MMOL/L — LOW (ref -2–3)
BASE EXCESS BLDV CALC-SCNC: -14.3 MMOL/L — LOW (ref -2–3)
BASE EXCESS BLDV CALC-SCNC: -14.3 MMOL/L — LOW (ref -2–3)
BASOPHILS # BLD AUTO: 0 K/UL — SIGNIFICANT CHANGE UP (ref 0–0.2)
BASOPHILS # BLD AUTO: 0 K/UL — SIGNIFICANT CHANGE UP (ref 0–0.2)
BASOPHILS NFR BLD AUTO: 0 % — SIGNIFICANT CHANGE UP (ref 0–2)
BASOPHILS NFR BLD AUTO: 0 % — SIGNIFICANT CHANGE UP (ref 0–2)
BENZODIAZ UR-MCNC: NEGATIVE — SIGNIFICANT CHANGE UP
BENZODIAZ UR-MCNC: NEGATIVE — SIGNIFICANT CHANGE UP
BILIRUB SERPL-MCNC: 0.9 MG/DL — SIGNIFICANT CHANGE UP (ref 0.2–1.2)
BILIRUB SERPL-MCNC: 0.9 MG/DL — SIGNIFICANT CHANGE UP (ref 0.2–1.2)
BILIRUB SERPL-MCNC: 1 MG/DL — SIGNIFICANT CHANGE UP (ref 0.2–1.2)
BILIRUB UR-MCNC: NEGATIVE — SIGNIFICANT CHANGE UP
BILIRUB UR-MCNC: NEGATIVE — SIGNIFICANT CHANGE UP
BLOOD GAS COMMENTS ARTERIAL: SIGNIFICANT CHANGE UP
BUN SERPL-MCNC: 10 MG/DL — SIGNIFICANT CHANGE UP (ref 7–23)
BUN SERPL-MCNC: 10 MG/DL — SIGNIFICANT CHANGE UP (ref 7–23)
BUN SERPL-MCNC: 16 MG/DL — SIGNIFICANT CHANGE UP (ref 7–23)
BUN SERPL-MCNC: 16 MG/DL — SIGNIFICANT CHANGE UP (ref 7–23)
BUN SERPL-MCNC: 23 MG/DL — SIGNIFICANT CHANGE UP (ref 7–23)
BUN SERPL-MCNC: 23 MG/DL — SIGNIFICANT CHANGE UP (ref 7–23)
CALCIUM SERPL-MCNC: 7.8 MG/DL — LOW (ref 8.5–10.1)
CALCIUM SERPL-MCNC: 7.8 MG/DL — LOW (ref 8.5–10.1)
CALCIUM SERPL-MCNC: 8.1 MG/DL — LOW (ref 8.5–10.1)
CALCIUM SERPL-MCNC: 8.1 MG/DL — LOW (ref 8.5–10.1)
CALCIUM SERPL-MCNC: 8.5 MG/DL — SIGNIFICANT CHANGE UP (ref 8.5–10.1)
CALCIUM SERPL-MCNC: 8.5 MG/DL — SIGNIFICANT CHANGE UP (ref 8.5–10.1)
CAST: 13 /LPF — HIGH (ref 0–4)
CAST: 13 /LPF — HIGH (ref 0–4)
CHLORIDE SERPL-SCNC: 108 MMOL/L — SIGNIFICANT CHANGE UP (ref 96–108)
CHLORIDE SERPL-SCNC: 108 MMOL/L — SIGNIFICANT CHANGE UP (ref 96–108)
CHLORIDE SERPL-SCNC: 110 MMOL/L — HIGH (ref 96–108)
CHLORIDE SERPL-SCNC: 110 MMOL/L — HIGH (ref 96–108)
CHLORIDE SERPL-SCNC: 97 MMOL/L — SIGNIFICANT CHANGE UP (ref 96–108)
CHLORIDE SERPL-SCNC: 97 MMOL/L — SIGNIFICANT CHANGE UP (ref 96–108)
CK SERPL-CCNC: 189 U/L — SIGNIFICANT CHANGE UP (ref 26–192)
CK SERPL-CCNC: 189 U/L — SIGNIFICANT CHANGE UP (ref 26–192)
CO2 SERPL-SCNC: 11 MMOL/L — LOW (ref 22–31)
CO2 SERPL-SCNC: 11 MMOL/L — LOW (ref 22–31)
CO2 SERPL-SCNC: 16 MMOL/L — LOW (ref 22–31)
CO2 SERPL-SCNC: 16 MMOL/L — LOW (ref 22–31)
CO2 SERPL-SCNC: 7 MMOL/L — CRITICAL LOW (ref 22–31)
CO2 SERPL-SCNC: 7 MMOL/L — CRITICAL LOW (ref 22–31)
COCAINE METAB.OTHER UR-MCNC: NEGATIVE — SIGNIFICANT CHANGE UP
COCAINE METAB.OTHER UR-MCNC: NEGATIVE — SIGNIFICANT CHANGE UP
COLOR SPEC: YELLOW — SIGNIFICANT CHANGE UP
COLOR SPEC: YELLOW — SIGNIFICANT CHANGE UP
COMMENT - URINE: SIGNIFICANT CHANGE UP
COMMENT - URINE: SIGNIFICANT CHANGE UP
CREAT SERPL-MCNC: 1.04 MG/DL — SIGNIFICANT CHANGE UP (ref 0.5–1.3)
CREAT SERPL-MCNC: 1.04 MG/DL — SIGNIFICANT CHANGE UP (ref 0.5–1.3)
CREAT SERPL-MCNC: 1.43 MG/DL — HIGH (ref 0.5–1.3)
CREAT SERPL-MCNC: 1.43 MG/DL — HIGH (ref 0.5–1.3)
CREAT SERPL-MCNC: 2.48 MG/DL — HIGH (ref 0.5–1.3)
CREAT SERPL-MCNC: 2.48 MG/DL — HIGH (ref 0.5–1.3)
DIFF PNL FLD: ABNORMAL
DIFF PNL FLD: ABNORMAL
EGFR: 26 ML/MIN/1.73M2 — LOW
EGFR: 26 ML/MIN/1.73M2 — LOW
EGFR: 50 ML/MIN/1.73M2 — LOW
EGFR: 50 ML/MIN/1.73M2 — LOW
EGFR: 73 ML/MIN/1.73M2 — SIGNIFICANT CHANGE UP
EGFR: 73 ML/MIN/1.73M2 — SIGNIFICANT CHANGE UP
EOSINOPHIL # BLD AUTO: 0 K/UL — SIGNIFICANT CHANGE UP (ref 0–0.5)
EOSINOPHIL # BLD AUTO: 0 K/UL — SIGNIFICANT CHANGE UP (ref 0–0.5)
EOSINOPHIL NFR BLD AUTO: 0 % — SIGNIFICANT CHANGE UP (ref 0–6)
EOSINOPHIL NFR BLD AUTO: 0 % — SIGNIFICANT CHANGE UP (ref 0–6)
ETHANOL SERPL-MCNC: <10 MG/DL — SIGNIFICANT CHANGE UP (ref 0–10)
ETHANOL SERPL-MCNC: <10 MG/DL — SIGNIFICANT CHANGE UP (ref 0–10)
GLUCOSE BLDC GLUCOMTR-MCNC: 177 MG/DL — HIGH (ref 70–99)
GLUCOSE BLDC GLUCOMTR-MCNC: 177 MG/DL — HIGH (ref 70–99)
GLUCOSE SERPL-MCNC: 165 MG/DL — HIGH (ref 70–99)
GLUCOSE SERPL-MCNC: 165 MG/DL — HIGH (ref 70–99)
GLUCOSE SERPL-MCNC: 179 MG/DL — HIGH (ref 70–99)
GLUCOSE SERPL-MCNC: 179 MG/DL — HIGH (ref 70–99)
GLUCOSE SERPL-MCNC: 211 MG/DL — HIGH (ref 70–99)
GLUCOSE SERPL-MCNC: 211 MG/DL — HIGH (ref 70–99)
GLUCOSE UR QL: 100 MG/DL
GLUCOSE UR QL: 100 MG/DL
HCG SERPL-ACNC: <1 MIU/ML — SIGNIFICANT CHANGE UP
HCG SERPL-ACNC: <1 MIU/ML — SIGNIFICANT CHANGE UP
HCO3 BLDA-SCNC: 12 MMOL/L — LOW (ref 21–28)
HCO3 BLDA-SCNC: 12 MMOL/L — LOW (ref 21–28)
HCO3 BLDA-SCNC: 8 MMOL/L — CRITICAL LOW (ref 21–28)
HCO3 BLDA-SCNC: 8 MMOL/L — CRITICAL LOW (ref 21–28)
HCO3 BLDV-SCNC: 10 MMOL/L — CRITICAL LOW (ref 22–29)
HCO3 BLDV-SCNC: 10 MMOL/L — CRITICAL LOW (ref 22–29)
HCT VFR BLD CALC: 40.1 % — SIGNIFICANT CHANGE UP (ref 34.5–45)
HCT VFR BLD CALC: 40.1 % — SIGNIFICANT CHANGE UP (ref 34.5–45)
HGB BLD-MCNC: 13.2 G/DL — SIGNIFICANT CHANGE UP (ref 11.5–15.5)
HGB BLD-MCNC: 13.2 G/DL — SIGNIFICANT CHANGE UP (ref 11.5–15.5)
INR BLD: 0.97 RATIO — SIGNIFICANT CHANGE UP (ref 0.85–1.18)
INR BLD: 0.97 RATIO — SIGNIFICANT CHANGE UP (ref 0.85–1.18)
KETONES UR-MCNC: >=160 MG/DL
KETONES UR-MCNC: >=160 MG/DL
LACTATE SERPL-SCNC: 1.4 MMOL/L — SIGNIFICANT CHANGE UP (ref 0.7–2)
LACTATE SERPL-SCNC: 1.4 MMOL/L — SIGNIFICANT CHANGE UP (ref 0.7–2)
LACTATE SERPL-SCNC: 2.1 MMOL/L — HIGH (ref 0.7–2)
LACTATE SERPL-SCNC: 2.1 MMOL/L — HIGH (ref 0.7–2)
LEUKOCYTE ESTERASE UR-ACNC: NEGATIVE — SIGNIFICANT CHANGE UP
LEUKOCYTE ESTERASE UR-ACNC: NEGATIVE — SIGNIFICANT CHANGE UP
LIDOCAIN IGE QN: 128 U/L — HIGH (ref 13–75)
LIDOCAIN IGE QN: 128 U/L — HIGH (ref 13–75)
LYMPHOCYTES # BLD AUTO: 1.22 K/UL — SIGNIFICANT CHANGE UP (ref 1–3.3)
LYMPHOCYTES # BLD AUTO: 1.22 K/UL — SIGNIFICANT CHANGE UP (ref 1–3.3)
LYMPHOCYTES # BLD AUTO: 5 % — LOW (ref 13–44)
LYMPHOCYTES # BLD AUTO: 5 % — LOW (ref 13–44)
MACROCYTES BLD QL: SLIGHT — SIGNIFICANT CHANGE UP
MACROCYTES BLD QL: SLIGHT — SIGNIFICANT CHANGE UP
MAGNESIUM SERPL-MCNC: 1.9 MG/DL — SIGNIFICANT CHANGE UP (ref 1.6–2.6)
MAGNESIUM SERPL-MCNC: 1.9 MG/DL — SIGNIFICANT CHANGE UP (ref 1.6–2.6)
MAGNESIUM SERPL-MCNC: 2.2 MG/DL — SIGNIFICANT CHANGE UP (ref 1.6–2.6)
MAGNESIUM SERPL-MCNC: 2.2 MG/DL — SIGNIFICANT CHANGE UP (ref 1.6–2.6)
MANUAL SMEAR VERIFICATION: SIGNIFICANT CHANGE UP
MANUAL SMEAR VERIFICATION: SIGNIFICANT CHANGE UP
MCHC RBC-ENTMCNC: 32.9 GM/DL — SIGNIFICANT CHANGE UP (ref 32–36)
MCHC RBC-ENTMCNC: 32.9 GM/DL — SIGNIFICANT CHANGE UP (ref 32–36)
MCHC RBC-ENTMCNC: 34.4 PG — HIGH (ref 27–34)
MCHC RBC-ENTMCNC: 34.4 PG — HIGH (ref 27–34)
MCV RBC AUTO: 104.4 FL — HIGH (ref 80–100)
MCV RBC AUTO: 104.4 FL — HIGH (ref 80–100)
METHADONE UR-MCNC: NEGATIVE — SIGNIFICANT CHANGE UP
METHADONE UR-MCNC: NEGATIVE — SIGNIFICANT CHANGE UP
MONOCYTES # BLD AUTO: 1.71 K/UL — HIGH (ref 0–0.9)
MONOCYTES # BLD AUTO: 1.71 K/UL — HIGH (ref 0–0.9)
MONOCYTES NFR BLD AUTO: 7 % — SIGNIFICANT CHANGE UP (ref 2–14)
MONOCYTES NFR BLD AUTO: 7 % — SIGNIFICANT CHANGE UP (ref 2–14)
NEUTROPHILS # BLD AUTO: 21.44 K/UL — HIGH (ref 1.8–7.4)
NEUTROPHILS # BLD AUTO: 21.44 K/UL — HIGH (ref 1.8–7.4)
NEUTROPHILS NFR BLD AUTO: 87 % — HIGH (ref 43–77)
NEUTROPHILS NFR BLD AUTO: 87 % — HIGH (ref 43–77)
NEUTS BAND # BLD: 1 % — SIGNIFICANT CHANGE UP (ref 0–8)
NEUTS BAND # BLD: 1 % — SIGNIFICANT CHANGE UP (ref 0–8)
NITRITE UR-MCNC: NEGATIVE — SIGNIFICANT CHANGE UP
NITRITE UR-MCNC: NEGATIVE — SIGNIFICANT CHANGE UP
NRBC # BLD: 0 /100 — SIGNIFICANT CHANGE UP (ref 0–0)
NRBC # BLD: 0 /100 — SIGNIFICANT CHANGE UP (ref 0–0)
NRBC # BLD: SIGNIFICANT CHANGE UP /100 WBCS (ref 0–0)
NRBC # BLD: SIGNIFICANT CHANGE UP /100 WBCS (ref 0–0)
OPIATES UR-MCNC: NEGATIVE — SIGNIFICANT CHANGE UP
OPIATES UR-MCNC: NEGATIVE — SIGNIFICANT CHANGE UP
PCO2 BLDA: 16 MMHG — LOW (ref 32–45)
PCO2 BLDA: 16 MMHG — LOW (ref 32–45)
PCO2 BLDA: 19 MMHG — LOW (ref 32–45)
PCO2 BLDA: 19 MMHG — LOW (ref 32–45)
PCO2 BLDV: 20 MMHG — LOW (ref 39–42)
PCO2 BLDV: 20 MMHG — LOW (ref 39–42)
PCP SPEC-MCNC: SIGNIFICANT CHANGE UP
PCP SPEC-MCNC: SIGNIFICANT CHANGE UP
PCP UR-MCNC: NEGATIVE — SIGNIFICANT CHANGE UP
PCP UR-MCNC: NEGATIVE — SIGNIFICANT CHANGE UP
PH BLDA: 7.32 — LOW (ref 7.35–7.45)
PH BLDA: 7.32 — LOW (ref 7.35–7.45)
PH BLDA: 7.39 — SIGNIFICANT CHANGE UP (ref 7.35–7.45)
PH BLDA: 7.39 — SIGNIFICANT CHANGE UP (ref 7.35–7.45)
PH BLDV: 7.3 — LOW (ref 7.32–7.43)
PH BLDV: 7.3 — LOW (ref 7.32–7.43)
PH UR: 5.5 — SIGNIFICANT CHANGE UP (ref 5–8)
PH UR: 5.5 — SIGNIFICANT CHANGE UP (ref 5–8)
PHOSPHATE SERPL-MCNC: 0.6 MG/DL — CRITICAL LOW (ref 2.5–4.5)
PHOSPHATE SERPL-MCNC: 0.6 MG/DL — CRITICAL LOW (ref 2.5–4.5)
PHOSPHATE SERPL-MCNC: 0.8 MG/DL — CRITICAL LOW (ref 2.5–4.5)
PHOSPHATE SERPL-MCNC: 0.8 MG/DL — CRITICAL LOW (ref 2.5–4.5)
PLAT MORPH BLD: NORMAL — SIGNIFICANT CHANGE UP
PLAT MORPH BLD: NORMAL — SIGNIFICANT CHANGE UP
PLATELET # BLD AUTO: 285 K/UL — SIGNIFICANT CHANGE UP (ref 150–400)
PLATELET # BLD AUTO: 285 K/UL — SIGNIFICANT CHANGE UP (ref 150–400)
PO2 BLDA: 110 MMHG — HIGH (ref 83–108)
PO2 BLDA: 110 MMHG — HIGH (ref 83–108)
PO2 BLDA: 113 MMHG — HIGH (ref 83–108)
PO2 BLDA: 113 MMHG — HIGH (ref 83–108)
PO2 BLDV: 46 MMHG — HIGH (ref 25–45)
PO2 BLDV: 46 MMHG — HIGH (ref 25–45)
POTASSIUM SERPL-MCNC: 3.8 MMOL/L — SIGNIFICANT CHANGE UP (ref 3.5–5.3)
POTASSIUM SERPL-MCNC: 3.8 MMOL/L — SIGNIFICANT CHANGE UP (ref 3.5–5.3)
POTASSIUM SERPL-MCNC: 4.3 MMOL/L — SIGNIFICANT CHANGE UP (ref 3.5–5.3)
POTASSIUM SERPL-MCNC: 4.3 MMOL/L — SIGNIFICANT CHANGE UP (ref 3.5–5.3)
POTASSIUM SERPL-MCNC: 6.2 MMOL/L — CRITICAL HIGH (ref 3.5–5.3)
POTASSIUM SERPL-MCNC: 6.2 MMOL/L — CRITICAL HIGH (ref 3.5–5.3)
POTASSIUM SERPL-SCNC: 3.8 MMOL/L — SIGNIFICANT CHANGE UP (ref 3.5–5.3)
POTASSIUM SERPL-SCNC: 3.8 MMOL/L — SIGNIFICANT CHANGE UP (ref 3.5–5.3)
POTASSIUM SERPL-SCNC: 4.3 MMOL/L — SIGNIFICANT CHANGE UP (ref 3.5–5.3)
POTASSIUM SERPL-SCNC: 4.3 MMOL/L — SIGNIFICANT CHANGE UP (ref 3.5–5.3)
POTASSIUM SERPL-SCNC: 6.2 MMOL/L — CRITICAL HIGH (ref 3.5–5.3)
POTASSIUM SERPL-SCNC: 6.2 MMOL/L — CRITICAL HIGH (ref 3.5–5.3)
PROT SERPL-MCNC: 7.7 GM/DL — SIGNIFICANT CHANGE UP (ref 6–8.3)
PROT SERPL-MCNC: 7.7 GM/DL — SIGNIFICANT CHANGE UP (ref 6–8.3)
PROT SERPL-MCNC: 7.8 GM/DL — SIGNIFICANT CHANGE UP (ref 6–8.3)
PROT SERPL-MCNC: 7.8 GM/DL — SIGNIFICANT CHANGE UP (ref 6–8.3)
PROT SERPL-MCNC: 9.3 GM/DL — HIGH (ref 6–8.3)
PROT SERPL-MCNC: 9.3 GM/DL — HIGH (ref 6–8.3)
PROT UR-MCNC: SIGNIFICANT CHANGE UP MG/DL
PROT UR-MCNC: SIGNIFICANT CHANGE UP MG/DL
PROTHROM AB SERPL-ACNC: 11 SEC — SIGNIFICANT CHANGE UP (ref 9.5–13)
PROTHROM AB SERPL-ACNC: 11 SEC — SIGNIFICANT CHANGE UP (ref 9.5–13)
RBC # BLD: 3.84 M/UL — SIGNIFICANT CHANGE UP (ref 3.8–5.2)
RBC # BLD: 3.84 M/UL — SIGNIFICANT CHANGE UP (ref 3.8–5.2)
RBC # FLD: 11.9 % — SIGNIFICANT CHANGE UP (ref 10.3–14.5)
RBC # FLD: 11.9 % — SIGNIFICANT CHANGE UP (ref 10.3–14.5)
RBC BLD AUTO: SIGNIFICANT CHANGE UP
RBC BLD AUTO: SIGNIFICANT CHANGE UP
RBC CASTS # UR COMP ASSIST: 0 /HPF — SIGNIFICANT CHANGE UP (ref 0–4)
RBC CASTS # UR COMP ASSIST: 0 /HPF — SIGNIFICANT CHANGE UP (ref 0–4)
SALICYLATES SERPL-MCNC: 4 MG/DL — SIGNIFICANT CHANGE UP (ref 2.8–20)
SALICYLATES SERPL-MCNC: 4 MG/DL — SIGNIFICANT CHANGE UP (ref 2.8–20)
SAO2 % BLDA: 100 % — HIGH (ref 94–98)
SAO2 % BLDA: 100 % — HIGH (ref 94–98)
SAO2 % BLDA: 99 % — HIGH (ref 94–98)
SAO2 % BLDA: 99 % — HIGH (ref 94–98)
SAO2 % BLDV: 81 % — SIGNIFICANT CHANGE UP (ref 67–88)
SAO2 % BLDV: 81 % — SIGNIFICANT CHANGE UP (ref 67–88)
SODIUM SERPL-SCNC: 127 MMOL/L — LOW (ref 135–145)
SODIUM SERPL-SCNC: 127 MMOL/L — LOW (ref 135–145)
SODIUM SERPL-SCNC: 135 MMOL/L — SIGNIFICANT CHANGE UP (ref 135–145)
SODIUM SERPL-SCNC: 135 MMOL/L — SIGNIFICANT CHANGE UP (ref 135–145)
SODIUM SERPL-SCNC: 139 MMOL/L — SIGNIFICANT CHANGE UP (ref 135–145)
SODIUM SERPL-SCNC: 139 MMOL/L — SIGNIFICANT CHANGE UP (ref 135–145)
SP GR SPEC: 1.01 — SIGNIFICANT CHANGE UP (ref 1–1.03)
SP GR SPEC: 1.01 — SIGNIFICANT CHANGE UP (ref 1–1.03)
SQUAMOUS # UR AUTO: 0 /HPF — SIGNIFICANT CHANGE UP (ref 0–5)
SQUAMOUS # UR AUTO: 0 /HPF — SIGNIFICANT CHANGE UP (ref 0–5)
THC UR QL: POSITIVE — SIGNIFICANT CHANGE UP
THC UR QL: POSITIVE — SIGNIFICANT CHANGE UP
TSH SERPL-MCNC: 3.79 UU/ML — SIGNIFICANT CHANGE UP (ref 0.34–4.82)
TSH SERPL-MCNC: 3.79 UU/ML — SIGNIFICANT CHANGE UP (ref 0.34–4.82)
UROBILINOGEN FLD QL: 0.2 MG/DL — SIGNIFICANT CHANGE UP (ref 0.2–1)
UROBILINOGEN FLD QL: 0.2 MG/DL — SIGNIFICANT CHANGE UP (ref 0.2–1)
WBC # BLD: 24.36 K/UL — HIGH (ref 3.8–10.5)
WBC # BLD: 24.36 K/UL — HIGH (ref 3.8–10.5)
WBC # FLD AUTO: 24.36 K/UL — HIGH (ref 3.8–10.5)
WBC # FLD AUTO: 24.36 K/UL — HIGH (ref 3.8–10.5)
WBC UR QL: 0 /HPF — SIGNIFICANT CHANGE UP (ref 0–5)
WBC UR QL: 0 /HPF — SIGNIFICANT CHANGE UP (ref 0–5)

## 2023-10-27 RX ORDER — ONDANSETRON 8 MG/1
4 TABLET, FILM COATED ORAL ONCE
Refills: 0 | Status: COMPLETED | OUTPATIENT
Start: 2023-10-27 | End: 2023-10-27

## 2023-10-27 RX ORDER — PIPERACILLIN AND TAZOBACTAM 4; .5 G/20ML; G/20ML
3.38 INJECTION, POWDER, LYOPHILIZED, FOR SOLUTION INTRAVENOUS ONCE
Refills: 0 | Status: COMPLETED | OUTPATIENT
Start: 2023-10-27 | End: 2023-10-27

## 2023-10-27 RX ORDER — SODIUM CHLORIDE 9 MG/ML
1000 INJECTION INTRAMUSCULAR; INTRAVENOUS; SUBCUTANEOUS ONCE
Refills: 0 | Status: COMPLETED | OUTPATIENT
Start: 2023-10-27 | End: 2023-10-27

## 2023-10-27 RX ORDER — PIPERACILLIN AND TAZOBACTAM 4; .5 G/20ML; G/20ML
3.38 INJECTION, POWDER, LYOPHILIZED, FOR SOLUTION INTRAVENOUS EVERY 8 HOURS
Refills: 0 | Status: DISCONTINUED | OUTPATIENT
Start: 2023-10-27 | End: 2023-10-30

## 2023-10-27 RX ORDER — INSULIN HUMAN 100 [IU]/ML
5 INJECTION, SOLUTION SUBCUTANEOUS ONCE
Refills: 0 | Status: COMPLETED | OUTPATIENT
Start: 2023-10-27 | End: 2023-10-27

## 2023-10-27 RX ORDER — SODIUM,POTASSIUM PHOSPHATES 278-250MG
1 POWDER IN PACKET (EA) ORAL ONCE
Refills: 0 | Status: DISCONTINUED | OUTPATIENT
Start: 2023-10-27 | End: 2023-10-27

## 2023-10-27 RX ORDER — DROSPIRENONE 4 MG/1
1 TABLET, FILM COATED ORAL
Refills: 0 | DISCHARGE

## 2023-10-27 RX ORDER — PANTOPRAZOLE SODIUM 20 MG/1
40 TABLET, DELAYED RELEASE ORAL
Refills: 0 | Status: DISCONTINUED | OUTPATIENT
Start: 2023-10-27 | End: 2023-10-28

## 2023-10-27 RX ORDER — HEPARIN SODIUM 5000 [USP'U]/ML
5000 INJECTION INTRAVENOUS; SUBCUTANEOUS EVERY 8 HOURS
Refills: 0 | Status: DISCONTINUED | OUTPATIENT
Start: 2023-10-27 | End: 2023-11-01

## 2023-10-27 RX ORDER — SODIUM CHLORIDE 9 MG/ML
1000 INJECTION, SOLUTION INTRAVENOUS ONCE
Refills: 0 | Status: COMPLETED | OUTPATIENT
Start: 2023-10-27 | End: 2023-10-27

## 2023-10-27 RX ORDER — CETIRIZINE HYDROCHLORIDE 10 MG/1
1 TABLET ORAL
Refills: 0 | DISCHARGE

## 2023-10-27 RX ORDER — ONDANSETRON 8 MG/1
4 TABLET, FILM COATED ORAL EVERY 6 HOURS
Refills: 0 | Status: DISCONTINUED | OUTPATIENT
Start: 2023-10-27 | End: 2023-11-01

## 2023-10-27 RX ORDER — CHOLECALCIFEROL (VITAMIN D3) 125 MCG
1 CAPSULE ORAL
Refills: 0 | DISCHARGE

## 2023-10-27 RX ORDER — SODIUM CHLORIDE 9 MG/ML
1000 INJECTION, SOLUTION INTRAVENOUS
Refills: 0 | Status: DISCONTINUED | OUTPATIENT
Start: 2023-10-27 | End: 2023-10-28

## 2023-10-27 RX ORDER — SODIUM BICARBONATE 1 MEQ/ML
50 SYRINGE (ML) INTRAVENOUS ONCE
Refills: 0 | Status: COMPLETED | OUTPATIENT
Start: 2023-10-27 | End: 2023-10-27

## 2023-10-27 RX ORDER — DEXTROSE 50 % IN WATER 50 %
50 SYRINGE (ML) INTRAVENOUS ONCE
Refills: 0 | Status: COMPLETED | OUTPATIENT
Start: 2023-10-27 | End: 2023-10-27

## 2023-10-27 RX ORDER — ACETAMINOPHEN 500 MG
1000 TABLET ORAL ONCE
Refills: 0 | Status: COMPLETED | OUTPATIENT
Start: 2023-10-27 | End: 2023-10-27

## 2023-10-27 RX ORDER — INFLUENZA VIRUS VACCINE 15; 15; 15; 15 UG/.5ML; UG/.5ML; UG/.5ML; UG/.5ML
0.5 SUSPENSION INTRAMUSCULAR ONCE
Refills: 0 | Status: DISCONTINUED | OUTPATIENT
Start: 2023-10-27 | End: 2023-11-01

## 2023-10-27 RX ORDER — PREGABALIN 225 MG/1
1 CAPSULE ORAL
Refills: 0 | DISCHARGE

## 2023-10-27 RX ADMIN — PIPERACILLIN AND TAZOBACTAM 200 GRAM(S): 4; .5 INJECTION, POWDER, LYOPHILIZED, FOR SOLUTION INTRAVENOUS at 17:08

## 2023-10-27 RX ADMIN — SODIUM CHLORIDE 1000 MILLILITER(S): 9 INJECTION INTRAMUSCULAR; INTRAVENOUS; SUBCUTANEOUS at 16:43

## 2023-10-27 RX ADMIN — Medication 400 MILLIGRAM(S): at 23:29

## 2023-10-27 RX ADMIN — SODIUM CHLORIDE 1000 MILLILITER(S): 9 INJECTION, SOLUTION INTRAVENOUS at 18:47

## 2023-10-27 RX ADMIN — ONDANSETRON 4 MILLIGRAM(S): 8 TABLET, FILM COATED ORAL at 13:21

## 2023-10-27 RX ADMIN — INSULIN HUMAN 5 UNIT(S): 100 INJECTION, SOLUTION SUBCUTANEOUS at 15:09

## 2023-10-27 RX ADMIN — SODIUM CHLORIDE 100 MILLILITER(S): 9 INJECTION, SOLUTION INTRAVENOUS at 18:43

## 2023-10-27 RX ADMIN — PANTOPRAZOLE SODIUM 40 MILLIGRAM(S): 20 TABLET, DELAYED RELEASE ORAL at 19:40

## 2023-10-27 RX ADMIN — HEPARIN SODIUM 5000 UNIT(S): 5000 INJECTION INTRAVENOUS; SUBCUTANEOUS at 22:36

## 2023-10-27 RX ADMIN — SODIUM CHLORIDE 1000 MILLILITER(S): 9 INJECTION INTRAMUSCULAR; INTRAVENOUS; SUBCUTANEOUS at 13:21

## 2023-10-27 RX ADMIN — Medication 50 MILLIEQUIVALENT(S): at 15:09

## 2023-10-27 RX ADMIN — PIPERACILLIN AND TAZOBACTAM 25 GRAM(S): 4; .5 INJECTION, POWDER, LYOPHILIZED, FOR SOLUTION INTRAVENOUS at 21:50

## 2023-10-27 RX ADMIN — Medication 62.5 MILLIMOLE(S): at 21:51

## 2023-10-27 RX ADMIN — Medication 50 MILLILITER(S): at 15:09

## 2023-10-27 NOTE — PHARMACOTHERAPY INTERVENTION NOTE - COMMENTS
Medication reconciliation completed.  Reviewed Medication list and confirmed med allergies with patient; confirmed with Dr. First Medcarloz.

## 2023-10-27 NOTE — ED PROVIDER NOTE - CLINICAL SUMMARY MEDICAL DECISION MAKING FREE TEXT BOX
32 yo female with persistent n/v since yesterday. On exam has RUQ ttp, concerning for biliary pathology. Plan for labs, fluids, and ultrasound.

## 2023-10-27 NOTE — ED ADULT NURSE REASSESSMENT NOTE - NS ED NURSE REASSESS COMMENT FT1
PT A&OX4, perrl 3mm, denies chest pain, sob, ST on CM, pt educated to notify staff if she feels suddenly sob, has shest pain/pressure due to air in mediastinum, trachea observed midline,  from ICU updated pt at this time, all questions answered.

## 2023-10-27 NOTE — ED ADULT TRIAGE NOTE - CHIEF COMPLAINT QUOTE
jessica from walk in clinic where she was seen for nausea and vomiting. Tachycardic to 160s. pt. received IVF en route and 8mg ODT Zofran.

## 2023-10-27 NOTE — PROGRESS NOTE ADULT - ASSESSMENT
32 y/o female presented after nausea, retching, multiple episodes of vomiting (at least 50 in previous 24 hrs per patient)   No fever, abd pain   Not a diabetic  Not pregnant   Uses edible marijuana and consumes ETOH (3-4 drinks 2-3 times/week)   Denies other drug use   Denies taking Tylenol or aspirin   No recent trauma       Found to have:  Prerenal JALEN with metabolic acidosis and hyperkalemia   Pneumomediastinum (likely from retching)  Leucocytosis, suspected sepsis (POA)       Plan:     Admit to CCU   IVF resuscitation, received 2L, will give 1 more L   Placed Montoya, has good UO   Mentation fine   LA cleared   Empiric zosyn, check cx   Check UDS, alcohol, APAP, salicylate levels   PPI   NPO for now, ice chips ok, may need EGD   Check HbA1C   Trend labs   DVT ppx with sc heparin       Further w/u and mx based on clinical course       Patient critically ill with risk for decompensation

## 2023-10-27 NOTE — H&P ADULT - NSHPREVIEWOFSYSTEMS_GEN_ALL_CORE
CONSTITUTIONAL: No fever, weight loss, or fatigue  EYES: No eye pain, visual disturbances, or discharge  ENMT:  No difficulty hearing, tinnitus, vertigo; No sinus or throat pain  NECK: No pain or stiffness  BREASTS: No pain, masses, or nipple discharge  RESPIRATORY: No cough, wheezing, chills or hemoptysis; + shortness of breath  CARDIOVASCULAR: No chest pain, palpitations, dizziness, or leg swelling  GASTROINTESTINAL: + abdominal discomfort, ++ nausea, ++vomiting, ++ retching No diarrhea or constipation. No melena or hematochezia.  GENITOURINARY: No dysuria, frequency, hematuria, or incontinence  NEUROLOGICAL: No headaches, memory loss, loss of strength, numbness, or tremors  SKIN: No itching, burning, rashes, or lesions   MUSCULOSKELETAL: No joint pain or swelling; No muscle, back, or extremity pain CONSTITUTIONAL: No fever, weight loss, or fatigue  EYES: No eye pain, visual disturbances, or discharge  ENMT:  No difficulty hearing, tinnitus, vertigo; No sinus or throat pain  NECK: No pain or stiffness  RESPIRATORY: No cough, wheezing, chills or hemoptysis; + shortness of breath  CARDIOVASCULAR: No chest pain, palpitations, dizziness, or leg swelling  GASTROINTESTINAL: + abdominal discomfort, ++ nausea, ++vomiting, ++ retching No diarrhea or constipation. No melena or hematochezia.  GENITOURINARY: No dysuria, frequency, hematuria, or incontinence  NEUROLOGICAL: No headaches, memory loss, loss of strength, numbness, or tremors  SKIN: No itching, burning, rashes, or lesions   MUSCULOSKELETAL: No joint pain or swelling; No muscle, back, or extremity pain

## 2023-10-27 NOTE — ED PROVIDER NOTE - PROGRESS NOTE DETAILS
Called by radiologist, patient w/ pneumomediastinum likely secondary to vomiting. Abx and BCx ordered, UR to page thoracic surgery. Patient ordered for dedicated CT chest. Will also speak with ICU for consult. Patient TBA, will be signed out to Dr. Barrios. Patient with multiple lab abnormalities.  WBC 24, sodium 127, potassium 6.2 nonhemolyzed, bicarb 7, anion gap 23, creatinine 2.48, lactate 2.1.  Right upper quadrant ultrasound insignificant for biliary pathology but does show right-sided mild hydronephrosis.  Given acute renal failure, patient was ordered for insulin, dextrose, IV fluids, sodium bicarbonate.  Spoke with Dr. Ansari from nephrology for consult.  Will obtain CT abdomen to rule out obstructing stone.  Patient remains tachycardic to 140s. Does not appear to be infectious etiology at this time. ICU saw pt in ED, appreciate consult. TBA to ICU Dr Baron. Paging CT surg regarding pneumomediastinum. MD CANDIS d/w Bryce, CT surg PA, will consult. MD CANDIS

## 2023-10-27 NOTE — CONSULT NOTE ADULT - SUBJECTIVE AND OBJECTIVE BOX
Surgeon: Higinio     Consult requesting by:  ER     HISTORY OF PRESENT ILLNESS:    32 yo female w/ no known PMHx presents to the ED BIBEMS from walk in clinic where she was evaluated for n/v. Pt woke up around 8am yesterday with nausea and persistent vomiting, and retching and felt she was having racing heart. Pt states she is having a hard time keeping food and liquid down. No recent sick contacts., denies diarrhea, no burning with urination. Pt is on birth control states she does not get her menstrual periods. Not on any other meds     Pt was examined and assessed at the bedside for ICU c/s  Pt denies recent ingestion of toxic solutions, ETOH, elicit drugs, OTC ETOH ot OTC Tylenol or ASA , denies h/o diabetes, denies suicidal ideation   There is a documented admission in  where the pt was admitted on the ICU service and was in ETOH withdrawal and DT's and is known to hospital and for ETOH abuse    Pt noted to have new JALEN, hyponatremia 127, hyperkalemia potassium 6.2, Bicarb 7. Ct scan with pneumomediastinum and mural thickening of the esophagus   '  Pt admitted to the ICU for workup if acidosis and renal failure - r/o toxicology as cause   PAST MEDICAL & SURGICAL HISTORY:  No pertinent past medical history      No significant past surgical history          MEDICATIONS  (STANDING):  heparin   Injectable 5000 Unit(s) SubCutaneous every 8 hours  lactated ringers. 1000 milliLiter(s) (100 mL/Hr) IV Continuous <Continuous>  pantoprazole  Injectable 40 milliGRAM(s) IV Push two times a day  piperacillin/tazobactam IVPB.. 3.375 Gram(s) IV Intermittent every 8 hours    MEDICATIONS  (PRN):  ondansetron Injectable 4 milliGRAM(s) IV Push every 6 hours PRN Nausea and/or Vomiting    Antiplatelet therapy:   HSQ                          Last dose/amt:    Allergies    No Known Allergies    Intolerances    Gluten (Unknown)  lactose (Unknown)      SOCIAL HISTORY:  Smoker: [ ] Yes  [ x] No        PACK YEARS:                         WHEN QUIT?  ETOH use: [x ] Yes  [ ] No              FREQUENCY / QUANTITY: denies -  Ilicit Drug use:  [ ] Yes  [ ] No  Occupation: unemployed   Live with:    Assisted device use:    FAMILY HISTORY:  No pertinent family history in first degree relatives        Review of Systems  CONSTITUTIONAL:  Fevers[ ] chills[ ] sweats[ ] fatigue[x ] weight loss[ ] weight gain [ ]                                     NEGATIVE [ ]   NEURO:  parathesias[ ] seizures [ ]  syncope [ ]  confusion [ ]                                                                                NEGATIVE[x ]   EYES: glasses[ ]  blurry vision[ ]  discharge[ ] pain[ ] glaucoma [ ]                                                                          NEGATIVE[x ]   ENMT:  difficulty hearing [ ]  vertigo[ ]  dysphagia[ ] epistaxis[ ] recent dental work [ ]                                    NEGATIVE[x ]   CV:  chest pain[ ] palpitations[ ] HORTA [ ] diaphoresis [ ]                                                                                           NEGATIVE[x ]   RESPIRATORY:  wheezing[ ] SOB[ ] cough [ ] sputum[ ] hemoptysis[ ]                                                                  NEGATIVE[ x]   GI:  nausea[ x]  vommiting [x ]  diarrhea[ ] constipation [ ] melena [ ]                                                                      NEGATIVE[ x]   : hematuria[ ]  dysuria[ ] urgency[ ] incontinence[ ]                                                                                            NEGATIVE[ ]   MUSKULOSKELETAL:  arthritis[ ]  joint swelling [ ] muscle weakness [ ]                                                                NEGATIVE[x ]   SKIN/BREAST:  rash[ ] itching [ ]  hair loss[ ] masses[ ]                                                                                              NEGATIVE[ x]   PSYCH:  dementia [ ] depresion [ ] anxiety[ ]                                                                                                               NEGATIVE[x ]   HEME/LYMPH:  bruises easily[ ] enlarged lymph nodes[ ] tender lymph nodes[ ]                                               NEGATIVE[x ]   ENDOCRINE:  cold intolerance[ ] heat intolerance[ ] polydipsia[ ]                                                                          NEGATIVE[x ]     PHYSICAL EXAM  Vital Signs Last 24 Hrs  T(C): 37.5 (27 Oct 2023 18:46), Max: 37.5 (27 Oct 2023 18:46)  T(F): 99.5 (27 Oct 2023 18:46), Max: 99.5 (27 Oct 2023 18:46)  HR: 132 (27 Oct 2023 18:46) (132 - 150)  BP: 148/81 (27 Oct 2023 18:46) (105/59 - 148/81)  BP(mean): 100 (27 Oct 2023 18:46) (91 - 100)  RR: 23 (27 Oct 2023 18:46) (18 - 23)  SpO2: 100% (27 Oct 2023 18:46) (100% - 100%)    Parameters below as of 27 Oct 2023 15:37  Patient On (Oxygen Delivery Method): room air        CONSTITUTIONAL:                                                                          WNL[x]   Neuro: WNL[x ] Normal exam oriented to person/place & time with no focal motor or sensory  deficits. Other                     Eyes: WNL[ x]   Normal exam of conjunctiva & lids, pupils equally reactive. Other     ENT: WNL[x ]    Normal exam of nasal/oral mucosa with absence of cyanosis. Other  Neck: WNL[x ]  Normal exam of jugular veins, trachea & thyroid. Other  Chest: WNLx[x ] Normal lung exam with good air movement absence of wheezes, rales, or rhonchi: Other                                                                                CV:  Auscultation: normal [ x] S3[ ] S4[ ] Irregular [ ] Rub[ ] Clicks[ ]    Murmurs none:[x ]systolic [ ]  diastolic [ ] holosystolic [ ]  Carotids: No Bruitsx ] Other                 Abdominal Aorta: normal [ ] nonpalpable[ ]Other                                                                                      GI:           WNL[ x] Normal exam of abdomen, liver & spleen with no noted masses or tenderness. Other                                                                                                        Extremities: WNL[x ] Normal no evidence of cyanosis or deformity Edema: none[ ]trace[ ]1+[ ]2+[ ]3+[ ]4+[ ]                                                            LABS:                        13.2   24.36 )-----------( 285      ( 27 Oct 2023 13:19 )             40.1     10-    127<L>  |  97  |  23  ----------------------------<  211<H>  6.2<HH>   |  7<LL>  |  2.48<H>    Ca    8.1<L>      27 Oct 2023 13:19  Mg     2.2     10-    TPro  9.3<H>  /  Alb  4.4  /  TBili  1.0  /  DBili  x   /  AST  27  /  ALT  32  /  AlkPhos  64  10-27    PT/INR - ( 27 Oct 2023 18:36 )   PT: 11.0 sec;   INR: 0.97 ratio         PTT - ( 27 Oct 2023 18:36 )  PTT:26.6 sec  Urinalysis Basic - ( 27 Oct 2023 17:07 )    Color: Yellow / Appearance: Clear / S.011 / pH: x  Gluc: x / Ketone: >=160 mg/dL  / Bili: Negative / Urobili: 0.2 mg/dL   Blood: x / Protein: Trace mg/dL / Nitrite: Negative   Leuk Esterase: Negative / RBC: 0 /HPF / WBC 0 /HPF   Sq Epi: x / Non Sq Epi: 0 /HPF / Bacteria: Negative /HPF      CARDIAC MARKERS ( 27 Oct 2023 13:19 )  x     / x     / 189 U/L / x     / x            < from: CT Chest No Cont (10.27.23 @ 16:27) >  IMPRESSION:  1. Mild to moderate pneumomediastinum, likely related to airway   barotrauma secondary to recent retching.  2. Distal esophageal thickening, compatible with recent vomiting and   esophagitis. Evaluation for esophageal tear is limited by the lack of   oral Gastrografin administration.  3. No evidence of mediastinal fluid collection.    < end of copied text >                                   Cardiac

## 2023-10-27 NOTE — ED PROVIDER NOTE - OBJECTIVE STATEMENT
34 yo female w/ no pertinent PMHx presents to the ED BIBEMS from walk in clinic where she was evaluated for n/v. Pt woke up around 8am yesterday with nausea and persistent vomiting. Pt with elevated HR. States she is having a hard time keeping food and liquid down. No recent sick contacts. Pt endorsing feeling weak, denies diarrhea, no burning with urination. Pt is on birth control states she does not get her menstrual periods. Denies vaginal discharge or bleeding. No PSHx. Pt is a social drinker, occasional cbd use, no tobacco use. Pt endorsing she feels like she is breathing heavily. States she is not having abdominal pain, but more so discomfort. No thyroid history.

## 2023-10-27 NOTE — ED ADULT NURSE REASSESSMENT NOTE - NS ED NURSE REASSESS COMMENT FT1
Pt received in bed by AM MARYSOL Benitez. Introduced self to pt and updated pt on plan of care. Pt verbalized understanding. Pt is AOX4, able to answer questions, and able to move extremities. Pt currently awaiting bed  at this time. NIHSS completed. Pt understands that she is NPO at this time. Bed at lowest height, Montoya in place and secure, fluids running, call bell within reach.

## 2023-10-27 NOTE — PATIENT PROFILE ADULT - FALL HARM RISK - HARM RISK INTERVENTIONS

## 2023-10-27 NOTE — CONSULT NOTE ADULT - SUBJECTIVE AND OBJECTIVE BOX
NEPHROLOGY CONSULT  HPI:  34 yo f, with severe N/V for 1 day w elevated K, Creat , bicarb 7  Received 2 L NS  Bicarb/ D50/ Insulin  Renal sono mild L hydro  CT stone hunt done- results pending  Feels a little better since received IVF  No flank pain, has generalized pain due to retching/ vomiting  no dysuria  Did not eat or drink anything that could have started it  Not on any diets or weigh loss meds  On BC pills prn zyrtec      PAST MEDICAL & SURGICAL HISTORY:  No pertinent past medical history      No significant past surgical history          FAMILY HISTORY:  No pertinent family history in first degree relatives        MEDICATIONS  (STANDING):    MEDICATIONS  (PRN):      Allergies    No Known Allergies    Intolerances        I&O's Summary        REVIEW OF SYSTEMS:    CONSTITUTIONAL:  As per HPI.  CONSTITUTIONAL: tachypnic  EYES/ENT: No visual changes;  No vertigo or throat pain   NECK: No pain or stiffness  CARDIOVASCULAR: No chest pain, fast heart rate  GASTROINTESTINAL: No abdominal or epigastric pain. No nausea, vomiting, or hematemesis; No diarrhea or constipation. No melena or hematochezia.  GENITOURINARY: No dysuria, frequency or hematuria  NEUROLOGICAL: No numbness or weakness  SKIN: No itching, burning, rashes, or lesions   All other review of systems is negative unless indicated above      Vital Signs Last 24 Hrs  T(C): 37.1 (27 Oct 2023 12:36), Max: 37.1 (27 Oct 2023 12:36)  T(F): 98.7 (27 Oct 2023 12:36), Max: 98.7 (27 Oct 2023 12:36)  HR: 147 (27 Oct 2023 12:36) (147 - 147)  BP: 105/59 (27 Oct 2023 12:36) (105/59 - 105/59)  BP(mean): --  RR: 20 (27 Oct 2023 12:36) (20 - 20)  SpO2: 100% (27 Oct 2023 12:36) (100% - 100%)    Parameters below as of 27 Oct 2023 12:36  Patient On (Oxygen Delivery Method): room air        Daily     Daily     I&O's Summary      PHYSICAL EXAM:    General:  Alert, tchypnic    Neuro:  Alert and oriented to person, place, and time. Able to communicate  well.      HEENT:  No JVD, no masses, Eyes anicteric, ,    Cardiovascular:  Regular rate and rhythm, with normal S1 and S2.    Lungs:  clear. no rales, no wheezing, .    Abdomen:  Normoactive bowel sounds. Soft, flat, non-tender, and non-distended.  positive bowel sounds    Skin:  Warm, dry    Extremities:  warm,  no cyanosis    LABS:                        13.2   24.36 )-----------( 285      ( 27 Oct 2023 13:19 )             40.1     10-27    127<L>  |  97  |  23  ----------------------------<  211<H>  6.2<HH>   |  7<LL>  |  2.48<H>    Ca    8.1<L>      27 Oct 2023 13:19  Mg     2.2     10-27    TPro  9.3<H>  /  Alb  4.4  /  TBili  1.0  /  DBili  x   /  AST  27  /  ALT  32  /  AlkPhos  64  10-27      Urinalysis Basic - ( 27 Oct 2023 13:19 )    Color: x / Appearance: x / SG: x / pH: x  Gluc: 211 mg/dL / Ketone: x  / Bili: x / Urobili: x   Blood: x / Protein: x / Nitrite: x   Leuk Esterase: x / RBC: x / WBC x   Sq Epi: x / Non Sq Epi: x / Bacteria: x      Magnesium: 2.2 mg/dL (10-27 @ 13:19)

## 2023-10-27 NOTE — H&P ADULT - NSHPPHYSICALEXAM_GEN_ALL_CORE
GENERAL: NAD, well-groomed, well-developed  HEAD:  Atraumatic, Normocephalic  EYES: EOMI, PERRLA, conjunctiva and sclera clear  ENMT: No tonsillar erythema, exudates, or enlargement; Moist mucous membranes, Good dentition, No lesions  NECK: Supple, No JVD, Normal thyroid  NERVOUS SYSTEM:  Alert & Oriented X3, Good concentration; Motor Strength 5/5 B/L upper and lower extremities; DTRs 2+ intact and symmetric  CHEST/LUNG: CTA bilaterally; No rales, rhonchi, wheezing, or rubs pt appears mildly tachypneic   HEART: Regular rate and rhythm; No murmurs, rubs, or gallops  ABDOMEN: Soft, mild tenderness to palpation in epigastric region pau, Nondistended; Bowel sounds present  EXTREMITIES:  2+ Peripheral Pulses, No clubbing, cyanosis, or edema  SKIN: No rashes or lesions

## 2023-10-27 NOTE — H&P ADULT - ASSESSMENT
***This patient requires critical care for support of one or more vital organ systems with a high probability of imminent or life threatening deterioration in his/her condition    **This patient requires a high level of care due to the complexity and severity of their condition which increases the amount and complexity of data to be reviewed and analyzed in addition to the risk of complications, morbidity and mortality of patient management        ====================ASSESSMENT ==============  1. JALEN ATN vs pre-renal   2. Hyponatremia   3. Hyperkalemia   4. HAGMA 2/2 ?r/o DKA and tox ingestion   5. Pneumomediastinum        Plan:  -Pt is awake alert and oriented X4 mentating well,  -there is some mild tachypnea present on exam which is expected with severe acidosis   -Pt is currently in sinus tach with -140 bpm, will continue hydration, again this is expected with such severe electrolyte disturbances   -Renal pending eval for electrolyte abnormalities and new JALEN  -Pt is s/p hyperK cocktail in the ER - bicarb insulin d50   -CMP q6h to monitor and trend electrolytes and bicarb, If bicarb remains low then will likely need to start on bicarb gtt   -Pending ABG, and toxicology w/o salicylates, Tylenol, ETOH and Utox   -Montoya in place at this time will monitor UOP and trend Cr   -Thoracic called for eval in the setting of pneumomediastinum - pt reports she had extensive vomiting and retching over the last 2 days   -Pt started on protonix BID given pneumomediastinum and some mural thickening in the esophagus   -Pt will eventually need EGD when more stable   -will start on empiric ABX with elevated WBC, no feevers at this rachel, pending PCT   -repeat LA pending as well as Coags, osmolality and HbA1c to r/o any possibility of DKA - acetone pending     Plan discussed in detail with ICU attending Dr. Baron           Critical Care time: 75 mins assessing presenting problems of acute illness that poses high probability of life threatening deterioration or end organ damage/dysfunction.  Medical decision making including Initiating plan of care, reviewing data, reviewing radiology, direct patient bedside evaluation and interpretation of vital signs, any necessary ventilator management , discussion with multidisciplinary team, discussing goals of care with patient/family, all non inclusive of procedures     Initial   Time spent on this patient encounter, which includes documenting this note in the electronic medical record, was 75 55 40 minutes including assessing the presenting problems with associated risks, reviewing medical records to prepare for the encounter, and meeting face to face with the patient to obtain additional history. I have also performed an appropriate physical exam, made interventions listed and ordered and interpreted appropriate diagnostic studies as documented. To improve communication and patient safety, I have coordinated care with the multidisciplinary team including the bedside nurse, appropriate attending of record and consultants as needed.     ***This patient requires critical care for support of one or more vital organ systems with a high probability of imminent or life threatening deterioration in his/her condition    **This patient requires a high level of care due to the complexity and severity of their condition which increases the amount and complexity of data to be reviewed and analyzed in addition to the risk of complications, morbidity and mortality of patient management        ====================ASSESSMENT ==============  1. JALEN ATN vs pre-renal   2. Hyponatremia   3. Hyperkalemia   4. HAGMA 2/2 ?r/o DKA and tox ingestion   5. Pneumomediastinum        Plan:  -Pt is awake alert and oriented X4 mentating well,  -there is some mild tachypnea present on exam which is expected with severe acidosis   -Pt is currently in sinus tach with -140 bpm, will continue hydration, again this is expected with such severe electrolyte disturbances   -Renal pending eval for electrolyte abnormalities and new JALEN  -Pt is s/p hyperK cocktail in the ER - bicarb insulin d50   -CMP q6h to monitor and trend electrolytes and bicarb, If bicarb remains low then will likely need to start on bicarb gtt   -Pending ABG, and toxicology w/o salicylates, Tylenol, ETOH and Utox   -Montoya in place at this time will monitor UOP and trend Cr   -Thoracic called for eval in the setting of pneumomediastinum - pt reports she had extensive vomiting and retching over the last 2 days   -Pt started on protonix BID given pneumomediastinum and some mural thickening in the esophagus   -Pt will eventually need EGD when more stable   -will start on empiric ABX with elevated WBC, no fevers at this rachel, pending PCT   -repeat LA pending as well as Coags, osmolality and HbA1c to r/o any possibility of DKA - acetone pending UA (r/o ketone Pending)  -If patient appears to have acidosis and electrolyte abnormalities from DKA will start on insulin      Plan discussed in detail with ICU attending Dr. Baron           Critical Care time: 75 mins assessing presenting problems of acute illness that poses high probability of life threatening deterioration or end organ damage/dysfunction.  Medical decision making including Initiating plan of care, reviewing data, reviewing radiology, direct patient bedside evaluation and interpretation of vital signs, any necessary ventilator management , discussion with multidisciplinary team, discussing goals of care with patient/family, all non inclusive of procedures     Initial   Time spent on this patient encounter, which includes documenting this note in the electronic medical record, was 75 55 40 minutes including assessing the presenting problems with associated risks, reviewing medical records to prepare for the encounter, and meeting face to face with the patient to obtain additional history. I have also performed an appropriate physical exam, made interventions listed and ordered and interpreted appropriate diagnostic studies as documented. To improve communication and patient safety, I have coordinated care with the multidisciplinary team including the bedside nurse, appropriate attending of record and consultants as needed.     ***This patient requires critical care for support of one or more vital organ systems with a high probability of imminent or life threatening deterioration in his/her condition      ====================ASSESSMENT ==============  1. JALEN ATN vs pre-renal   2. Hyponatremia   3. Hyperkalemia   4. HAGMA 2/2 ?r/o DKA and tox ingestion   5. Pneumomediastinum        Plan:  -Pt is awake alert and oriented X4 mentating well,  -there is some mild tachypnea present on exam which is expected with severe acidosis   -Pt is currently in sinus tach with -140 bpm, will continue hydration, again this is expected with such severe electrolyte disturbances   -Renal pending eval for electrolyte abnormalities and new JALEN  -Pt is s/p hyperK cocktail in the ER - bicarb insulin d50   -CMP q6h to monitor and trend electrolytes and bicarb, If bicarb remains low then will likely need to start on bicarb gtt   -Pending ABG, and toxicology w/o salicylates, Tylenol, ETOH and Utox   -Montoya in place at this time will monitor UOP and trend Cr   -Thoracic called for eval in the setting of pneumomediastinum - pt reports she had extensive vomiting and retching over the last 2 days   -Pt started on protonix BID given pneumomediastinum and some mural thickening in the esophagus   -Pt will eventually need EGD when more stable   -will start on empiric ABX with elevated WBC, no fevers at this rachel, pending PCT   -repeat LA pending as well as Coags, osmolality and HbA1c to r/o any possibility of DKA - acetone pending UA (r/o ketone Pending)  -If patient appears to have acidosis and electrolyte abnormalities from DKA will start on insulin      Plan discussed in detail with ICU attending Dr. Baron           Critical Care time: 75 mins assessing presenting problems of acute illness that poses high probability of life threatening deterioration or end organ damage/dysfunction.  Medical decision making including Initiating plan of care, reviewing data, reviewing radiology, direct patient bedside evaluation and interpretation of vital signs, any necessary ventilator management , discussion with multidisciplinary team, discussing goals of care with patient/family, all non inclusive of procedures

## 2023-10-27 NOTE — H&P ADULT - NSHPLABSRESULTS_GEN_ALL_CORE
Lab Results:  CBC  CBC Full  -  ( 27 Oct 2023 13:19 )  WBC Count : 24.36 K/uL  RBC Count : 3.84 M/uL  Hemoglobin : 13.2 g/dL  Hematocrit : 40.1 %  Platelet Count - Automated : 285 K/uL  Mean Cell Volume : 104.4 fl  Mean Cell Hemoglobin : 34.4 pg  Mean Cell Hemoglobin Concentration : 32.9 gm/dL  Auto Neutrophil # : 21.44 K/uL  Auto Lymphocyte # : 1.22 K/uL  Auto Monocyte # : 1.71 K/uL  Auto Eosinophil # : 0.00 K/uL  Auto Basophil # : 0.00 K/uL  Auto Neutrophil % : 87.0 %  Auto Lymphocyte % : 5.0 %  Auto Monocyte % : 7.0 %  Auto Eosinophil % : 0.0 %  Auto Basophil % : 0.0 %    .		Differential:	[] Automated		[] Manual  Chemistry                        13.2   24.36 )-----------( 285      ( 27 Oct 2023 13:19 )             40.1     10-27    127<L>  |  97  |  23  ----------------------------<  211<H>  6.2<HH>   |  7<LL>  |  2.48<H>    Ca    8.1<L>      27 Oct 2023 13:19  Mg     2.2     10-27    TPro  9.3<H>  /  Alb  4.4  /  TBili  1.0  /  DBili  x   /  AST  27  /  ALT  32  /  AlkPhos  64  10-27    LIVER FUNCTIONS - ( 27 Oct 2023 13:19 )  Alb: 4.4 g/dL / Pro: 9.3 gm/dL / ALK PHOS: 64 U/L / ALT: 32 U/L / AST: 27 U/L / GGT: x             Urinalysis Basic - ( 27 Oct 2023 13:19 )    Color: x / Appearance: x / SG: x / pH: x  Gluc: 211 mg/dL / Ketone: x  / Bili: x / Urobili: x   Blood: x / Protein: x / Nitrite: x   Leuk Esterase: x / RBC: x / WBC x   Sq Epi: x / Non Sq Epi: x / Bacteria: x      MICROBIOLOGY/CULTURES:  Pending Bcx Ucx     RADIOLOGY RESULTS:  < from: CT Abdomen and Pelvis No Cont (10.27.23 @ 15:26) >    IMPRESSION:  Apparent partially imaged pneumomediastinum adjacent to the distal   esophagus and thoracic descending aorta. Mild distal esophageal mural   thickening. Possibility of esophageal perforation cannot be excluded.    No evidence for a urinary calculus. No hydronephrosis.    Dr. Chicas isinformed with read back.    --- End of Report ---            < end of copied text >

## 2023-10-27 NOTE — H&P ADULT - HISTORY OF PRESENT ILLNESS
34 yo female w/ no known PMHx presents to the ED BIBEMS from walk in clinic where she was evaluated for n/v. Pt woke up around 8am yesterday with nausea and persistent vomiting, and retching and felt she was having racing heart. Pt states she is having a hard time keeping food and liquid down. No recent sick contacts., denies diarrhea, no burning with urination. Pt is on birth control states she does not get her menstrual periods. Not on any other meds     Pt was examined and assessed at the bedside for ICU c/s  Pt denies recent ingestion of toxic solutions, ETOH, elicit drugs, OTC ETOH ot OTC Tylenol or ASA , denies h/o diabetes, denies suicidal ideation     Pt notd to have new JALEN, hyponatremia 127, hyperkalemia potassium 6.2, Bicarb 7. Ct scan with pneumomediastinum and mural thickening of the esophagus   '  Pt admitted to the ICU for workup if acidosis and renal failure      34 yo female w/ no known PMHx presents to the ED BIBEMS from walk in clinic where she was evaluated for n/v. Pt woke up around 8am yesterday with nausea and persistent vomiting, and retching and felt she was having racing heart. Pt states she is having a hard time keeping food and liquid down. No recent sick contacts., denies diarrhea, no burning with urination. Pt is on birth control states she does not get her menstrual periods. Not on any other meds     Pt was examined and assessed at the bedside for ICU c/s  Pt denies recent ingestion of toxic solutions, ETOH, elicit drugs, OTC ETOH ot OTC Tylenol or ASA , denies h/o diabetes, denies suicidal ideation   There is a documented admission in 2022 where the pt was admitted on the ICU service and was in ETOH withdrawal and DT's and is known to hospital and for ETOH abuse    Pt noted to have new JALEN, hyponatremia 127, hyperkalemia potassium 6.2, Bicarb 7. Ct scan with pneumomediastinum and mural thickening of the esophagus   '  Pt admitted to the ICU for workup if acidosis and renal failure - r/o toxicology as cause

## 2023-10-27 NOTE — ED PROVIDER NOTE - PHYSICAL EXAMINATION
GENERAL: A&Ox4, non-toxic appearing, no acute distress  HEENT: NCAT, EOMI, oral mucosa moist, normal conjunctiva  RESP: CTAB, no respiratory distress, no wheezes/rhonchi/rales, speaking in full sentences  CV: tachycardic, no murmurs/rubs/gallops  ABDOMEN: soft, non-distended, no guarding, no CVA tenderness, RUQ ttp, +olguin's sign  MSK: no visible deformities  NEURO: no focal sensory or motor deficits, CN 2-12 grossly intact  SKIN: warm, normal color, well perfused, no rash  PSYCH: normal affect

## 2023-10-28 LAB
A1C WITH ESTIMATED AVERAGE GLUCOSE RESULT: 5.6 % — SIGNIFICANT CHANGE UP (ref 4–5.6)
A1C WITH ESTIMATED AVERAGE GLUCOSE RESULT: 5.6 % — SIGNIFICANT CHANGE UP (ref 4–5.6)
ALBUMIN SERPL ELPH-MCNC: 3.6 G/DL — SIGNIFICANT CHANGE UP (ref 3.3–5)
ALBUMIN SERPL ELPH-MCNC: 3.6 G/DL — SIGNIFICANT CHANGE UP (ref 3.3–5)
ALP SERPL-CCNC: 45 U/L — SIGNIFICANT CHANGE UP (ref 40–120)
ALP SERPL-CCNC: 45 U/L — SIGNIFICANT CHANGE UP (ref 40–120)
ALT FLD-CCNC: 25 U/L — SIGNIFICANT CHANGE UP (ref 12–78)
ALT FLD-CCNC: 25 U/L — SIGNIFICANT CHANGE UP (ref 12–78)
ANION GAP SERPL CALC-SCNC: 10 MMOL/L — SIGNIFICANT CHANGE UP (ref 5–17)
ANION GAP SERPL CALC-SCNC: 10 MMOL/L — SIGNIFICANT CHANGE UP (ref 5–17)
ANION GAP SERPL CALC-SCNC: 11 MMOL/L — SIGNIFICANT CHANGE UP (ref 5–17)
ANION GAP SERPL CALC-SCNC: 11 MMOL/L — SIGNIFICANT CHANGE UP (ref 5–17)
AST SERPL-CCNC: 26 U/L — SIGNIFICANT CHANGE UP (ref 15–37)
AST SERPL-CCNC: 26 U/L — SIGNIFICANT CHANGE UP (ref 15–37)
BASE EXCESS BLDA CALC-SCNC: -4.9 MMOL/L — LOW (ref -2–3)
BASE EXCESS BLDA CALC-SCNC: -4.9 MMOL/L — LOW (ref -2–3)
BASOPHILS # BLD AUTO: 0.03 K/UL — SIGNIFICANT CHANGE UP (ref 0–0.2)
BASOPHILS # BLD AUTO: 0.03 K/UL — SIGNIFICANT CHANGE UP (ref 0–0.2)
BASOPHILS NFR BLD AUTO: 0.3 % — SIGNIFICANT CHANGE UP (ref 0–2)
BASOPHILS NFR BLD AUTO: 0.3 % — SIGNIFICANT CHANGE UP (ref 0–2)
BILIRUB SERPL-MCNC: 0.8 MG/DL — SIGNIFICANT CHANGE UP (ref 0.2–1.2)
BILIRUB SERPL-MCNC: 0.8 MG/DL — SIGNIFICANT CHANGE UP (ref 0.2–1.2)
BLOOD GAS COMMENTS ARTERIAL: SIGNIFICANT CHANGE UP
BLOOD GAS COMMENTS ARTERIAL: SIGNIFICANT CHANGE UP
BUN SERPL-MCNC: 3 MG/DL — LOW (ref 7–23)
BUN SERPL-MCNC: 3 MG/DL — LOW (ref 7–23)
BUN SERPL-MCNC: 6 MG/DL — LOW (ref 7–23)
BUN SERPL-MCNC: 6 MG/DL — LOW (ref 7–23)
CALCIUM SERPL-MCNC: 8.8 MG/DL — SIGNIFICANT CHANGE UP (ref 8.5–10.1)
CHLORIDE SERPL-SCNC: 110 MMOL/L — HIGH (ref 96–108)
CHLORIDE SERPL-SCNC: 110 MMOL/L — HIGH (ref 96–108)
CHLORIDE SERPL-SCNC: 111 MMOL/L — HIGH (ref 96–108)
CHLORIDE SERPL-SCNC: 111 MMOL/L — HIGH (ref 96–108)
CO2 SERPL-SCNC: 20 MMOL/L — LOW (ref 22–31)
CO2 SERPL-SCNC: 20 MMOL/L — LOW (ref 22–31)
CO2 SERPL-SCNC: 21 MMOL/L — LOW (ref 22–31)
CO2 SERPL-SCNC: 21 MMOL/L — LOW (ref 22–31)
CREAT SERPL-MCNC: 0.61 MG/DL — SIGNIFICANT CHANGE UP (ref 0.5–1.3)
CREAT SERPL-MCNC: 0.61 MG/DL — SIGNIFICANT CHANGE UP (ref 0.5–1.3)
CREAT SERPL-MCNC: 0.81 MG/DL — SIGNIFICANT CHANGE UP (ref 0.5–1.3)
CREAT SERPL-MCNC: 0.81 MG/DL — SIGNIFICANT CHANGE UP (ref 0.5–1.3)
CULTURE RESULTS: NO GROWTH — SIGNIFICANT CHANGE UP
CULTURE RESULTS: NO GROWTH — SIGNIFICANT CHANGE UP
EGFR: 121 ML/MIN/1.73M2 — SIGNIFICANT CHANGE UP
EGFR: 121 ML/MIN/1.73M2 — SIGNIFICANT CHANGE UP
EGFR: 98 ML/MIN/1.73M2 — SIGNIFICANT CHANGE UP
EGFR: 98 ML/MIN/1.73M2 — SIGNIFICANT CHANGE UP
EOSINOPHIL # BLD AUTO: 0 K/UL — SIGNIFICANT CHANGE UP (ref 0–0.5)
EOSINOPHIL # BLD AUTO: 0 K/UL — SIGNIFICANT CHANGE UP (ref 0–0.5)
EOSINOPHIL NFR BLD AUTO: 0 % — SIGNIFICANT CHANGE UP (ref 0–6)
EOSINOPHIL NFR BLD AUTO: 0 % — SIGNIFICANT CHANGE UP (ref 0–6)
ESTIMATED AVERAGE GLUCOSE: 114 MG/DL — SIGNIFICANT CHANGE UP (ref 68–114)
ESTIMATED AVERAGE GLUCOSE: 114 MG/DL — SIGNIFICANT CHANGE UP (ref 68–114)
GLUCOSE SERPL-MCNC: 115 MG/DL — HIGH (ref 70–99)
GLUCOSE SERPL-MCNC: 115 MG/DL — HIGH (ref 70–99)
GLUCOSE SERPL-MCNC: 139 MG/DL — HIGH (ref 70–99)
GLUCOSE SERPL-MCNC: 139 MG/DL — HIGH (ref 70–99)
HCO3 BLDA-SCNC: 17 MMOL/L — LOW (ref 21–28)
HCO3 BLDA-SCNC: 17 MMOL/L — LOW (ref 21–28)
HCT VFR BLD CALC: 31.8 % — LOW (ref 34.5–45)
HCT VFR BLD CALC: 31.8 % — LOW (ref 34.5–45)
HGB BLD-MCNC: 11.2 G/DL — LOW (ref 11.5–15.5)
HGB BLD-MCNC: 11.2 G/DL — LOW (ref 11.5–15.5)
IMM GRANULOCYTES NFR BLD AUTO: 0.3 % — SIGNIFICANT CHANGE UP (ref 0–0.9)
IMM GRANULOCYTES NFR BLD AUTO: 0.3 % — SIGNIFICANT CHANGE UP (ref 0–0.9)
LYMPHOCYTES # BLD AUTO: 0.99 K/UL — LOW (ref 1–3.3)
LYMPHOCYTES # BLD AUTO: 0.99 K/UL — LOW (ref 1–3.3)
LYMPHOCYTES # BLD AUTO: 10.3 % — LOW (ref 13–44)
LYMPHOCYTES # BLD AUTO: 10.3 % — LOW (ref 13–44)
MAGNESIUM SERPL-MCNC: 2.1 MG/DL — SIGNIFICANT CHANGE UP (ref 1.6–2.6)
MAGNESIUM SERPL-MCNC: 2.1 MG/DL — SIGNIFICANT CHANGE UP (ref 1.6–2.6)
MCHC RBC-ENTMCNC: 34.7 PG — HIGH (ref 27–34)
MCHC RBC-ENTMCNC: 34.7 PG — HIGH (ref 27–34)
MCHC RBC-ENTMCNC: 35.2 GM/DL — SIGNIFICANT CHANGE UP (ref 32–36)
MCHC RBC-ENTMCNC: 35.2 GM/DL — SIGNIFICANT CHANGE UP (ref 32–36)
MCV RBC AUTO: 98.5 FL — SIGNIFICANT CHANGE UP (ref 80–100)
MCV RBC AUTO: 98.5 FL — SIGNIFICANT CHANGE UP (ref 80–100)
MONOCYTES # BLD AUTO: 0.55 K/UL — SIGNIFICANT CHANGE UP (ref 0–0.9)
MONOCYTES # BLD AUTO: 0.55 K/UL — SIGNIFICANT CHANGE UP (ref 0–0.9)
MONOCYTES NFR BLD AUTO: 5.7 % — SIGNIFICANT CHANGE UP (ref 2–14)
MONOCYTES NFR BLD AUTO: 5.7 % — SIGNIFICANT CHANGE UP (ref 2–14)
NEUTROPHILS # BLD AUTO: 8.04 K/UL — HIGH (ref 1.8–7.4)
NEUTROPHILS # BLD AUTO: 8.04 K/UL — HIGH (ref 1.8–7.4)
NEUTROPHILS NFR BLD AUTO: 83.4 % — HIGH (ref 43–77)
NEUTROPHILS NFR BLD AUTO: 83.4 % — HIGH (ref 43–77)
OSMOLALITY SERPL: 299 MOSMOL/KG — SIGNIFICANT CHANGE UP (ref 275–300)
OSMOLALITY SERPL: 299 MOSMOL/KG — SIGNIFICANT CHANGE UP (ref 275–300)
OSMOLALITY UR: 371 MOSM/KG — SIGNIFICANT CHANGE UP (ref 50–1200)
OSMOLALITY UR: 371 MOSM/KG — SIGNIFICANT CHANGE UP (ref 50–1200)
PCO2 BLDA: 24 MMHG — LOW (ref 32–45)
PCO2 BLDA: 24 MMHG — LOW (ref 32–45)
PH BLDA: 7.46 — HIGH (ref 7.35–7.45)
PH BLDA: 7.46 — HIGH (ref 7.35–7.45)
PHOSPHATE SERPL-MCNC: 1.4 MG/DL — LOW (ref 2.5–4.5)
PHOSPHATE SERPL-MCNC: 1.4 MG/DL — LOW (ref 2.5–4.5)
PHOSPHATE SERPL-MCNC: 2.2 MG/DL — LOW (ref 2.5–4.5)
PHOSPHATE SERPL-MCNC: 2.2 MG/DL — LOW (ref 2.5–4.5)
PLATELET # BLD AUTO: 207 K/UL — SIGNIFICANT CHANGE UP (ref 150–400)
PLATELET # BLD AUTO: 207 K/UL — SIGNIFICANT CHANGE UP (ref 150–400)
PO2 BLDA: 96 MMHG — SIGNIFICANT CHANGE UP (ref 83–108)
PO2 BLDA: 96 MMHG — SIGNIFICANT CHANGE UP (ref 83–108)
POTASSIUM SERPL-MCNC: 2.9 MMOL/L — CRITICAL LOW (ref 3.5–5.3)
POTASSIUM SERPL-MCNC: 3.4 MMOL/L — LOW (ref 3.5–5.3)
POTASSIUM SERPL-MCNC: 3.4 MMOL/L — LOW (ref 3.5–5.3)
POTASSIUM SERPL-SCNC: 2.9 MMOL/L — CRITICAL LOW (ref 3.5–5.3)
POTASSIUM SERPL-SCNC: 3.4 MMOL/L — LOW (ref 3.5–5.3)
POTASSIUM SERPL-SCNC: 3.4 MMOL/L — LOW (ref 3.5–5.3)
PROCALCITONIN SERPL-MCNC: 0.42 NG/ML — HIGH (ref 0.02–0.1)
PROCALCITONIN SERPL-MCNC: 0.42 NG/ML — HIGH (ref 0.02–0.1)
PROT SERPL-MCNC: 7.5 GM/DL — SIGNIFICANT CHANGE UP (ref 6–8.3)
PROT SERPL-MCNC: 7.5 GM/DL — SIGNIFICANT CHANGE UP (ref 6–8.3)
RBC # BLD: 3.23 M/UL — LOW (ref 3.8–5.2)
RBC # BLD: 3.23 M/UL — LOW (ref 3.8–5.2)
RBC # FLD: 11.7 % — SIGNIFICANT CHANGE UP (ref 10.3–14.5)
RBC # FLD: 11.7 % — SIGNIFICANT CHANGE UP (ref 10.3–14.5)
SAO2 % BLDA: 99 % — HIGH (ref 94–98)
SAO2 % BLDA: 99 % — HIGH (ref 94–98)
SODIUM SERPL-SCNC: 141 MMOL/L — SIGNIFICANT CHANGE UP (ref 135–145)
SODIUM SERPL-SCNC: 141 MMOL/L — SIGNIFICANT CHANGE UP (ref 135–145)
SODIUM SERPL-SCNC: 142 MMOL/L — SIGNIFICANT CHANGE UP (ref 135–145)
SODIUM SERPL-SCNC: 142 MMOL/L — SIGNIFICANT CHANGE UP (ref 135–145)
SODIUM UR-SCNC: 72 MMOL/L — SIGNIFICANT CHANGE UP
SODIUM UR-SCNC: 72 MMOL/L — SIGNIFICANT CHANGE UP
SPECIMEN SOURCE: SIGNIFICANT CHANGE UP
SPECIMEN SOURCE: SIGNIFICANT CHANGE UP
TRIGL SERPL-MCNC: 332 MG/DL — HIGH
TRIGL SERPL-MCNC: 332 MG/DL — HIGH
WBC # BLD: 9.64 K/UL — SIGNIFICANT CHANGE UP (ref 3.8–10.5)
WBC # BLD: 9.64 K/UL — SIGNIFICANT CHANGE UP (ref 3.8–10.5)
WBC # FLD AUTO: 9.64 K/UL — SIGNIFICANT CHANGE UP (ref 3.8–10.5)
WBC # FLD AUTO: 9.64 K/UL — SIGNIFICANT CHANGE UP (ref 3.8–10.5)

## 2023-10-28 RX ORDER — SODIUM CHLORIDE 9 MG/ML
1000 INJECTION, SOLUTION INTRAVENOUS
Refills: 0 | Status: COMPLETED | OUTPATIENT
Start: 2023-10-28 | End: 2023-10-29

## 2023-10-28 RX ORDER — SODIUM,POTASSIUM PHOSPHATES 278-250MG
2 POWDER IN PACKET (EA) ORAL ONCE
Refills: 0 | Status: COMPLETED | OUTPATIENT
Start: 2023-10-28 | End: 2023-10-30

## 2023-10-28 RX ORDER — POTASSIUM PHOSPHATE, MONOBASIC POTASSIUM PHOSPHATE, DIBASIC 236; 224 MG/ML; MG/ML
15 INJECTION, SOLUTION INTRAVENOUS ONCE
Refills: 0 | Status: DISCONTINUED | OUTPATIENT
Start: 2023-10-28 | End: 2023-10-28

## 2023-10-28 RX ORDER — MAGNESIUM SULFATE 500 MG/ML
1 VIAL (ML) INJECTION ONCE
Refills: 0 | Status: COMPLETED | OUTPATIENT
Start: 2023-10-28 | End: 2023-10-29

## 2023-10-28 RX ORDER — POTASSIUM PHOSPHATE, MONOBASIC POTASSIUM PHOSPHATE, DIBASIC 236; 224 MG/ML; MG/ML
30 INJECTION, SOLUTION INTRAVENOUS ONCE
Refills: 0 | Status: COMPLETED | OUTPATIENT
Start: 2023-10-28 | End: 2023-10-28

## 2023-10-28 RX ORDER — PANTOPRAZOLE SODIUM 20 MG/1
40 TABLET, DELAYED RELEASE ORAL DAILY
Refills: 0 | Status: DISCONTINUED | OUTPATIENT
Start: 2023-10-28 | End: 2023-11-01

## 2023-10-28 RX ORDER — POTASSIUM CHLORIDE 20 MEQ
10 PACKET (EA) ORAL
Refills: 0 | Status: DISCONTINUED | OUTPATIENT
Start: 2023-10-28 | End: 2023-10-28

## 2023-10-28 RX ORDER — POTASSIUM CHLORIDE 20 MEQ
40 PACKET (EA) ORAL ONCE
Refills: 0 | Status: COMPLETED | OUTPATIENT
Start: 2023-10-28 | End: 2023-10-28

## 2023-10-28 RX ORDER — POTASSIUM CHLORIDE 20 MEQ
10 PACKET (EA) ORAL
Refills: 0 | Status: COMPLETED | OUTPATIENT
Start: 2023-10-28 | End: 2023-10-28

## 2023-10-28 RX ORDER — ACETAMINOPHEN 500 MG
650 TABLET ORAL EVERY 6 HOURS
Refills: 0 | Status: DISCONTINUED | OUTPATIENT
Start: 2023-10-28 | End: 2023-11-01

## 2023-10-28 RX ADMIN — HEPARIN SODIUM 5000 UNIT(S): 5000 INJECTION INTRAVENOUS; SUBCUTANEOUS at 22:00

## 2023-10-28 RX ADMIN — PANTOPRAZOLE SODIUM 40 MILLIGRAM(S): 20 TABLET, DELAYED RELEASE ORAL at 12:16

## 2023-10-28 RX ADMIN — PANTOPRAZOLE SODIUM 40 MILLIGRAM(S): 20 TABLET, DELAYED RELEASE ORAL at 05:46

## 2023-10-28 RX ADMIN — PIPERACILLIN AND TAZOBACTAM 25 GRAM(S): 4; .5 INJECTION, POWDER, LYOPHILIZED, FOR SOLUTION INTRAVENOUS at 05:46

## 2023-10-28 RX ADMIN — SODIUM CHLORIDE 100 MILLILITER(S): 9 INJECTION, SOLUTION INTRAVENOUS at 17:20

## 2023-10-28 RX ADMIN — PIPERACILLIN AND TAZOBACTAM 25 GRAM(S): 4; .5 INJECTION, POWDER, LYOPHILIZED, FOR SOLUTION INTRAVENOUS at 17:20

## 2023-10-28 RX ADMIN — HEPARIN SODIUM 5000 UNIT(S): 5000 INJECTION INTRAVENOUS; SUBCUTANEOUS at 15:36

## 2023-10-28 RX ADMIN — SODIUM CHLORIDE 100 MILLILITER(S): 9 INJECTION, SOLUTION INTRAVENOUS at 05:46

## 2023-10-28 RX ADMIN — Medication 40 MILLIEQUIVALENT(S): at 18:01

## 2023-10-28 RX ADMIN — Medication 100 MILLIEQUIVALENT(S): at 23:27

## 2023-10-28 RX ADMIN — HEPARIN SODIUM 5000 UNIT(S): 5000 INJECTION INTRAVENOUS; SUBCUTANEOUS at 05:46

## 2023-10-28 RX ADMIN — Medication 100 MILLIEQUIVALENT(S): at 20:16

## 2023-10-28 RX ADMIN — POTASSIUM PHOSPHATE, MONOBASIC POTASSIUM PHOSPHATE, DIBASIC 83.33 MILLIMOLE(S): 236; 224 INJECTION, SOLUTION INTRAVENOUS at 08:20

## 2023-10-28 NOTE — PROGRESS NOTE ADULT - ASSESSMENT
34 yo with hx of covid in 2022 presents witth N/V   in settting of daily ETOH 3-4 drinks per day with   +THC    # JALEN resolved     monitor on LR   # resolved LA   # hypokalemia, hypophos     resolved and fu electrolytes   # esophageal thickening with     planned for esophagram today    will sign off, please call with questions               Improving

## 2023-10-28 NOTE — PROVIDER CONTACT NOTE (CRITICAL VALUE NOTIFICATION) - ACTION/TREATMENT ORDERED:
MD Barrios made aware. Potassium Phosphate tablet ordered at this time.
MD Barrios made aware. Orders already in for prior lab value
Critical K 2.9 will order K

## 2023-10-28 NOTE — CHART NOTE - NSCHARTNOTEFT_GEN_A_CORE
Unable to do esophagram today as d/w the radiologist on call  He suggested to do a contrast CT instead - ordered Hydroxychloroquine Pregnancy And Lactation Text: This medication has been shown to cause fetal harm but it isn't assigned a Pregnancy Risk Category. There are small amounts excreted in breast milk.

## 2023-10-28 NOTE — CHART NOTE - NSCHARTNOTEFT_GEN_A_CORE
CT w/ PO contrast demonstrates NO extravasation of contrast  No change in mediastinal air    Discussed with Dr Higinio LOCO for FLD today, advance as tolerated tomorrow    Order placed

## 2023-10-28 NOTE — PROGRESS NOTE ADULT - ASSESSMENT
32 y/o female presented after nausea, retching, multiple episodes of vomiting (at least 50 in previous 24 hrs per patient)   No fever, abd pain   Not a diabetic  Not pregnant   Uses edible marijuana and consumes ETOH (3-4 drinks 2-3 times/week)   Denies other drug use   Denies taking Tylenol or aspirin   No recent trauma       Found to have:  Prerenal JALEN with metabolic acidosis and hyperkalemia   Pneumomediastinum (likely from retching)  Leucocytosis, suspected sepsis (POA)       Improving       Plan:     Continue IVF   Renal fn, lytes improved  Acidosis resolved   D/c Montoya   Replete lytes, check BMP, PO4 in pm    Esophagram today - if negative will start po diet and change zosyn to po abx for a short course   WBC normal today  No fever     UDS + for THC   ETOH, APAP, salicylate neg   HbA1C 5.6    DVT ppx with sc heparin     PT eval as uses walker, cane at home (poor functional status since COVID in 2022)      Stable for floor  32 y/o female presented after nausea, retching, multiple episodes of vomiting (at least 50 in previous 24 hrs per patient)   No fever, abd pain   Not a diabetic  Not pregnant   Uses edible marijuana and consumes ETOH (3-4 drinks 2-3 times/week)   Denies other drug use   Denies taking Tylenol or aspirin   No recent trauma       Found to have:  Prerenal JALEN with metabolic acidosis and hyperkalemia   Pneumomediastinum (likely from retching)  Leucocytosis, suspected sepsis (POA)       Improving       Plan:     Continue IVF   Renal fn, lytes improved  Acidosis resolved   D/c Montoya   Replete lytes, check BMP, PO4 in pm    Esophagram today - if negative will start po diet and change zosyn to po abx for a short course   WBC normal today  No fever   PPI   Esophageal thickening on CT, may need EGD     UDS + for THC   ETOH, APAP, salicylate neg   HbA1C 5.6    DVT ppx with sc heparin     PT eval as uses walker, cane at home (poor functional status since COVID in 2022)      Stable for floor  32 y/o female presented after nausea, retching, multiple episodes of vomiting (at least 50 in previous 24 hrs per patient)   No fever, abd pain   Not a diabetic  Not pregnant   Uses edible marijuana and consumes ETOH (3-4 drinks 2-3 times/week)   Denies other drug use   Denies taking Tylenol or aspirin   No recent trauma       Found to have:  Prerenal JALEN with metabolic acidosis and hyperkalemia   Pneumomediastinum (likely from retching)  Leucocytosis, suspected sepsis (POA)       Improving       Plan:     Continue IVF   Renal fn, lytes improved  Acidosis resolved   D/c Montoya   Replete lytes, check BMP, PO4 in pm    Esophagram today - if negative will start po diet and change zosyn to po abx for a short course   WBC normal today  No fever   PPI   Esophageal thickening on CT, may need EGD     UDS + for THC   ETOH, APAP, salicylate neg   HbA1C 5.6    DVT ppx with sc heparin     PT eval as uses walker, cane at home (poor functional status since COVID in 2022)      Stable for floor, sign out given to Dr Ward at [id number]

## 2023-10-28 NOTE — PROGRESS NOTE ADULT - ASSESSMENT
A:    33yFemale  HD # 2    Here for:    1. Pneumomediastinum  2. Severe sepsis  3. JALEN  4. HAGMA  5. Hypokalemia    This patient requires a higher lvl of care for support of one or more vital organ systems with a high probability of imminent or life threatening deterioration in his/her condition    P:    Pneumomediastinum prompting CTS consult  JALEN, HAGMA, lactic acidosis, leucocytosis    Condition is improving  However, remains question of this pneumoperitoneum and question of if esophageal perforated    CTS recs are for contrast esophagram today  Continued active resucitation  NPO until this is done  Cont abx, medical mgmt  Replete potassium  VTE, PUD ppx  IVF    Dispo: Cont care.    TOTAL CRITICAL CARE TIME: 75+ minutes  (EXCLUSIVE of any non bundled procedures)    Note: This time spent INCLUDES time spent directly as this patient's bedside with evaluation, review of chart including review of laboratory and imaging studies, interpretation of vital signs and cardiac output measurements, any necessary ventilator management, and time spent discussing plan of care with patient and family, including goals of care discussion.

## 2023-10-29 LAB
ADD ON TEST-SPECIMEN IN LAB: SIGNIFICANT CHANGE UP
ADD ON TEST-SPECIMEN IN LAB: SIGNIFICANT CHANGE UP
ANION GAP SERPL CALC-SCNC: 11 MMOL/L — SIGNIFICANT CHANGE UP (ref 5–17)
ANION GAP SERPL CALC-SCNC: 11 MMOL/L — SIGNIFICANT CHANGE UP (ref 5–17)
BUN SERPL-MCNC: 2 MG/DL — LOW (ref 7–23)
BUN SERPL-MCNC: 2 MG/DL — LOW (ref 7–23)
CALCIUM SERPL-MCNC: 8.9 MG/DL — SIGNIFICANT CHANGE UP (ref 8.5–10.1)
CALCIUM SERPL-MCNC: 8.9 MG/DL — SIGNIFICANT CHANGE UP (ref 8.5–10.1)
CHLORIDE SERPL-SCNC: 109 MMOL/L — HIGH (ref 96–108)
CHLORIDE SERPL-SCNC: 109 MMOL/L — HIGH (ref 96–108)
CO2 SERPL-SCNC: 24 MMOL/L — SIGNIFICANT CHANGE UP (ref 22–31)
CO2 SERPL-SCNC: 24 MMOL/L — SIGNIFICANT CHANGE UP (ref 22–31)
CREAT SERPL-MCNC: 0.49 MG/DL — LOW (ref 0.5–1.3)
CREAT SERPL-MCNC: 0.49 MG/DL — LOW (ref 0.5–1.3)
EGFR: 128 ML/MIN/1.73M2 — SIGNIFICANT CHANGE UP
EGFR: 128 ML/MIN/1.73M2 — SIGNIFICANT CHANGE UP
GLUCOSE SERPL-MCNC: 113 MG/DL — HIGH (ref 70–99)
GLUCOSE SERPL-MCNC: 113 MG/DL — HIGH (ref 70–99)
HCT VFR BLD CALC: 29.2 % — LOW (ref 34.5–45)
HCT VFR BLD CALC: 29.2 % — LOW (ref 34.5–45)
HGB BLD-MCNC: 10.3 G/DL — LOW (ref 11.5–15.5)
HGB BLD-MCNC: 10.3 G/DL — LOW (ref 11.5–15.5)
MAGNESIUM SERPL-MCNC: 1.8 MG/DL — SIGNIFICANT CHANGE UP (ref 1.6–2.6)
MAGNESIUM SERPL-MCNC: 1.8 MG/DL — SIGNIFICANT CHANGE UP (ref 1.6–2.6)
MAGNESIUM SERPL-MCNC: 2.2 MG/DL — SIGNIFICANT CHANGE UP (ref 1.6–2.6)
MAGNESIUM SERPL-MCNC: 2.2 MG/DL — SIGNIFICANT CHANGE UP (ref 1.6–2.6)
MCHC RBC-ENTMCNC: 34.4 PG — HIGH (ref 27–34)
MCHC RBC-ENTMCNC: 34.4 PG — HIGH (ref 27–34)
MCHC RBC-ENTMCNC: 35.3 GM/DL — SIGNIFICANT CHANGE UP (ref 32–36)
MCHC RBC-ENTMCNC: 35.3 GM/DL — SIGNIFICANT CHANGE UP (ref 32–36)
MCV RBC AUTO: 97.7 FL — SIGNIFICANT CHANGE UP (ref 80–100)
MCV RBC AUTO: 97.7 FL — SIGNIFICANT CHANGE UP (ref 80–100)
PHOSPHATE SERPL-MCNC: 1.6 MG/DL — LOW (ref 2.5–4.5)
PHOSPHATE SERPL-MCNC: 1.6 MG/DL — LOW (ref 2.5–4.5)
PLATELET # BLD AUTO: 174 K/UL — SIGNIFICANT CHANGE UP (ref 150–400)
PLATELET # BLD AUTO: 174 K/UL — SIGNIFICANT CHANGE UP (ref 150–400)
POTASSIUM SERPL-MCNC: 3 MMOL/L — LOW (ref 3.5–5.3)
POTASSIUM SERPL-MCNC: 3 MMOL/L — LOW (ref 3.5–5.3)
POTASSIUM SERPL-SCNC: 3 MMOL/L — LOW (ref 3.5–5.3)
POTASSIUM SERPL-SCNC: 3 MMOL/L — LOW (ref 3.5–5.3)
RBC # BLD: 2.99 M/UL — LOW (ref 3.8–5.2)
RBC # BLD: 2.99 M/UL — LOW (ref 3.8–5.2)
RBC # FLD: 11.7 % — SIGNIFICANT CHANGE UP (ref 10.3–14.5)
RBC # FLD: 11.7 % — SIGNIFICANT CHANGE UP (ref 10.3–14.5)
SODIUM SERPL-SCNC: 144 MMOL/L — SIGNIFICANT CHANGE UP (ref 135–145)
SODIUM SERPL-SCNC: 144 MMOL/L — SIGNIFICANT CHANGE UP (ref 135–145)
WBC # BLD: 5.1 K/UL — SIGNIFICANT CHANGE UP (ref 3.8–10.5)
WBC # BLD: 5.1 K/UL — SIGNIFICANT CHANGE UP (ref 3.8–10.5)
WBC # FLD AUTO: 5.1 K/UL — SIGNIFICANT CHANGE UP (ref 3.8–10.5)
WBC # FLD AUTO: 5.1 K/UL — SIGNIFICANT CHANGE UP (ref 3.8–10.5)

## 2023-10-29 RX ORDER — POTASSIUM CHLORIDE 20 MEQ
20 PACKET (EA) ORAL
Refills: 0 | Status: COMPLETED | OUTPATIENT
Start: 2023-10-29 | End: 2023-10-29

## 2023-10-29 RX ORDER — DIPHENHYDRAMINE HYDROCHLORIDE AND LIDOCAINE HYDROCHLORIDE AND ALUMINUM HYDROXIDE AND MAGNESIUM HYDRO
10 KIT ONCE
Refills: 0 | Status: COMPLETED | OUTPATIENT
Start: 2023-10-29 | End: 2023-10-29

## 2023-10-29 RX ORDER — POTASSIUM CHLORIDE 20 MEQ
20 PACKET (EA) ORAL ONCE
Refills: 0 | Status: COMPLETED | OUTPATIENT
Start: 2023-10-29 | End: 2023-10-29

## 2023-10-29 RX ORDER — DIPHENHYDRAMINE HYDROCHLORIDE AND LIDOCAINE HYDROCHLORIDE AND ALUMINUM HYDROXIDE AND MAGNESIUM HYDRO
KIT
Refills: 0 | Status: DISCONTINUED | OUTPATIENT
Start: 2023-10-29 | End: 2023-10-30

## 2023-10-29 RX ORDER — DIPHENHYDRAMINE HYDROCHLORIDE AND LIDOCAINE HYDROCHLORIDE AND ALUMINUM HYDROXIDE AND MAGNESIUM HYDRO
10 KIT EVERY 4 HOURS
Refills: 0 | Status: DISCONTINUED | OUTPATIENT
Start: 2023-10-29 | End: 2023-10-30

## 2023-10-29 RX ORDER — SODIUM,POTASSIUM PHOSPHATES 278-250MG
1 POWDER IN PACKET (EA) ORAL EVERY 4 HOURS
Refills: 0 | Status: COMPLETED | OUTPATIENT
Start: 2023-10-29 | End: 2023-10-29

## 2023-10-29 RX ORDER — DIPHENHYDRAMINE HYDROCHLORIDE AND LIDOCAINE HYDROCHLORIDE AND ALUMINUM HYDROXIDE AND MAGNESIUM HYDRO
10 KIT EVERY 4 HOURS
Refills: 0 | Status: DISCONTINUED | OUTPATIENT
Start: 2023-10-29 | End: 2023-11-01

## 2023-10-29 RX ADMIN — Medication 20 MILLIEQUIVALENT(S): at 22:31

## 2023-10-29 RX ADMIN — Medication 20 MILLIEQUIVALENT(S): at 18:16

## 2023-10-29 RX ADMIN — PANTOPRAZOLE SODIUM 40 MILLIGRAM(S): 20 TABLET, DELAYED RELEASE ORAL at 10:03

## 2023-10-29 RX ADMIN — PIPERACILLIN AND TAZOBACTAM 25 GRAM(S): 4; .5 INJECTION, POWDER, LYOPHILIZED, FOR SOLUTION INTRAVENOUS at 09:50

## 2023-10-29 RX ADMIN — HEPARIN SODIUM 5000 UNIT(S): 5000 INJECTION INTRAVENOUS; SUBCUTANEOUS at 06:21

## 2023-10-29 RX ADMIN — HEPARIN SODIUM 5000 UNIT(S): 5000 INJECTION INTRAVENOUS; SUBCUTANEOUS at 22:31

## 2023-10-29 RX ADMIN — HEPARIN SODIUM 5000 UNIT(S): 5000 INJECTION INTRAVENOUS; SUBCUTANEOUS at 14:17

## 2023-10-29 RX ADMIN — SODIUM CHLORIDE 75 MILLILITER(S): 9 INJECTION, SOLUTION INTRAVENOUS at 10:06

## 2023-10-29 RX ADMIN — Medication 100 GRAM(S): at 03:20

## 2023-10-29 RX ADMIN — DIPHENHYDRAMINE HYDROCHLORIDE AND LIDOCAINE HYDROCHLORIDE AND ALUMINUM HYDROXIDE AND MAGNESIUM HYDRO 10 MILLILITER(S): KIT at 23:04

## 2023-10-29 RX ADMIN — Medication 1 PACKET(S): at 14:17

## 2023-10-29 RX ADMIN — DIPHENHYDRAMINE HYDROCHLORIDE AND LIDOCAINE HYDROCHLORIDE AND ALUMINUM HYDROXIDE AND MAGNESIUM HYDRO 10 MILLILITER(S): KIT at 14:16

## 2023-10-29 RX ADMIN — Medication 1 PACKET(S): at 22:32

## 2023-10-29 RX ADMIN — Medication 1 PACKET(S): at 18:16

## 2023-10-29 RX ADMIN — PIPERACILLIN AND TAZOBACTAM 25 GRAM(S): 4; .5 INJECTION, POWDER, LYOPHILIZED, FOR SOLUTION INTRAVENOUS at 18:16

## 2023-10-29 RX ADMIN — PIPERACILLIN AND TAZOBACTAM 25 GRAM(S): 4; .5 INJECTION, POWDER, LYOPHILIZED, FOR SOLUTION INTRAVENOUS at 01:51

## 2023-10-29 NOTE — PHYSICAL THERAPY INITIAL EVALUATION ADULT - ADDITIONAL COMMENTS
Pt lives with her  and father in law in a one level home with 3 CASTRO. Pt states she has a RW and cane at home but was not using them PTA.

## 2023-10-29 NOTE — PROGRESS NOTE ADULT - ASSESSMENT
A:    33yFemale  HD # 3    Here for:    1. Pneumomediastinum  2. Severe sepsis  3. JALEN  4. HAGMA  5. Hypokalemia    This patient requires a moderate lvl of care for mgmt of illness    P:    Pneumomediastinum prompting CTS consult  JALEN, HAGMA, lactic acidosis, leucocytosis    Condition continues to improve    CT PO contrast w/o [id number] yesterday 10/28, started on CLD, tolerating  From CTS standpoint, OK to advance diet as tolerated  Leukocytosis resolved  Afebrile  Abx per medicine  HypoK+/Ph++ being aggressively repleted  HAGMA resolved  JALEN improved  GI consult placed my medicine    Dispo: Cont care. Continue to follow. Remainder of care per medicine.     TOTAL CARE TIME: 35+ minutes  (EXCLUSIVE of any non bundled procedures)    Note: This time spent INCLUDES time spent directly as this patient's bedside with evaluation, review of chart including review of laboratory and imaging studies, interpretation of vital signs and cardiac output measurements, any necessary ventilator management, and time spent discussing plan of care with patient and family, including goals of care discussion.

## 2023-10-29 NOTE — PHYSICAL THERAPY INITIAL EVALUATION ADULT - MODALITIES TREATMENT COMMENTS
Pt asking to use RW for longer distances secondary to neuropathy and weakness. Pt ambulated with and without RW, slow but steady gait. RN aware. Pt is independent and does not need skilled PT at this time.

## 2023-10-29 NOTE — PROGRESS NOTE ADULT - ASSESSMENT
#probably gastritis/esohagitis  -ct ppi   -magic mouth wash- swiss and swollow     # hypokalemia replace it and monitor it     #Hypophosphatemia -replace it     #JALEN- resolved     #dvt pr

## 2023-10-29 NOTE — PROVIDER CONTACT NOTE (CRITICAL VALUE NOTIFICATION) - TEST AND RESULT REPORTED:
vbg BICARB-10
Critical K 2.9
Phosphate - 0.6
Phosphorous - 0.8
K 6.2, Co2 7, lactate 2.1
K+ 2.9
Bicarb ABG-8

## 2023-10-29 NOTE — PHYSICAL THERAPY INITIAL EVALUATION ADULT - GAIT DISTANCE, PT EVAL
A call was placed to Sheba Tori's mom, to advise that follow up with Dr. Alvarado is needed and schedule a holter/zio appointment.  There was no answer and a message with call back information was placed.   150 feet

## 2023-10-29 NOTE — PHYSICAL THERAPY INITIAL EVALUATION ADULT - PERTINENT HX OF CURRENT PROBLEM, REHAB EVAL
Pt is a 34 yo female admitted to  secondary to nausea and vomitting. No known PMH, as per patient patient has neuropathy in B feet. Pt agreeable to PT.

## 2023-10-30 LAB
ALBUMIN SERPL ELPH-MCNC: 3.5 G/DL — SIGNIFICANT CHANGE UP (ref 3.3–5)
ALBUMIN SERPL ELPH-MCNC: 3.5 G/DL — SIGNIFICANT CHANGE UP (ref 3.3–5)
ANION GAP SERPL CALC-SCNC: 8 MMOL/L — SIGNIFICANT CHANGE UP (ref 5–17)
ANION GAP SERPL CALC-SCNC: 8 MMOL/L — SIGNIFICANT CHANGE UP (ref 5–17)
BUN SERPL-MCNC: 2 MG/DL — LOW (ref 7–23)
BUN SERPL-MCNC: 2 MG/DL — LOW (ref 7–23)
CALCIUM SERPL-MCNC: 8.8 MG/DL — SIGNIFICANT CHANGE UP (ref 8.5–10.1)
CALCIUM SERPL-MCNC: 8.8 MG/DL — SIGNIFICANT CHANGE UP (ref 8.5–10.1)
CHLORIDE SERPL-SCNC: 108 MMOL/L — SIGNIFICANT CHANGE UP (ref 96–108)
CHLORIDE SERPL-SCNC: 108 MMOL/L — SIGNIFICANT CHANGE UP (ref 96–108)
CO2 SERPL-SCNC: 28 MMOL/L — SIGNIFICANT CHANGE UP (ref 22–31)
CO2 SERPL-SCNC: 28 MMOL/L — SIGNIFICANT CHANGE UP (ref 22–31)
CREAT SERPL-MCNC: 0.54 MG/DL — SIGNIFICANT CHANGE UP (ref 0.5–1.3)
CREAT SERPL-MCNC: 0.54 MG/DL — SIGNIFICANT CHANGE UP (ref 0.5–1.3)
EGFR: 125 ML/MIN/1.73M2 — SIGNIFICANT CHANGE UP
EGFR: 125 ML/MIN/1.73M2 — SIGNIFICANT CHANGE UP
GLUCOSE SERPL-MCNC: 121 MG/DL — HIGH (ref 70–99)
GLUCOSE SERPL-MCNC: 121 MG/DL — HIGH (ref 70–99)
PHOSPHATE SERPL-MCNC: 3.4 MG/DL — SIGNIFICANT CHANGE UP (ref 2.5–4.5)
PHOSPHATE SERPL-MCNC: 3.4 MG/DL — SIGNIFICANT CHANGE UP (ref 2.5–4.5)
POTASSIUM SERPL-MCNC: 3.2 MMOL/L — LOW (ref 3.5–5.3)
POTASSIUM SERPL-MCNC: 3.2 MMOL/L — LOW (ref 3.5–5.3)
POTASSIUM SERPL-SCNC: 3.2 MMOL/L — LOW (ref 3.5–5.3)
POTASSIUM SERPL-SCNC: 3.2 MMOL/L — LOW (ref 3.5–5.3)
SODIUM SERPL-SCNC: 144 MMOL/L — SIGNIFICANT CHANGE UP (ref 135–145)
SODIUM SERPL-SCNC: 144 MMOL/L — SIGNIFICANT CHANGE UP (ref 135–145)

## 2023-10-30 RX ORDER — POTASSIUM CHLORIDE 20 MEQ
40 PACKET (EA) ORAL EVERY 4 HOURS
Refills: 0 | Status: COMPLETED | OUTPATIENT
Start: 2023-10-30 | End: 2023-10-30

## 2023-10-30 RX ORDER — SUCRALFATE 1 G
1 TABLET ORAL
Refills: 0 | Status: DISCONTINUED | OUTPATIENT
Start: 2023-10-30 | End: 2023-11-01

## 2023-10-30 RX ADMIN — HEPARIN SODIUM 5000 UNIT(S): 5000 INJECTION INTRAVENOUS; SUBCUTANEOUS at 07:11

## 2023-10-30 RX ADMIN — Medication 40 MILLIEQUIVALENT(S): at 13:49

## 2023-10-30 RX ADMIN — Medication 1 GRAM(S): at 23:02

## 2023-10-30 RX ADMIN — DIPHENHYDRAMINE HYDROCHLORIDE AND LIDOCAINE HYDROCHLORIDE AND ALUMINUM HYDROXIDE AND MAGNESIUM HYDRO 10 MILLILITER(S): KIT at 07:10

## 2023-10-30 RX ADMIN — Medication 1 GRAM(S): at 18:05

## 2023-10-30 RX ADMIN — Medication 1 GRAM(S): at 12:05

## 2023-10-30 RX ADMIN — HEPARIN SODIUM 5000 UNIT(S): 5000 INJECTION INTRAVENOUS; SUBCUTANEOUS at 13:48

## 2023-10-30 RX ADMIN — PANTOPRAZOLE SODIUM 40 MILLIGRAM(S): 20 TABLET, DELAYED RELEASE ORAL at 12:05

## 2023-10-30 RX ADMIN — HEPARIN SODIUM 5000 UNIT(S): 5000 INJECTION INTRAVENOUS; SUBCUTANEOUS at 22:20

## 2023-10-30 RX ADMIN — DIPHENHYDRAMINE HYDROCHLORIDE AND LIDOCAINE HYDROCHLORIDE AND ALUMINUM HYDROXIDE AND MAGNESIUM HYDRO 10 MILLILITER(S): KIT at 10:01

## 2023-10-30 RX ADMIN — DIPHENHYDRAMINE HYDROCHLORIDE AND LIDOCAINE HYDROCHLORIDE AND ALUMINUM HYDROXIDE AND MAGNESIUM HYDRO 10 MILLILITER(S): KIT at 13:49

## 2023-10-30 RX ADMIN — DIPHENHYDRAMINE HYDROCHLORIDE AND LIDOCAINE HYDROCHLORIDE AND ALUMINUM HYDROXIDE AND MAGNESIUM HYDRO 10 MILLILITER(S): KIT at 22:19

## 2023-10-30 RX ADMIN — DIPHENHYDRAMINE HYDROCHLORIDE AND LIDOCAINE HYDROCHLORIDE AND ALUMINUM HYDROXIDE AND MAGNESIUM HYDRO 10 MILLILITER(S): KIT at 18:11

## 2023-10-30 RX ADMIN — Medication 2 PACKET(S): at 22:18

## 2023-10-30 RX ADMIN — PIPERACILLIN AND TAZOBACTAM 25 GRAM(S): 4; .5 INJECTION, POWDER, LYOPHILIZED, FOR SOLUTION INTRAVENOUS at 10:01

## 2023-10-30 RX ADMIN — PIPERACILLIN AND TAZOBACTAM 25 GRAM(S): 4; .5 INJECTION, POWDER, LYOPHILIZED, FOR SOLUTION INTRAVENOUS at 00:23

## 2023-10-30 RX ADMIN — Medication 40 MILLIEQUIVALENT(S): at 10:01

## 2023-10-30 NOTE — CONSULT NOTE ADULT - NS ATTEND AMEND GEN_ALL_CORE FT
33 year old woman with severe vomiting/retching, likely viral induced. Now better.     Esopahgitis and discomfort likely related to vomiting. She is improving.   Continue to advance diet.   PPI, carafate.   Outpatient follow up with Dr. Tate

## 2023-10-30 NOTE — CONSULT NOTE ADULT - ASSESSMENT
32 yo f, with severe N/V for 1 day w elevated K, Creat , bicarb 7  Received 2 L NS  Bicarb/ D50/ Insulin  Renal sono mild L hydro  CT stone hunt done- results pending  Feels a little better since received IVF  No flank pain, has generalized pain due to retching/ vomiting  no dysuria/ no diarrhea  Same happened 1 week ago transiently,  no flank pain  Did not eat or drink anything that could have started it  Not on any diets or weigh loss meds  On BC pills prn zyrtec    A/P  Severe dehydration  Acidosis  Lactic levels pending  JALEN/ Hyperkalemia  CT reading pending, no obvious stone or hydro, possible calcification mid R ureter, will await official reading  Bladder volume ~350 ml no calcification in bladder  add CPK   repeat labs  Dr Mercedes covering weekend  
34 yo female w/ no known PMHx presents to the ED BIBEMS from walk in clinic where she was evaluated for n/v. Pt woke up around 8am yesterday with nausea and persistent vomiting, and retching and felt she was having racing heart. Pt states she is having a hard time keeping food and liquid down. No recent sick contacts., denies diarrhea, no burning with urination. Pt is on birth control states she does not get her menstrual periods. Not on any other meds Noted for admission last year for ETOH abuse.  CT scan on this admission noted for Pneumomediastinum and esophageal thickening    Plan   NPO  Esophagram in am to r/o esophageal tear   Admit to ICU for JALEN , hypoNa, HypoKa+   will follow     HAYLEY Sylvester 
33 year old female with history etoh abuse presenting with nausea, vomiting and subsequent odynophagia    Imp: Odynophagia r/t esophagitis 2/2 vomiting  Clinically improved without further episodes of vomiting; HH and hemodynamics stable without melena do not suspect UGIB    Rec:  ::Carafate QID  ::PPI  ::Full liquids, adv to soft diet  ::No emergent EGD indicated  ::Would recommend followup outpatient and follow up EGD  ::To follow up with Dr. Temi Tate (her 's GI) outpatient

## 2023-10-30 NOTE — CONSULT NOTE ADULT - SUBJECTIVE AND OBJECTIVE BOX
Patient is a 33y old  Female who presents with a chief complaint of JALEN Hyperkalemia    HPI:  This is a 33 year old female with history etoh abuse presenting via ambulance from urgent care for intractable vomiting and nausea. Per patient awoke Thursday morning with nausea and had persistent episodes of vomiting non bloody emesis then retching with continued episodes over24 hours prompting presentation to urgent care clinic. There she stated he felt her heart racing, and without ability to maintain anything by mouth was then referred to ED. States day prior to event she had no appetite and did not eat anything. Denies any ill contacts, recent travel, consumption of raw or undercooked foods, etoh consumption, or new medications. Initial CT chest with pneumomediastinum questionable esoph perforation. CT with oral contrast without any extravasation, no esophageal perf; esophageal thickening consistent with esophagitis post vomiting.  Currently tolerating full liquids with mild pain localized to esophagus but without further vomiting. Last episode in ED on 10/27. Denies any abdominal or epigastric pain. Reports watery brown stools. Denies history EGD or colon.     PAST MEDICAL & SURGICAL HISTORY:  No pertinent past medical history    No significant past surgical history    MEDICATIONS  (STANDING):  FIRST- Mouthwash  BLM 10 milliLiter(s) Swish and Swallow every 4 hours  FIRST- Mouthwash  BLM      FIRST- Mouthwash  BLM 10 milliLiter(s) Swish and Swallow every 4 hours  heparin   Injectable 5000 Unit(s) SubCutaneous every 8 hours  influenza   Vaccine 0.5 milliLiter(s) IntraMuscular once  pantoprazole  Injectable 40 milliGRAM(s) IV Push daily  piperacillin/tazobactam IVPB.. 3.375 Gram(s) IV Intermittent every 8 hours  potassium chloride   Powder 40 milliEquivalent(s) Oral every 4 hours  potassium phosphate / sodium phosphate Powder (PHOS-NaK) 2 Packet(s) Oral once  sucralfate suspension 1 Gram(s) Oral four times a day    MEDICATIONS  (PRN):  acetaminophen     Tablet .. 650 milliGRAM(s) Oral every 6 hours PRN Temp greater or equal to 38C (100.4F), Mild Pain (1 - 3)  aluminum hydroxide/magnesium hydroxide/simethicone Suspension 30 milliLiter(s) Oral every 4 hours PRN Dyspepsia  ondansetron Injectable 4 milliGRAM(s) IV Push every 6 hours PRN Nausea and/or Vomiting    Allergies    No Known Allergies    Intolerances    Gluten (Unknown)  lactose (Unknown)    SOCIAL HISTORY:    FAMILY HISTORY:  No pertinent family history in first degree relatives    REVIEW OF SYSTEMS:    CONSTITUTIONAL: No weakness, fevers or chills  EYES/ENT: No visual changes;  No vertigo or throat pain   NECK: No pain or stiffness  RESPIRATORY: No cough, wheezing, hemoptysis; No shortness of breath  CARDIOVASCULAR: No chest pain or palpitations  GASTROINTESTINAL: See HPI  GENITOURINARY: No dysuria, frequency or hematuria  NEUROLOGICAL: No numbness or weakness  SKIN: No itching, burning, rashes, or lesions   PSYCH: Normal mood and affect  All other review of systems is negative unless indicated above.    Vital Signs Last 24 Hrs  T(C): 37.2 (30 Oct 2023 07:52), Max: 37.2 (30 Oct 2023 07:52)  T(F): 99 (30 Oct 2023 07:52), Max: 99 (30 Oct 2023 07:52)  HR: 84 (30 Oct 2023 07:52) (82 - 92)  BP: 132/82 (30 Oct 2023 07:52) (118/83 - 132/82)  BP(mean): 93 (29 Oct 2023 22:28) (93 - 93)  RR: 17 (30 Oct 2023 07:52) (17 - 18)  SpO2: 100% (30 Oct 2023 07:52) (97% - 100%)    Parameters below as of 30 Oct 2023 07:52  Patient On (Oxygen Delivery Method): room air    PHYSICAL EXAM:    Constitutional: No acute distress, well-developed, non-toxic appearing  HEENT: masked, good phonation, not icteric  Neck: supple, no lymphadenopathy  Respiratory: clear to ascultation bilaterally, no wheezing  Cardiovascular: S1 and S2, regular rate and rhythm, no murmurs rubs or gallops  Gastrointestinal: soft, non-tender, non-distended, +bowel sounds, no rebound or guarding, no surgical scars, no drains  Extremities: No peripheral edema, no cyanosis or clubbing  Vascular: 2+ peripheral pulses, no venous stasis  Neurological: A/O x 3, no focal deficits, no asterixis  Psychiatric: Normal mood, normal affect  Skin: No rashes, not jaundiced    LABS:                        10.3   5.10  )-----------( 174      ( 29 Oct 2023 07:55 )             29.2     10-30    144  |  108  |  2<L>  ----------------------------<  121<H>  3.2<L>   |  28  |  0.54    Ca    8.8      30 Oct 2023 08:19  Phos  3.4     10-30  Mg     2.2     10-29    TPro  x   /  Alb  3.5  /  TBili  x   /  DBili  x   /  AST  x   /  ALT  x   /  AlkPhos  x   10-30      LIVER FUNCTIONS - ( 30 Oct 2023 08:19 )  Alb: 3.5 g/dL / Pro: x     / ALK PHOS: x     / ALT: x     / AST: x     / GGT: x             RADIOLOGY & ADDITIONAL STUDIES:  FINDINGS:    Tubes/Lines: None.    Lungs, airways and pleura: The lungs are clear. The airways are normal.   Scant bilateral pleural effusions. No pneumothorax..    Mediastinum: Since 10/27/2023, the pneumomediastinum is unchanged   allowing for differences in technique.    Oral contrast is in the esophagus. No extravasation of oral contrast is   shown from the opacified segments of the esophagus.    The visualized thyroid gland is unremarkable. The heart is normal in   size. The aorta is normal in caliber. No pericardial effusion.    Upper Abdomen: The upper abdomen is normal.    Bones And Soft Tissues: The bones are unremarkable.  The soft tissues are   unremarkable.      IMPRESSION:    1.  No oral contrast is shown to have extravasated from the opacified   segments of the esophagus.  2.  No change in the pneumomediastinum allowing for differences in   technique.  3.  Scant bilateral pleural effusions. Patient is a 33y old  Female who presents with a chief complaint of JALEN Hyperkalemia, vomiting     33 year old female with history etoh abuse presenting via ambulance from urgent care for intractable vomiting and nausea.    Patient states that at the end of last week had sudden onset of perstistent nausea, vomiting and retching. This was ongonig for over a day. Denies any diarrhea. No abdominal pain, just soreness from vomiting. Denies any sick contacts or travel history. No eating raw or undercooked meat. Went to an urgent care clinic and sent to ED. Initial CT chest with ? pneumomediastinum questionable esophageal perforation. CT with oral contrast without any extravasation, no esophageal perf; esophageal thickening consistent with esophagitis post vomiting and no concern for perforation. Currently tolerating full liquids with mild pain localized to chest when swallowing but without further vomiting. Last episode in ED on 10/27. Denies any abdominal or epigastric pain. Reports watery brown stools. Denies history EGD or colon.     PAST MEDICAL & SURGICAL HISTORY:  ETOH abuse    No significant past surgical history    MEDICATIONS  (STANDING):  FIRST- Mouthwash  BLM 10 milliLiter(s) Swish and Swallow every 4 hours  FIRST- Mouthwash  BLM      FIRST- Mouthwash  BLM 10 milliLiter(s) Swish and Swallow every 4 hours  heparin   Injectable 5000 Unit(s) SubCutaneous every 8 hours  influenza   Vaccine 0.5 milliLiter(s) IntraMuscular once  pantoprazole  Injectable 40 milliGRAM(s) IV Push daily  piperacillin/tazobactam IVPB.. 3.375 Gram(s) IV Intermittent every 8 hours  potassium chloride   Powder 40 milliEquivalent(s) Oral every 4 hours  potassium phosphate / sodium phosphate Powder (PHOS-NaK) 2 Packet(s) Oral once  sucralfate suspension 1 Gram(s) Oral four times a day    MEDICATIONS  (PRN):  acetaminophen     Tablet .. 650 milliGRAM(s) Oral every 6 hours PRN Temp greater or equal to 38C (100.4F), Mild Pain (1 - 3)  aluminum hydroxide/magnesium hydroxide/simethicone Suspension 30 milliLiter(s) Oral every 4 hours PRN Dyspepsia  ondansetron Injectable 4 milliGRAM(s) IV Push every 6 hours PRN Nausea and/or Vomiting    Allergies    No Known Allergies    Intolerances    Gluten (Unknown)  lactose (Unknown)    SOCIAL HISTORY:  no smoking, no drugs, ETOH    FAMILY HISTORY:  no colon or stomach cancer    REVIEW OF SYSTEMS:    CONSTITUTIONAL: No weakness, fevers or chills  EYES/ENT: No visual changes;  No vertigo or throat pain   NECK: No pain or stiffness  RESPIRATORY: No cough, wheezing, hemoptysis; No shortness of breath  CARDIOVASCULAR: No chest pain or palpitations  GASTROINTESTINAL: See HPI  GENITOURINARY: No dysuria, frequency or hematuria  NEUROLOGICAL: No numbness or weakness  SKIN: No itching, burning, rashes, or lesions   PSYCH: Normal mood and affect  All other review of systems is negative unless indicated above.    Vital Signs Last 24 Hrs  T(C): 37.2 (30 Oct 2023 07:52), Max: 37.2 (30 Oct 2023 07:52)  T(F): 99 (30 Oct 2023 07:52), Max: 99 (30 Oct 2023 07:52)  HR: 84 (30 Oct 2023 07:52) (82 - 92)  BP: 132/82 (30 Oct 2023 07:52) (118/83 - 132/82)  BP(mean): 93 (29 Oct 2023 22:28) (93 - 93)  RR: 17 (30 Oct 2023 07:52) (17 - 18)  SpO2: 100% (30 Oct 2023 07:52) (97% - 100%)    Parameters below as of 30 Oct 2023 07:52  Patient On (Oxygen Delivery Method): room air    PHYSICAL EXAM:    Constitutional: No acute distress, well-developed, non-toxic appearing  HEENT: unmasked, good phonation, not icteric  Neck: supple, no lymphadenopathy  Respiratory: clear to ascultation bilaterally, no wheezing  Cardiovascular: S1 and S2, regular rate and rhythm, no murmurs rubs or gallops  Gastrointestinal: soft, non-tender, non-distended, +bowel sounds, no rebound or guarding, no surgical scars, no drains  Extremities: No peripheral edema, no cyanosis or clubbing  Vascular: 2+ peripheral pulses, no venous stasis  Neurological: A/O x 3, no focal deficits, no asterixis  Psychiatric: Normal mood, normal affect  Skin: No rashes, not jaundiced    LABS:                        10.3   5.10  )-----------( 174      ( 29 Oct 2023 07:55 )             29.2     10-30    144  |  108  |  2<L>  ----------------------------<  121<H>  3.2<L>   |  28  |  0.54    Ca    8.8      30 Oct 2023 08:19  Phos  3.4     10-30  Mg     2.2     10-29    TPro  x   /  Alb  3.5  /  TBili  x   /  DBili  x   /  AST  x   /  ALT  x   /  AlkPhos  x   10-30      LIVER FUNCTIONS - ( 30 Oct 2023 08:19 )  Alb: 3.5 g/dL / Pro: x     / ALK PHOS: x     / ALT: x     / AST: x     / GGT: x             RADIOLOGY & ADDITIONAL STUDIES:  FINDINGS:    Tubes/Lines: None.    Lungs, airways and pleura: The lungs are clear. The airways are normal.   Scant bilateral pleural effusions. No pneumothorax..    Mediastinum: Since 10/27/2023, the pneumomediastinum is unchanged   allowing for differences in technique.    Oral contrast is in the esophagus. No extravasation of oral contrast is   shown from the opacified segments of the esophagus.    The visualized thyroid gland is unremarkable. The heart is normal in   size. The aorta is normal in caliber. No pericardial effusion.    Upper Abdomen: The upper abdomen is normal.    Bones And Soft Tissues: The bones are unremarkable.  The soft tissues are   unremarkable.      IMPRESSION:    1.  No oral contrast is shown to have extravasated from the opacified   segments of the esophagus.  2.  No change in the pneumomediastinum allowing for differences in   technique.  3.  Scant bilateral pleural effusions.

## 2023-10-31 ENCOUNTER — TRANSCRIPTION ENCOUNTER (OUTPATIENT)
Age: 33
End: 2023-10-31

## 2023-10-31 LAB
ANION GAP SERPL CALC-SCNC: 9 MMOL/L — SIGNIFICANT CHANGE UP (ref 5–17)
ANION GAP SERPL CALC-SCNC: 9 MMOL/L — SIGNIFICANT CHANGE UP (ref 5–17)
BUN SERPL-MCNC: 5 MG/DL — LOW (ref 7–23)
BUN SERPL-MCNC: 5 MG/DL — LOW (ref 7–23)
CALCIUM SERPL-MCNC: 9.1 MG/DL — SIGNIFICANT CHANGE UP (ref 8.5–10.1)
CALCIUM SERPL-MCNC: 9.1 MG/DL — SIGNIFICANT CHANGE UP (ref 8.5–10.1)
CHLORIDE SERPL-SCNC: 109 MMOL/L — HIGH (ref 96–108)
CHLORIDE SERPL-SCNC: 109 MMOL/L — HIGH (ref 96–108)
CO2 SERPL-SCNC: 23 MMOL/L — SIGNIFICANT CHANGE UP (ref 22–31)
CO2 SERPL-SCNC: 23 MMOL/L — SIGNIFICANT CHANGE UP (ref 22–31)
CREAT SERPL-MCNC: 0.58 MG/DL — SIGNIFICANT CHANGE UP (ref 0.5–1.3)
CREAT SERPL-MCNC: 0.58 MG/DL — SIGNIFICANT CHANGE UP (ref 0.5–1.3)
EGFR: 122 ML/MIN/1.73M2 — SIGNIFICANT CHANGE UP
EGFR: 122 ML/MIN/1.73M2 — SIGNIFICANT CHANGE UP
GLUCOSE SERPL-MCNC: 135 MG/DL — HIGH (ref 70–99)
GLUCOSE SERPL-MCNC: 135 MG/DL — HIGH (ref 70–99)
POTASSIUM SERPL-MCNC: 3.8 MMOL/L — SIGNIFICANT CHANGE UP (ref 3.5–5.3)
POTASSIUM SERPL-MCNC: 3.8 MMOL/L — SIGNIFICANT CHANGE UP (ref 3.5–5.3)
POTASSIUM SERPL-SCNC: 3.8 MMOL/L — SIGNIFICANT CHANGE UP (ref 3.5–5.3)
POTASSIUM SERPL-SCNC: 3.8 MMOL/L — SIGNIFICANT CHANGE UP (ref 3.5–5.3)
SODIUM SERPL-SCNC: 141 MMOL/L — SIGNIFICANT CHANGE UP (ref 135–145)
SODIUM SERPL-SCNC: 141 MMOL/L — SIGNIFICANT CHANGE UP (ref 135–145)

## 2023-10-31 RX ORDER — DIPHENHYDRAMINE HYDROCHLORIDE AND LIDOCAINE HYDROCHLORIDE AND ALUMINUM HYDROXIDE AND MAGNESIUM HYDRO
10 KIT
Qty: 400 | Refills: 0
Start: 2023-10-31 | End: 2023-11-09

## 2023-10-31 RX ORDER — PANTOPRAZOLE SODIUM 20 MG/1
1 TABLET, DELAYED RELEASE ORAL
Qty: 10 | Refills: 0
Start: 2023-10-31 | End: 2023-11-09

## 2023-10-31 RX ORDER — SUCRALFATE 1 G
10 TABLET ORAL
Qty: 400 | Refills: 0
Start: 2023-10-31 | End: 2023-11-09

## 2023-10-31 RX ADMIN — DIPHENHYDRAMINE HYDROCHLORIDE AND LIDOCAINE HYDROCHLORIDE AND ALUMINUM HYDROXIDE AND MAGNESIUM HYDRO 10 MILLILITER(S): KIT at 18:25

## 2023-10-31 RX ADMIN — HEPARIN SODIUM 5000 UNIT(S): 5000 INJECTION INTRAVENOUS; SUBCUTANEOUS at 22:31

## 2023-10-31 RX ADMIN — HEPARIN SODIUM 5000 UNIT(S): 5000 INJECTION INTRAVENOUS; SUBCUTANEOUS at 13:16

## 2023-10-31 RX ADMIN — DIPHENHYDRAMINE HYDROCHLORIDE AND LIDOCAINE HYDROCHLORIDE AND ALUMINUM HYDROXIDE AND MAGNESIUM HYDRO 10 MILLILITER(S): KIT at 05:32

## 2023-10-31 RX ADMIN — Medication 1 GRAM(S): at 05:31

## 2023-10-31 RX ADMIN — DIPHENHYDRAMINE HYDROCHLORIDE AND LIDOCAINE HYDROCHLORIDE AND ALUMINUM HYDROXIDE AND MAGNESIUM HYDRO 10 MILLILITER(S): KIT at 03:27

## 2023-10-31 RX ADMIN — PANTOPRAZOLE SODIUM 40 MILLIGRAM(S): 20 TABLET, DELAYED RELEASE ORAL at 13:05

## 2023-10-31 RX ADMIN — Medication 1 GRAM(S): at 13:06

## 2023-10-31 RX ADMIN — DIPHENHYDRAMINE HYDROCHLORIDE AND LIDOCAINE HYDROCHLORIDE AND ALUMINUM HYDROXIDE AND MAGNESIUM HYDRO 10 MILLILITER(S): KIT at 10:36

## 2023-10-31 RX ADMIN — Medication 1 GRAM(S): at 18:25

## 2023-10-31 RX ADMIN — DIPHENHYDRAMINE HYDROCHLORIDE AND LIDOCAINE HYDROCHLORIDE AND ALUMINUM HYDROXIDE AND MAGNESIUM HYDRO 10 MILLILITER(S): KIT at 22:31

## 2023-10-31 RX ADMIN — HEPARIN SODIUM 5000 UNIT(S): 5000 INJECTION INTRAVENOUS; SUBCUTANEOUS at 05:32

## 2023-10-31 RX ADMIN — DIPHENHYDRAMINE HYDROCHLORIDE AND LIDOCAINE HYDROCHLORIDE AND ALUMINUM HYDROXIDE AND MAGNESIUM HYDRO 10 MILLILITER(S): KIT at 13:16

## 2023-10-31 NOTE — PROGRESS NOTE ADULT - ASSESSMENT
34 yo female w/ no known PMHx presented to the ED BIBEMS from walk in clinic where she was evaluated for n/v and retching.  CT scan on this admission noted for Pneumomediastinum and esophageal thickening for which Thoracic surgery was consulted.  CT chest with oral contrast was negative for extravasation.    PLAN  - Continue Soft diet as tolerates  - remains on antibiotics per primary team  - No further intervention needed at this time  - will continue to follow until patient tolerating regular diet    Discussed with Cardiothoracic Team at AM rounds.

## 2023-10-31 NOTE — DISCHARGE NOTE PROVIDER - CARE PROVIDER_API CALL
Yomaira Stevens  Internal Medicine  [address] [place] [address]  Phone: [phone]  Fax: [phone]  Established Patient  Follow Up Time: 1-3 days    Temi Tate  Gastroenterology  [address] [place] [address]  Phone: [phone]  Fax: [phone]  Follow Up Time: 1 week

## 2023-10-31 NOTE — DISCHARGE NOTE NURSING/CASE MANAGEMENT/SOCIAL WORK - PATIENT PORTAL LINK FT
You can access the FollowMyHealth Patient Portal offered by [facility] by registering at the following website: [email or web address]. By joining [email or web address] FollowMyHealth portal, you will also be able to view your health information using other applications (apps) compatible with our system.

## 2023-10-31 NOTE — DISCHARGE NOTE NURSING/CASE MANAGEMENT/SOCIAL WORK - NSDCPEFALRISK_GEN_ALL_CORE
For information on Fall & Injury Prevention, visit: [email or web address] OR  [email or web address] OR  https://www.cdc.gov/steadi/patient.html

## 2023-10-31 NOTE — DISCHARGE NOTE PROVIDER - NSDCCAREPROVSEEN_GEN_ALL_CORE_FT
Lebron, Bryce Ward, Mariely Jay, Gildardo Figueredo, Bryce Nguyen, Jared Izquierdo, Kell Mercedes, Vicki Amaro, Xander Baron, Sudhir Zuñiga, Jemima Galaviz, Cali Sylvester, Crow Arnett, Hugo Lundberg, [facility]

## 2023-10-31 NOTE — DISCHARGE NOTE PROVIDER - ATTENDING DISCHARGE PHYSICAL EXAMINATION:
T(C): 36.8 (10-31-23 @ 15:36), Max: 36.8 (10-30-23 @ 21:15)  HR: 101 (10-31-23 @ 15:36) (97 - 101)  BP: 124/94 (10-31-23 @ 15:36) (113/81 - 124/94)  RR: 18 (10-31-23 @ 15:36) (16 - 18)  SpO2: 100% (10-31-23 @ 15:36) (98% - 100%)    CONSTITUTIONAL: Well groomed, no apparent distress  EYES: EOMI, No conjunctival or scleral injection, non-icteric  ENMT: Oral mucosa with moist membranes. Normal dentition; no pharyngeal injection or exudates             NECK: Supple, symmetric and without tracheal deviation   RESP: No respiratory distress, no use of accessory muscles; CTA b/l, no WRR  CV: RRR, +S1S2, no MRG; no JVD; no peripheral edema  GI: Soft, NT, ND, no rebound, no guarding; no palpable masses; no hepatosplenomegaly; no hernia palpated  LYMPH: No cervical LAD   SKIN: No rashes or ulcers noted; no subcutaneous nodules or induration palpable  NEURO: CN II-XII intact; normal reflexes in upper and lower extremities, sensation intact in upper and lower extremities b/l to light touch   PSYCH: Appropriate insight/judgment; A+O x 3, mood and affect appropriate, recent/remote memory intact

## 2023-10-31 NOTE — DISCHARGE NOTE PROVIDER - NSDCCPCAREPLAN_GEN_ALL_CORE_FT
PRINCIPAL DISCHARGE DIAGNOSIS  Diagnosis: Pneumomediastinum  Assessment and Plan of Treatment: Stable      SECONDARY DISCHARGE DIAGNOSES  Diagnosis: Acute renal failure  Assessment and Plan of Treatment: Resolved    Diagnosis: Esophagitis  Assessment and Plan of Treatment: Carafate / PPI / Magic Mouthwash

## 2023-10-31 NOTE — DISCHARGE NOTE PROVIDER - PROVIDER TOKENS
PROVIDER:[TOKEN:[[id number]],FOLLOWUP:[1-3 days],ESTABLISHEDPATIENT:[T]],PROVIDER:[TOKEN:[[id number]],FOLLOWUP:[1 week]]

## 2023-10-31 NOTE — DISCHARGE NOTE PROVIDER - HOSPITAL COURSE
Patient is a 33 y.o. female who presented with nausea and vomiting with no known PMH admitted for Patient is a 33 y.o. female who presented with nausea and vomiting with no known PMH admitted for JALEN, hyponatremia, hyperkalemia, metabolic acidosis and pneumomediastinum to critical care and with history of previous admission to hospital for alcohol use disorder.  JALEN, hyponatremia, metabolic acidosis and hyperka Patient is a 33 y.o. female who presented with nausea and vomiting with no known PMH admitted for JALEN, hyponatremia, hyperkalemia, metabolic acidosis and pneumomediastinum to critical care and with history of previous admission to hospital for alcohol use disorder.  JALEN, hyponatremia, metabolic acidosis and hyperkalemia resolved over the course of the hospitalization where patient developed hypokalemia which resolved after replenishment. Patient seen by CardioThoracic and GI who recommends an outpatient follow up with EGD and discharge on PO Carafate and PPI as patient was started on clears and advanced to soft diet after CT with PO contrast did not reveal extravasation.     < from: CT Chest w/ Oral Cont (10.28.23 @ 12:04) >    IMPRESSION:    1.  No oral contrast is shown to have extravasated from the opacified   segments of the esophagus.  2.  No change in the pneumomediastinum allowing for differences in   technique.  3.  Scant bilateral pleural effusions.    --- End of Report ---    < end of copied text >    < from: CT Abdomen and Pelvis No Cont (10.27.23 @ 15:26) >    IMPRESSION:  Apparent partially imaged pneumomediastinum adjacent to the distal   esophagus and thoracic descending aorta. Mild distal esophageal mural   thickening. Possibility of esophageal perforation cannot be excluded.    No evidence for a urinary calculus. No hydronephrosis.    Dr. Chicas isinformed with read back.    --- End of Report ---    < end of copied text >

## 2023-10-31 NOTE — DISCHARGE NOTE PROVIDER - NSDCMRMEDTOKEN_GEN_ALL_CORE_FT
Multiple Vitamins oral tablet: 1 tab(s) orally once a day  Slynd 4 mg oral tablet: 1 tab(s) orally once a day  Vitamin B12 1000 mcg oral tablet: 1 tab(s) orally once a day  Vitamin D3 125 mcg (5000 intl units) oral capsule: 1 cap(s) orally once a day  ZyrTEC 10 mg oral tablet: 1 tab(s) orally once a day   aluminum hydroxide-magnesium hydroxide 200 mg-200 mg/5 mL oral suspension: 30 milliliter(s) orally every 4 hours as needed for Dyspepsia  diphenhydramine/lidocaine/aluminum hydroxide/magnesium hydroxide/simethicone mucous membrane suspension: 10 milliliter(s) mucous membrane 4 times a day as needed for  mild pain  Multiple Vitamins oral tablet: 1 tab(s) orally once a day  Protonix 40 mg oral delayed release tablet: 1 tab(s) orally once a day  Slynd 4 mg oral tablet: 1 tab(s) orally once a day  sucralfate 1 g/10 mL oral suspension: 10 milliliter(s) orally 4 times a day  Vitamin B12 1000 mcg oral tablet: 1 tab(s) orally once a day  Vitamin D3 125 mcg (5000 intl units) oral capsule: 1 cap(s) orally once a day  ZyrTEC 10 mg oral tablet: 1 tab(s) orally once a day

## 2023-11-01 VITALS
RESPIRATION RATE: 18 BRPM | SYSTOLIC BLOOD PRESSURE: 115 MMHG | HEART RATE: 98 BPM | OXYGEN SATURATION: 98 % | TEMPERATURE: 98 F | DIASTOLIC BLOOD PRESSURE: 86 MMHG

## 2023-11-01 RX ADMIN — Medication 1 GRAM(S): at 10:44

## 2023-11-01 RX ADMIN — DIPHENHYDRAMINE HYDROCHLORIDE AND LIDOCAINE HYDROCHLORIDE AND ALUMINUM HYDROXIDE AND MAGNESIUM HYDRO 10 MILLILITER(S): KIT at 10:45

## 2023-11-01 RX ADMIN — PANTOPRAZOLE SODIUM 40 MILLIGRAM(S): 20 TABLET, DELAYED RELEASE ORAL at 10:45

## 2023-11-01 RX ADMIN — Medication 1 GRAM(S): at 17:35

## 2023-11-01 RX ADMIN — DIPHENHYDRAMINE HYDROCHLORIDE AND LIDOCAINE HYDROCHLORIDE AND ALUMINUM HYDROXIDE AND MAGNESIUM HYDRO 10 MILLILITER(S): KIT at 05:47

## 2023-11-01 RX ADMIN — DIPHENHYDRAMINE HYDROCHLORIDE AND LIDOCAINE HYDROCHLORIDE AND ALUMINUM HYDROXIDE AND MAGNESIUM HYDRO 10 MILLILITER(S): KIT at 17:35

## 2023-11-01 RX ADMIN — HEPARIN SODIUM 5000 UNIT(S): 5000 INJECTION INTRAVENOUS; SUBCUTANEOUS at 05:47

## 2023-11-01 RX ADMIN — Medication 1 GRAM(S): at 05:47

## 2023-11-01 RX ADMIN — DIPHENHYDRAMINE HYDROCHLORIDE AND LIDOCAINE HYDROCHLORIDE AND ALUMINUM HYDROXIDE AND MAGNESIUM HYDRO 10 MILLILITER(S): KIT at 13:57

## 2023-11-01 RX ADMIN — Medication 1 GRAM(S): at 01:21

## 2023-11-01 NOTE — PROGRESS NOTE ADULT - SUBJECTIVE AND OBJECTIVE BOX
Subjective:  Pt seen, states discomfort much improved w swallowing. Seen eating breakfast.     Vital Signs:  Vital Signs Last 24 Hrs  T(C): 36.9 (11-01-23 @ 07:47), Max: 36.9 (11-01-23 @ 07:47)  T(F): 98.4 (11-01-23 @ 07:47), Max: 98.4 (11-01-23 @ 07:47)  HR: 98 (11-01-23 @ 07:47) (97 - 101)  BP: 115/86 (11-01-23 @ 07:47) (115/86 - 124/94)  RR: 18 (11-01-23 @ 07:47) (18 - 18)  SpO2: 98% (11-01-23 @ 07:47) (98% - 100%) on (O2)    Telemetry/Alarms:    Relevant labs, radiology and Medications reviewed    10-31    141  |  109<H>  |  5<L>  ----------------------------<  135<H>  3.8   |  23  |  0.58    Ca    9.1      31 Oct 2023 12:57        MEDICATIONS  (STANDING):  FIRST- Mouthwash  BLM 10 milliLiter(s) Swish and Swallow every 4 hours  heparin   Injectable 5000 Unit(s) SubCutaneous every 8 hours  influenza   Vaccine 0.5 milliLiter(s) IntraMuscular once  pantoprazole  Injectable 40 milliGRAM(s) IV Push daily  sucralfate suspension 1 Gram(s) Oral four times a day    MEDICATIONS  (PRN):  acetaminophen     Tablet .. 650 milliGRAM(s) Oral every 6 hours PRN Temp greater or equal to 38C (100.4F), Mild Pain (1 - 3)  aluminum hydroxide/magnesium hydroxide/simethicone Suspension 30 milliLiter(s) Oral every 4 hours PRN Dyspepsia  ondansetron Injectable 4 milliGRAM(s) IV Push every 6 hours PRN Nausea and/or Vomiting      Physical exam  Gen NAD  Neuro AAOx3  Card RRR  Pulm equal expansion  Abd soft  Ext warm        I&O's Summary      Assessment  33y Female  w/ PAST MEDICAL & SURGICAL HISTORY:  No pertinent past medical history      No significant past surgical history      admitted with complaints of Patient is a 33y old  Female who presents with a chief complaint of Nausea & vomiting (31 Oct 2023 17:28)  34 yo female w/ no known PMHx presented to the ED BIBEMS from walk in clinic where she was evaluated for n/v and retching.  CT scan on this admission noted for Pneumomediastinum and esophageal thickening for which Thoracic surgery was consulted.  CT chest with oral contrast was negative for extravasation. Esophagram neg for leak.    PLAN  - Continue diet as tolerates  - No further intervention needed at this time  - pt for discharge today  can f/u PRN, card w Dr. Figueredo info left w pt      Discussed with Cardiothoracic Team at AM rounds.  
   Subjective:  Patient seen and examined, tolerating liquid diet.  Continues to c/o chest pressure.    Vital Signs:  Vital Signs Last 24 Hrs  T(C): 37.2 (10-30-23 @ 07:52), Max: 37.2 (10-30-23 @ 07:52)  T(F): 99 (10-30-23 @ 07:52), Max: 99 (10-30-23 @ 07:52)  HR: 84 (10-30-23 @ 07:52) (82 - 92)  BP: 132/82 (10-30-23 @ 07:52) (118/83 - 132/82)  RR: 17 (10-30-23 @ 07:52) (17 - 18)  SpO2: 100% (10-30-23 @ 07:52) (97% - 100%) on (O2)    Telemetry/Alarms:    Relevant labs, radiology and Medications reviewed                        10.3   5.10  )-----------( 174      ( 29 Oct 2023 07:55 )             29.2     10-30    144  |  108  |  2<L>  ----------------------------<  121<H>  3.2<L>   |  28  |  0.54    Ca    8.8      30 Oct 2023 08:19  Phos  3.4     10-30  Mg     2.2     10-29    TPro  x   /  Alb  3.5  /  TBili  x   /  DBili  x   /  AST  x   /  ALT  x   /  AlkPhos  x   10-30      MEDICATIONS  (STANDING):  FIRST- Mouthwash  BLM 10 milliLiter(s) Swish and Swallow every 4 hours  FIRST- Mouthwash  BLM 10 milliLiter(s) Swish and Swallow every 4 hours  FIRST- Mouthwash  BLM      heparin   Injectable 5000 Unit(s) SubCutaneous every 8 hours  influenza   Vaccine 0.5 milliLiter(s) IntraMuscular once  pantoprazole  Injectable 40 milliGRAM(s) IV Push daily  piperacillin/tazobactam IVPB.. 3.375 Gram(s) IV Intermittent every 8 hours  potassium phosphate / sodium phosphate Powder (PHOS-NaK) 2 Packet(s) Oral once  sucralfate suspension 1 Gram(s) Oral four times a day    MEDICATIONS  (PRN):  acetaminophen     Tablet .. 650 milliGRAM(s) Oral every 6 hours PRN Temp greater or equal to 38C (100.4F), Mild Pain (1 - 3)  aluminum hydroxide/magnesium hydroxide/simethicone Suspension 30 milliLiter(s) Oral every 4 hours PRN Dyspepsia  ondansetron Injectable 4 milliGRAM(s) IV Push every 6 hours PRN Nausea and/or Vomiting      Physical exam  Gen: Lying comfortably in bed, in NAD  Neuro: A, O x 3  Card:S1S2, RRR  Pulm: CTA bilat  Abd: benign  Ext: warm, no edema    I&O's Summary    29 Oct 2023 07:01  -  30 Oct 2023 07:00  --------------------------------------------------------  IN: 375 mL / OUT: 0 mL / NET: 375 mL      CT Chest 10/28/23:  No oral contrast is shown to have extravasated from the opacified   segments of the esophagus.  Mild to moderate pneumomediastinum, likely related to airway   barotrauma secondary to recent retching.      Assessment  32 yo female w/ no known PMHx presented to the ED BIBEMS from walk in clinic where she was evaluated for n/v and retching.  CT scan on this admission noted for Pneumomediastinum and esophageal thickening for which Thoracic surgery was consulted.  CT chest with oral contrast was negative for extravasation.    PLAN  - Tolerating liquid diet; Advance to Soft diet as tolerates  - remains on antibiotics per primary team  - No further intervention needed at this time  - will continue to follow until patient tolerating regular diet    Discussed with Cardiothoracic Team at AM rounds.  
  HPI:  34 yo female w/ no known PMHx presents to the ED BIBEMS from walk in clinic where she was evaluated for n/v. Pt woke up around 8am with nausea and persistent vomiting, and retching and felt she was having racing heart. Pt states she is having a hard time keeping food and liquid down. No recent sick contacts., denies diarrhea, no burning with urination. Pt is on birth control states she does not get her menstrual periods. Not on any other meds     Review of Systems:  CONSTITUTIONAL: No weakness, fevers or chills  EYES/ENT: No visual changes;  No vertigo or throat pain   NECK: No pain or stiffness  RESPIRATORY: No cough, wheezing, hemoptysis; No shortness of breath,   CARDIOVASCULAR: No chest pain or palpitations  GASTROINTESTINAL: No abdominal or epigastric pain. No nausea, vomiting, or hematemesis; No diarrhea or constipation.   GENITOURINARY: No dysuria, frequency or hematuria  NEUROLOGICAL: No numbness or weakness  SKIN: No itching, burning, rashes, or lesions   All other review of systems is negative unless indicated above    PHYSICAL EXAM:    Vital Signs Last 24 Hrs  T(C): 36.7 (29 Oct 2023 15:24), Max: 37 (28 Oct 2023 22:06)  T(F): 98 (29 Oct 2023 15:24), Max: 98.6 (28 Oct 2023 22:06)  HR: 82 (29 Oct 2023 15:24) (82 - 89)  BP: 123/82 (29 Oct 2023 15:24) (123/82 - 136/98)  BP(mean): 93 (28 Oct 2023 22:06) (93 - 93)  RR: 18 (29 Oct 2023 15:24) (17 - 18)  SpO2: 97% (29 Oct 2023 15:24) (97% - 99%)    Parameters below as of 29 Oct 2023 15:24  Patient On (Oxygen Delivery Method): room air        GENERAL: comfortable   HEAD:  Atraumatic, Normocephalic  EYES: EOMI, PERRLA, conjunctiva and sclera clear  HEENT: Moist mucous membranes  NECK: Supple, No JVD  NERVOUS SYSTEM:  Alert & Oriented X3, Motor Strength 5/5 B/L upper and lower extremities; DTRs 2+ intact and symmetric  CHEST/LUNG: Clear to auscultation bilaterally; No rales, rhonchi, wheezing, or rubs  HEART:S1S2 normal, no murmur  ABDOMEN: Soft, Nontender, Nondistended; Bowel sounds present  GENITOURINARY- Voiding, no palpable bladder  EXTREMITIES:  2+ Peripheral Pulses, No clubbing, cyanosis, or edema  MUSCULOSKELETAL No muscle tenderness, Muscle tone normal, No joint tenderness, no Joint swelling, Joint range of motion-normal  SKIN-no rash, no lesion  PSYCH- Mood stable  LYMPH Node- No palpable lymph node    LABS:                        10.3   5.10  )-----------( 174      ( 29 Oct 2023 07:55 )             29.2     10-29    144  |  109<H>  |  2<L>  ----------------------------<  113<H>  3.0<L>   |  24  |  0.49<L>    Ca    8.9      29 Oct 2023 07:55  Phos  1.6     10-29  Mg     2.2     10-29    TPro  7.5  /  Alb  3.6  /  TBili  0.8  /  DBili  x   /  AST  26  /  ALT  25  /  AlkPhos  45  10-28    PT/INR - ( 27 Oct 2023 18:36 )   PT: 11.0 sec;   INR: 0.97 ratio         PTT - ( 27 Oct 2023 18:36 )  PTT:26.6 sec  Urinalysis Basic - ( 29 Oct 2023 07:55 )    Color: x / Appearance: x / SG: x / pH: x  Gluc: 113 mg/dL / Ketone: x  / Bili: x / Urobili: x   Blood: x / Protein: x / Nitrite: x   Leuk Esterase: x / RBC: x / WBC x   Sq Epi: x / Non Sq Epi: x / Bacteria: x        CAPILLARY BLOOD GLUCOSE        ABG - ( 28 Oct 2023 07:16 )  pH, Arterial: 7.46  pH, Blood: x     /  pCO2: 24    /  pO2: 96    / HCO3: 17    / Base Excess: -4.9  /  SaO2: 99                      Standing medicine  acetaminophen     Tablet .. 650 milliGRAM(s) Oral every 6 hours PRN  aluminum hydroxide/magnesium hydroxide/simethicone Suspension 30 milliLiter(s) Oral every 4 hours PRN  FIRST- Mouthwash  BLM      FIRST- Mouthwash  BLM 10 milliLiter(s) Swish and Swallow every 4 hours  heparin   Injectable 5000 Unit(s) SubCutaneous every 8 hours  influenza   Vaccine 0.5 milliLiter(s) IntraMuscular once  ondansetron Injectable 4 milliGRAM(s) IV Push every 6 hours PRN  pantoprazole  Injectable 40 milliGRAM(s) IV Push daily  piperacillin/tazobactam IVPB.. 3.375 Gram(s) IV Intermittent every 8 hours  potassium chloride    Tablet ER 20 milliEquivalent(s) Oral every 2 hours  potassium phosphate / sodium phosphate Powder (PHOS-NaK) 2 Packet(s) Oral once  potassium phosphate / sodium phosphate Powder (PHOS-NaK) 1 Packet(s) Oral every 4 hours        
  HPI:  34 yo female w/ no known PMHx presents to the ED BIBEMS from walk in clinic where she was evaluated for n/v. Pt woke up around 8am with nausea and persistent vomiting, and retching and felt she was having racing heart. Pt states she is having a hard time keeping food and liquid down. No recent sick contacts., denies diarrhea, no burning with urination. Pt is on birth control states she does not get her menstrual periods. Not on any other meds     Review of Systems:  CONSTITUTIONAL: No weakness, fevers or chills  EYES/ENT: No visual changes;  No vertigo or throat pain   NECK: No pain or stiffness  RESPIRATORY: No cough, wheezing, hemoptysis; No shortness of breath,   CARDIOVASCULAR: No chest pain or palpitations  GASTROINTESTINAL: No abdominal or epigastric pain. No nausea, vomiting, or hematemesis; No diarrhea or constipation.   GENITOURINARY: No dysuria, frequency or hematuria  NEUROLOGICAL: No numbness or weakness  SKIN: No itching, burning, rashes, or lesions   All other review of systems is negative unless indicated above    PHYSICAL EXAM:    Vital Signs Last 24 Hrs  T(C): 36.8 (30 Oct 2023 15:23), Max: 37.2 (30 Oct 2023 07:52)  T(F): 98.2 (30 Oct 2023 15:23), Max: 99 (30 Oct 2023 07:52)  HR: 104 (30 Oct 2023 15:23) (84 - 104)  BP: 122/91 (30 Oct 2023 15:23) (118/83 - 132/82)  BP(mean): 93 (29 Oct 2023 22:28) (93 - 93)  RR: 18 (30 Oct 2023 15:23) (17 - 18)  SpO2: 99% (30 Oct 2023 15:23) (99% - 100%)    Parameters below as of 30 Oct 2023 15:23  Patient On (Oxygen Delivery Method): room air            GENERAL: comfortable   HEAD:  Atraumatic, Normocephalic  EYES: EOMI, PERRLA, conjunctiva and sclera clear  HEENT: Moist mucous membranes  NECK: Supple, No JVD  NERVOUS SYSTEM:  Alert & Oriented X3, Motor Strength 5/5 B/L upper and lower extremities; DTRs 2+ intact and symmetric  CHEST/LUNG: Clear to auscultation bilaterally; No rales, rhonchi, wheezing, or rubs  HEART:S1S2 normal, no murmur  ABDOMEN: Soft, Nontender, Nondistended; Bowel sounds present  GENITOURINARY- Voiding, no palpable bladder  EXTREMITIES:  2+ Peripheral Pulses, No clubbing, cyanosis, or edema  MUSCULOSKELETAL No muscle tenderness, Muscle tone normal, No joint tenderness, no Joint swelling, Joint range of motion-normal  SKIN-no rash, no lesion  PSYCH- Mood stable  LYMPH Node- No palpable lymph node                          10.3   5.10  )-----------( 174      ( 29 Oct 2023 07:55 )             29.2     10-30    144  |  108  |  2<L>  ----------------------------<  121<H>  3.2<L>   |  28  |  0.54    Ca    8.8      30 Oct 2023 08:19  Phos  3.4     10-30  Mg     2.2     10-29    TPro  x   /  Alb  3.5  /  TBili  x   /  DBili  x   /  AST  x   /  ALT  x   /  AlkPhos  x   10-30    LIVER FUNCTIONS - ( 30 Oct 2023 08:19 )  Alb: 3.5 g/dL / Pro: x     / ALK PHOS: x     / ALT: x     / AST: x     / GGT: x             Culture - Urine (collected 27 Oct 2023 17:07)  Source: Clean Catch Clean Catch (Midstream)  Final Report (28 Oct 2023 20:26):    No growth    Culture - Blood (collected 27 Oct 2023 16:57)  Source: .Blood None  Preliminary Report (30 Oct 2023 02:02):    No growth at 48 Hours    Culture - Blood (collected 27 Oct 2023 16:57)  Source: .Blood None  Preliminary Report (30 Oct 2023 02:02):    No growth at 48 Hours        Urinalysis Basic - ( 30 Oct 2023 08:19 )    Color: x / Appearance: x / SG: x / pH: x  Gluc: 121 mg/dL / Ketone: x  / Bili: x / Urobili: x   Blood: x / Protein: x / Nitrite: x   Leuk Esterase: x / RBC: x / WBC x   Sq Epi: x / Non Sq Epi: x / Bacteria: x          CAPILLARY BLOOD GLUCOSE          Culture - Urine (collected 27 Oct 2023 17:07)  Source: Clean Catch Clean Catch (Midstream)  Final Report (28 Oct 2023 20:26):    No growth    Culture - Blood (collected 27 Oct 2023 16:57)  Source: .Blood None  Preliminary Report (30 Oct 2023 02:02):    No growth at 48 Hours    Culture - Blood (collected 27 Oct 2023 16:57)  Source: .Blood None  Preliminary Report (30 Oct 2023 02:02):    No growth at 48 Hours      MEDICATIONS  (STANDING):  FIRST- Mouthwash  BLM 10 milliLiter(s) Swish and Swallow every 4 hours  FIRST- Mouthwash  BLM 10 milliLiter(s) Swish and Swallow every 4 hours  FIRST- Mouthwash  BLM      heparin   Injectable 5000 Unit(s) SubCutaneous every 8 hours  influenza   Vaccine 0.5 milliLiter(s) IntraMuscular once  pantoprazole  Injectable 40 milliGRAM(s) IV Push daily  piperacillin/tazobactam IVPB.. 3.375 Gram(s) IV Intermittent every 8 hours  potassium phosphate / sodium phosphate Powder (PHOS-NaK) 2 Packet(s) Oral once  sucralfate suspension 1 Gram(s) Oral four times a day    MEDICATIONS  (PRN):  acetaminophen     Tablet .. 650 milliGRAM(s) Oral every 6 hours PRN Temp greater or equal to 38C (100.4F), Mild Pain (1 - 3)  aluminum hydroxide/magnesium hydroxide/simethicone Suspension 30 milliLiter(s) Oral every 4 hours PRN Dyspepsia  ondansetron Injectable 4 milliGRAM(s) IV Push every 6 hours PRN Nausea and/or Vomiting  
33y Female s/p     Subjective: Diet advanced to soft yesterday, pt reports feeling of food getting stuck in upper esophagus, clear with drinking water.  Also mild epigastric "discomfort" but no pain.  Currently denies, chest pain, back pain, neck pain, SOB, abd pain, N/V, diarrhea.  Other ROS negative.      T(C): 36.6 (10-31-23 @ 09:01), Max: 36.8 (10-30-23 @ 15:23)  HR: 97 (10-31-23 @ 09:01) (97 - 104)  BP: 113/81 (10-31-23 @ 09:01) (113/81 - 122/91)  ABP: --  ABP(mean): --  RR: 18 (10-31-23 @ 09:01) (16 - 18)  SpO2: 99% (10-31-23 @ 09:01) (98% - 99%)  Wt(kg): --  CVP(mm Hg): --  CO: --  CI: --  PA: --         10-30    144  |  108  |  2<L>  ----------------------------<  121<H>  3.2<L>   |  28  |  0.54    Ca    8.8      30 Oct 2023 08:19  Phos  3.4     10-30    TPro  x   /  Alb  3.5  /  TBili  x   /  DBili  x   /  AST  x   /  ALT  x   /  AlkPhos  x   10-30                           CAPILLARY BLOOD GLUCOSE               CXR:    I&O's Detail      MEDICATIONS  (STANDING):  FIRST- Mouthwash  BLM 10 milliLiter(s) Swish and Swallow every 4 hours  heparin   Injectable 5000 Unit(s) SubCutaneous every 8 hours  influenza   Vaccine 0.5 milliLiter(s) IntraMuscular once  pantoprazole  Injectable 40 milliGRAM(s) IV Push daily  sucralfate suspension 1 Gram(s) Oral four times a day    MEDICATIONS  (PRN):  acetaminophen     Tablet .. 650 milliGRAM(s) Oral every 6 hours PRN Temp greater or equal to 38C (100.4F), Mild Pain (1 - 3)  aluminum hydroxide/magnesium hydroxide/simethicone Suspension 30 milliLiter(s) Oral every 4 hours PRN Dyspepsia  ondansetron Injectable 4 milliGRAM(s) IV Push every 6 hours PRN Nausea and/or Vomiting      Physical Exam  Gen: Lying comfortably in bed, in NAD  Neuro: A, O x 3  Card:S1S2, RRR  Pulm: CTA bilat.  No crepitus.  Abd: benign  Ext: warm, no edema      -----------------------------------------------------------------------------------------------------------------------------------------------------------------------------  -----------------------------------------------------------------------------------------------------------------------------------------------------------------------------
34 yo female w/ no known PMHx presents to the ED BIBEMS from walk in clinic where she was evaluated for n/v. Pt woke up around 8am with nausea and persistent vomiting, and retching and felt she was having racing heart. Pt states she is having a hard time keeping food and liquid down. No recent sick contacts., denies diarrhea, no burning with urination. Pt is on birth control states she does not get her menstrual periods. Not on any other meds   
34 yo female w/ no known PMHx presents to the ED BIBEMS from walk in clinic where she was evaluated for n/v. Pt woke up around 8am with nausea and persistent vomiting, and retching and felt she was having racing heart. Pt states she is having a hard time keeping food and liquid down. No recent sick contacts., denies diarrhea, no burning with urination. Pt is on birth control states she does not get her menstrual periods. Not on any other meds     Review of Systems:  CONSTITUTIONAL: No weakness, fevers or chills  EYES/ENT: No visual changes;  No vertigo or throat pain   NECK: No pain or stiffness  RESPIRATORY: No cough, wheezing, hemoptysis; No shortness of breath,   CARDIOVASCULAR: No chest pain or palpitations  GASTROINTESTINAL: No abdominal or epigastric pain. No nausea, vomiting, or hematemesis; No diarrhea or constipation.   GENITOURINARY: No dysuria, frequency or hematuria  NEUROLOGICAL: No numbness or weakness  SKIN: No itching, burning, rashes, or lesions   All other review of systems is negative unless indicated above    PHYSICAL EXAM:      GENERAL: comfortable   EYES: EOMI, conjunctiva and sclera clear  HEENT: Moist mucous membranes  NECK: Supple, No JVD  NERVOUS SYSTEM:  Alert & Oriented X3, Motor Strength 5/5 B/L upper and lower extremities  CHEST/LUNG: Clear to auscultation bilaterally; No rales, rhonchi, wheezing, or rubs  HEART:S1S2 normal, no murmur  ABDOMEN: Soft, Nontender, Nondistended; Bowel sounds present  GENITOURINARY- Voiding, no palpable bladder  EXTREMITIES:  2+ Peripheral Pulses, No clubbing, cyanosis, or edema  MUSCULOSKELETAL No muscle tenderness, Muscle tone normal, No joint tenderness, no Joint swelling, Joint range of motion-normal  SKIN-no rash, no lesion  PSYCH- Mood stable  LYMPH Node- No palpable lymph node  
CTS Progress Note    HPI:    S:    Pt seen and examined  HD # 3  FULL CODE  PMHx none  presents to the ED BIBEMS from walk in clinic where she was evaluated for n/v. Pt woke up around 8am yesterday with nausea and persistent vomiting, and retching and felt she was having racing heart. Pt states she is having a hard time keeping food and liquid down. No recent sick contacts., denies diarrhea, no burning with urination. Pt is on birth control states she does not get her menstrual periods. Not on any other meds     10/28: Feeling a bit better. Pending esophogram. CT findings reviewed.  10/29: Continues to improve, c/o of sore throat. CT with PO contrast done yesterday w/o extravasation, started on CLD, tolerating. GI consult placed by medicine.     ROS: + sore throat, hoarse voice  Remainder of systems reviewed, negative        Allergies    No Known Allergies    Intolerances    Gluten (Unknown)  lactose (Unknown)      MEDICATIONS  (STANDING):  FIRST- Mouthwash  BLM      FIRST- Mouthwash  BLM 10 milliLiter(s) Swish and Swallow once  FIRST- Mouthwash  BLM 10 milliLiter(s) Swish and Swallow every 4 hours  heparin   Injectable 5000 Unit(s) SubCutaneous every 8 hours  influenza   Vaccine 0.5 milliLiter(s) IntraMuscular once  pantoprazole  Injectable 40 milliGRAM(s) IV Push daily  piperacillin/tazobactam IVPB.. 3.375 Gram(s) IV Intermittent every 8 hours  potassium phosphate / sodium phosphate Powder (PHOS-NaK) 2 Packet(s) Oral once  potassium phosphate / sodium phosphate Powder (PHOS-NaK) 1 Packet(s) Oral every 4 hours    MEDICATIONS  (PRN):  acetaminophen     Tablet .. 650 milliGRAM(s) Oral every 6 hours PRN Temp greater or equal to 38C (100.4F), Mild Pain (1 - 3)  aluminum hydroxide/magnesium hydroxide/simethicone Suspension 30 milliLiter(s) Oral every 4 hours PRN Dyspepsia  ondansetron Injectable 4 milliGRAM(s) IV Push every 6 hours PRN Nausea and/or Vomiting      Drug Dosing Weight  Height (cm): 160 (23 Jul 2022 15:44)  Weight (kg): 62.1 (27 Oct 2023 12:36)  BMI (kg/m2): 24.3 (27 Oct 2023 12:36)  BSA (m2): 1.65 (27 Oct 2023 12:36)    PAST MEDICAL & SURGICAL HISTORY:  No pertinent past medical history      No significant past surgical history          FAMILY HISTORY:  No pertinent family history in first degree relatives        ROS: See HPI; otherwise, all systems reviewed and negative.    O:    ICU Vital Signs Last 24 Hrs  T(C): 36.6 (29 Oct 2023 08:03), Max: 37.1 (28 Oct 2023 15:33)  T(F): 97.9 (29 Oct 2023 08:03), Max: 98.8 (28 Oct 2023 15:33)  HR: 84 (29 Oct 2023 08:03) (84 - 95)  BP: 136/98 (29 Oct 2023 08:03) (123/79 - 136/98)  BP(mean): 93 (28 Oct 2023 22:06) (93 - 93)  ABP: --  ABP(mean): --  RR: 17 (29 Oct 2023 08:03) (17 - 18)  SpO2: 99% (28 Oct 2023 22:06) (96% - 99%)    O2 Parameters below as of 28 Oct 2023 22:06  Patient On (Oxygen Delivery Method): room air            ABG - ( 28 Oct 2023 07:16 )  pH, Arterial: 7.46  pH, Blood: x     /  pCO2: 24    /  pO2: 96    / HCO3: 17    / Base Excess: -4.9  /  SaO2: 99                  I&O's Detail    28 Oct 2023 07:01  -  29 Oct 2023 07:00  --------------------------------------------------------  IN:    IV PiggyBack: 250 mL    Lactated Ringers: 700 mL  Total IN: 950 mL    OUT:    Indwelling Catheter - Urethral (mL): 680 mL  Total OUT: 680 mL    Total NET: 270 mL              PE:    Adult lying in bed  No JVD trachea midline  Normocephalic, atraumatic  S1S2+  CTA B/L  Abd soft NTND  No leg swelling/edema noted  Awake and alert  Skin pink, warm    LABS:    CBC Full  -  ( 29 Oct 2023 07:55 )  WBC Count : 5.10 K/uL  RBC Count : 2.99 M/uL  Hemoglobin : 10.3 g/dL  Hematocrit : 29.2 %  Platelet Count - Automated : 174 K/uL  Mean Cell Volume : 97.7 fl  Mean Cell Hemoglobin : 34.4 pg  Mean Cell Hemoglobin Concentration : 35.3 gm/dL  Auto Neutrophil # : x  Auto Lymphocyte # : x  Auto Monocyte # : x  Auto Eosinophil # : x  Auto Basophil # : x  Auto Neutrophil % : x  Auto Lymphocyte % : x  Auto Monocyte % : x  Auto Eosinophil % : x  Auto Basophil % : x    10-29    144  |  109<H>  |  2<L>  ----------------------------<  113<H>  3.0<L>   |  24  |  0.49<L>    Ca    8.9      29 Oct 2023 07:55  Phos  1.6     10-29  Mg     2.2     10-29    TPro  7.5  /  Alb  3.6  /  TBili  0.8  /  DBili  x   /  AST  26  /  ALT  25  /  AlkPhos  45  10-28    PT/INR - ( 27 Oct 2023 18:36 )   PT: 11.0 sec;   INR: 0.97 ratio         PTT - ( 27 Oct 2023 18:36 )  PTT:26.6 sec  Urinalysis Basic - ( 29 Oct 2023 07:55 )    Color: x / Appearance: x / SG: x / pH: x  Gluc: 113 mg/dL / Ketone: x  / Bili: x / Urobili: x   Blood: x / Protein: x / Nitrite: x   Leuk Esterase: x / RBC: x / WBC x   Sq Epi: x / Non Sq Epi: x / Bacteria: x      CAPILLARY BLOOD GLUCOSE        CARDIAC MARKERS ( 27 Oct 2023 13:19 )  x     / x     / 189 U/L / x     / x          LIVER FUNCTIONS - ( 28 Oct 2023 05:47 )  Alb: 3.6 g/dL / Pro: 7.5 gm/dL / ALK PHOS: 45 U/L / ALT: 25 U/L / AST: 26 U/L / GGT: x               
ICU  Note    HPI:    S:    Pt seen and examined  HD # 2  FULL CODE  PMHx none  presents to the ED BIBEMS from walk in clinic where she was evaluated for n/v. Pt woke up around 8am yesterday with nausea and persistent vomiting, and retching and felt she was having racing heart. Pt states she is having a hard time keeping food and liquid down. No recent sick contacts., denies diarrhea, no burning with urination. Pt is on birth control states she does not get her menstrual periods. Not on any other meds     10/28: Feeling a bit better. Pending esophogram. CT findings reviewed.    ROS: + sore throat, hoarse voice  Remainder of systems reviewed, negative    Allergies    No Known Allergies    Intolerances    Gluten (Unknown)  lactose (Unknown)      MEDICATIONS  (STANDING):  heparin   Injectable 5000 Unit(s) SubCutaneous every 8 hours  influenza   Vaccine 0.5 milliLiter(s) IntraMuscular once  lactated ringers. 1000 milliLiter(s) (100 mL/Hr) IV Continuous <Continuous>  pantoprazole  Injectable 40 milliGRAM(s) IV Push daily  piperacillin/tazobactam IVPB.. 3.375 Gram(s) IV Intermittent every 8 hours  potassium chloride    Tablet ER 40 milliEquivalent(s) Oral once    MEDICATIONS  (PRN):  acetaminophen     Tablet .. 650 milliGRAM(s) Oral every 6 hours PRN Temp greater or equal to 38C (100.4F), Mild Pain (1 - 3)  aluminum hydroxide/magnesium hydroxide/simethicone Suspension 30 milliLiter(s) Oral every 4 hours PRN Dyspepsia  ondansetron Injectable 4 milliGRAM(s) IV Push every 6 hours PRN Nausea and/or Vomiting      Drug Dosing Weight  Height (cm): 160 (23 Jul 2022 15:44)  Weight (kg): 62.1 (27 Oct 2023 12:36)  BMI (kg/m2): 24.3 (27 Oct 2023 12:36)  BSA (m2): 1.65 (27 Oct 2023 12:36)    PAST MEDICAL & SURGICAL HISTORY:    No pertinent past medical history    No significant past surgical history    FAMILY HISTORY:    No pertinent family history in first degree relatives      ROS: See HPI; otherwise, all systems reviewed and negative.    O:    ICU Vital Signs Last 24 Hrs  T(C): 37.2 (28 Oct 2023 06:18), Max: 38 (27 Oct 2023 20:48)  T(F): 99 (28 Oct 2023 06:18), Max: 100.4 (27 Oct 2023 20:48)  HR: 94 (28 Oct 2023 09:00) (89 - 150)  BP: 133/72 (28 Oct 2023 05:00) (105/59 - 148/81)  BP(mean): 91 (28 Oct 2023 05:00) (80 - 101)  ABP: --  ABP(mean): --  RR: 19 (28 Oct 2023 09:00) (17 - 30)  SpO2: 94% (28 Oct 2023 05:00) (94% - 100%)    O2 Parameters below as of 27 Oct 2023 20:32  Patient On (Oxygen Delivery Method): room air            ABG - ( 28 Oct 2023 07:16 )  pH, Arterial: 7.46  pH, Blood: x     /  pCO2: 24    /  pO2: 96    / HCO3: 17    / Base Excess: -4.9  /  SaO2: 99                  I&O's Detail    27 Oct 2023 07:01  -  28 Oct 2023 07:00  --------------------------------------------------------  IN:    IV PiggyBack: 450 mL    Lactated Ringers: 1000 mL  Total IN: 1450 mL    OUT:    Indwelling Catheter - Urethral (mL): 2650 mL  Total OUT: 2650 mL    Total NET: -1200 mL      28 Oct 2023 07:01  -  28 Oct 2023 12:26  --------------------------------------------------------  IN:    IV PiggyBack: 250 mL  Total IN: 250 mL    OUT:    Indwelling Catheter - Urethral (mL): 180 mL  Total OUT: 180 mL    Total NET: 70 mL              PE:    Adult lying in bed  No JVD trachea midline  Normocephalic, atraumatic  S1S2+  CTA B/L  Abd soft NTND  No leg swelling/edema noted  Awake and alert  Skin pink, warm    LABS:    CBC Full  -  ( 28 Oct 2023 05:47 )  WBC Count : 9.64 K/uL  RBC Count : 3.23 M/uL  Hemoglobin : 11.2 g/dL  Hematocrit : 31.8 %  Platelet Count - Automated : 207 K/uL  Mean Cell Volume : 98.5 fl  Mean Cell Hemoglobin : 34.7 pg  Mean Cell Hemoglobin Concentration : 35.2 gm/dL  Auto Neutrophil # : 8.04 K/uL  Auto Lymphocyte # : 0.99 K/uL  Auto Monocyte # : 0.55 K/uL  Auto Eosinophil # : 0.00 K/uL  Auto Basophil # : 0.03 K/uL  Auto Neutrophil % : 83.4 %  Auto Lymphocyte % : 10.3 %  Auto Monocyte % : 5.7 %  Auto Eosinophil % : 0.0 %  Auto Basophil % : 0.3 %    10-28    142  |  111<H>  |  6<L>  ----------------------------<  139<H>  3.4<L>   |  20<L>  |  0.81    Ca    8.8      28 Oct 2023 05:47  Phos  1.4     10-28  Mg     2.1     10-28    TPro  7.5  /  Alb  3.6  /  TBili  0.8  /  DBili  x   /  AST  26  /  ALT  25  /  AlkPhos  45  10-28    PT/INR - ( 27 Oct 2023 18:36 )   PT: 11.0 sec;   INR: 0.97 ratio         PTT - ( 27 Oct 2023 18:36 )  PTT:26.6 sec  Urinalysis Basic - ( 28 Oct 2023 05:47 )    Color: x / Appearance: x / SG: x / pH: x  Gluc: 139 mg/dL / Ketone: x  / Bili: x / Urobili: x   Blood: x / Protein: x / Nitrite: x   Leuk Esterase: x / RBC: x / WBC x   Sq Epi: x / Non Sq Epi: x / Bacteria: x      CAPILLARY BLOOD GLUCOSE      POCT Blood Glucose.: 177 mg/dL (27 Oct 2023 18:47)    CARDIAC MARKERS ( 27 Oct 2023 13:19 )  x     / x     / 189 U/L / x     / x          LIVER FUNCTIONS - ( 28 Oct 2023 05:47 )  Alb: 3.6 g/dL / Pro: 7.5 gm/dL / ALK PHOS: 45 U/L / ALT: 25 U/L / AST: 26 U/L / GGT: x               
NEPHROLOGY INTERVAL HPI/OVERNIGHT EVENTS:  10-28-23 @ 11:00    10/28 events noted, + THC, for possible egd   HPI:  32 yo female w/ no known PMHx presents to the ED BIBEMS from walk in clinic where she was evaluated for n/v. Pt woke up around 8am yesterday with nausea and persistent vomiting, and retching and felt she was having racing heart. Pt states she is having a hard time keeping food and liquid down. No recent sick contacts., denies diarrhea, no burning with urination. Pt is on birth control states she does not get her menstrual periods. Not on any other meds     Pt was examined and assessed at the bedside for ICU c/s  Pt denies recent ingestion of toxic solutions, ETOH, elicit drugs, OTC ETOH ot OTC Tylenol or ASA , denies h/o diabetes, denies suicidal ideation   There is a documented admission in  where the pt was admitted on the ICU service and was in ETOH withdrawal and DT's and is known to hospital and for ETOH abuse    Pt noted to have new JALEN, hyponatremia 127, hyperkalemia potassium 6.2, Bicarb 7. Ct scan with pneumomediastinum and mural thickening of the esophagus   '  Pt admitted to the ICU for workup if acidosis and renal failure - r/o toxicology as cause      (27 Oct 2023 17:41)      Patient is a 33y old  Female who presents with a chief complaint of JALEN Hyperkalemia (27 Oct 2023 15:28)      MEDICATIONS  (STANDING):  heparin   Injectable 5000 Unit(s) SubCutaneous every 8 hours  influenza   Vaccine 0.5 milliLiter(s) IntraMuscular once  lactated ringers. 1000 milliLiter(s) (100 mL/Hr) IV Continuous <Continuous>  pantoprazole  Injectable 40 milliGRAM(s) IV Push daily  piperacillin/tazobactam IVPB.. 3.375 Gram(s) IV Intermittent every 8 hours  potassium chloride    Tablet ER 40 milliEquivalent(s) Oral once    MEDICATIONS  (PRN):  acetaminophen     Tablet .. 650 milliGRAM(s) Oral every 6 hours PRN Temp greater or equal to 38C (100.4F), Mild Pain (1 - 3)  aluminum hydroxide/magnesium hydroxide/simethicone Suspension 30 milliLiter(s) Oral every 4 hours PRN Dyspepsia  ondansetron Injectable 4 milliGRAM(s) IV Push every 6 hours PRN Nausea and/or Vomiting      Allergies    No Known Allergies    Intolerances    Gluten (Unknown)  lactose (Unknown)      I&O's Detail    27 Oct 2023 07:01  -  28 Oct 2023 07:00  --------------------------------------------------------  IN:    IV PiggyBack: 450 mL    Lactated Ringers: 1000 mL  Total IN: 1450 mL    OUT:    Indwelling Catheter - Urethral (mL): 2650 mL  Total OUT: 2650 mL    Total NET: -1200 mL      28 Oct 2023 07:01  -  28 Oct 2023 11:00  --------------------------------------------------------  IN:    IV PiggyBack: 250 mL  Total IN: 250 mL    OUT:    Indwelling Catheter - Urethral (mL): 180 mL  Total OUT: 180 mL    Total NET: 70 mL            Vital Signs Last 24 Hrs  T(C): 37.2 (28 Oct 2023 06:18), Max: 38 (27 Oct 2023 20:48)  T(F): 99 (28 Oct 2023 06:18), Max: 100.4 (27 Oct 2023 20:48)  HR: 94 (28 Oct 2023 09:00) (89 - 150)  BP: 133/72 (28 Oct 2023 05:00) (105/59 - 148/81)  BP(mean): 91 (28 Oct 2023 05:00) (80 - 101)  RR: 19 (28 Oct 2023 09:00) (17 - 30)  SpO2: 94% (28 Oct 2023 05:00) (94% - 100%)    Parameters below as of 27 Oct 2023 20:32  Patient On (Oxygen Delivery Method): room air      Daily     Daily Weight in k.2 (28 Oct 2023 06:18)    PHYSICAL EXAM:  General: alert. awake Ox3  HEENT: MMM  CV: s1s2 rrr  LUNGS: B/L CTA  EXT: no edema    LABS:                        11.2   9.64  )-----------( 207      ( 28 Oct 2023 05:47 )             31.8     10-28    142  |  111<H>  |  6<L>  ----------------------------<  139<H>  3.4<L>   |  20<L>  |  0.81    Ca    8.8      28 Oct 2023 05:47  Phos  1.4     10-28  Mg     2.1     10-28    TPro  7.5  /  Alb  3.6  /  TBili  0.8  /  DBili  x   /  AST  26  /  ALT  25  /  AlkPhos  45  10-28    PT/INR - ( 27 Oct 2023 18:36 )   PT: 11.0 sec;   INR: 0.97 ratio         PTT - ( 27 Oct 2023 18:36 )  PTT:26.6 sec  Urinalysis Basic - ( 28 Oct 2023 05:47 )    Color: x / Appearance: x / SG: x / pH: x  Gluc: 139 mg/dL / Ketone: x  / Bili: x / Urobili: x   Blood: x / Protein: x / Nitrite: x   Leuk Esterase: x / RBC: x / WBC x   Sq Epi: x / Non Sq Epi: x / Bacteria: x      Magnesium: 2.1 mg/dL (10-28 @ 05:47)  Phosphorus: 1.4 mg/dL (10-28 @ 05:47)  Phosphorus: 0.8 mg/dL (10-27 @ 18:36)  Magnesium: 2.2 mg/dL (10-27 @ 13:19)  Magnesium: 1.9 mg/dL (10-27 @ 13:19)  Phosphorus: 0.6 mg/dL (10-27 @ 13:19)    ABG - ( 28 Oct 2023 07:16 )  pH, Arterial: 7.46  pH, Blood: x     /  pCO2: 24    /  pO2: 96    / HCO3: 17    / Base Excess: -4.9  /  SaO2: 99                
Patient is a 33y old  Female who presents with a chief complaint of JALEN Hyperkalemia (27 Oct 2023 15:28)    24 hour events:     Allergies    No Known Allergies    Intolerances    Gluten (Unknown)  lactose (Unknown)    REVIEW OF SYSTEMS: SEE BELOW       ICU Vital Signs Last 24 Hrs  T(C): 37.1 (27 Oct 2023 15:37), Max: 37.1 (27 Oct 2023 12:36)  T(F): 98.8 (27 Oct 2023 15:37), Max: 98.8 (27 Oct 2023 15:37)  HR: 150 (27 Oct 2023 15:37) (147 - 150)  BP: 139/76 (27 Oct 2023 15:37) (105/59 - 139/76)  BP(mean): 91 (27 Oct 2023 15:37) (91 - 91)  ABP: --  ABP(mean): --  RR: 18 (27 Oct 2023 15:37) (18 - 20)  SpO2: 100% (27 Oct 2023 15:37) (100% - 100%)    O2 Parameters below as of 27 Oct 2023 15:37  Patient On (Oxygen Delivery Method): room air            CAPILLARY BLOOD GLUCOSE          I&O's Summary    27 Oct 2023 07:01  -  27 Oct 2023 18:46  --------------------------------------------------------  IN: 0 mL / OUT: 950 mL / NET: -950 mL            MEDICATIONS  (STANDING):  heparin   Injectable 5000 Unit(s) SubCutaneous every 8 hours  lactated ringers Bolus 1000 milliLiter(s) IV Bolus once  lactated ringers. 1000 milliLiter(s) (100 mL/Hr) IV Continuous <Continuous>  pantoprazole  Injectable 40 milliGRAM(s) IV Push two times a day  piperacillin/tazobactam IVPB.. 3.375 Gram(s) IV Intermittent every 8 hours      MEDICATIONS  (PRN):  ondansetron Injectable 4 milliGRAM(s) IV Push every 6 hours PRN Nausea and/or Vomiting      PHYSICAL EXAM: SEE BELOW                          13.2   24.36 )-----------( 285      ( 27 Oct 2023 13:19 )             40.1     Bands 1.0    10-27    127<L>  |  97  |  23  ----------------------------<  211<H>  6.2<HH>   |  7<LL>  |  2.48<H>    Ca    8.1<L>      27 Oct 2023 13:19  Mg     2.2     10-27    TPro  9.3<H>  /  Alb  4.4  /  TBili  1.0  /  DBili  x   /  AST    /  ALT  32  /  AlkPhos  64  10-27    Lactate 1.4           10-27 @ 16:57    Lactate 2.1           10-27 @ 13:19      CARDIAC MARKERS ( 27 Oct 2023 13:19 )  x     / x     / 189 U/L / x     / x            Urinalysis Basic - ( 27 Oct 2023 17:07 )    Color: Yellow / Appearance: Clear / S.011 / pH: x  Gluc: x / Ketone: >=160 mg/dL  / Bili: Negative / Urobili: 0.2 mg/dL   Blood: x / Protein: Trace mg/dL / Nitrite: Negative   Leuk Esterase: Negative / RBC: 0 /HPF / WBC 0 /HPF   Sq Epi: x / Non Sq Epi: 0 /HPF / Bacteria: Negative /HPF              
Patient is a 33y old  Female who presents with a chief complaint of JALEN Hyperkalemia (27 Oct 2023 15:28)    24 hour events:     Allergies    No Known Allergies    Intolerances    Gluten (Unknown)  lactose (Unknown)    REVIEW OF SYSTEMS: SEE BELOW       ICU Vital Signs Last 24 Hrs  T(C): 37.2 (28 Oct 2023 06:18), Max: 38 (27 Oct 2023 20:48)  T(F): 99 (28 Oct 2023 06:18), Max: 100.4 (27 Oct 2023 20:48)  HR: 98 (28 Oct 2023 05:00) (98 - 150)  BP: 133/72 (28 Oct 2023 05:00) (105/59 - 148/81)  BP(mean): 91 (28 Oct 2023 05:00) (80 - 101)  ABP: --  ABP(mean): --  RR: 17 (28 Oct 2023 05:00) (17 - 30)  SpO2: 94% (28 Oct 2023 05:00) (94% - 100%)    O2 Parameters below as of 27 Oct 2023 20:32  Patient On (Oxygen Delivery Method): room air            CAPILLARY BLOOD GLUCOSE      POCT Blood Glucose.: 177 mg/dL (27 Oct 2023 18:47)      I&O's Summary    27 Oct 2023 07:01  -  28 Oct 2023 07:00  --------------------------------------------------------  IN: 1450 mL / OUT: 2650 mL / NET: -1200 mL    28 Oct 2023 07:01  -  28 Oct 2023 09:19  --------------------------------------------------------  IN: 250 mL / OUT: 180 mL / NET: 70 mL            MEDICATIONS  (STANDING):  heparin   Injectable 5000 Unit(s) SubCutaneous every 8 hours  influenza   Vaccine 0.5 milliLiter(s) IntraMuscular once  lactated ringers. 1000 milliLiter(s) (100 mL/Hr) IV Continuous <Continuous>  pantoprazole  Injectable 40 milliGRAM(s) IV Push two times a day  piperacillin/tazobactam IVPB.. 3.375 Gram(s) IV Intermittent every 8 hours  potassium chloride    Tablet ER 40 milliEquivalent(s) Oral once      MEDICATIONS  (PRN):  ondansetron Injectable 4 milliGRAM(s) IV Push every 6 hours PRN Nausea and/or Vomiting      PHYSICAL EXAM: SEE BELOW                          11.2   9.64  )-----------( 207      ( 28 Oct 2023 05:47 )             31.8     Bands 1.0    10-28    142  |  111<H>  |  6<L>  ----------------------------<  139<H>  3.4<L>   |  20<L>  |  0.81    Ca    8.8      28 Oct 2023 05:47  Phos  1.4     10-28  Mg     2.1     10-28    TPro  7.5  /  Alb  3.6  /  TBili  0.8  /  DBili  x   /  AST  26  /  ALT  25  /  AlkPhos  45  10-28    Lactate 1.4           10-27 @ 16:57    Lactate 2.1           10-27 @ 13:19      CARDIAC MARKERS ( 27 Oct 2023 13:19 )  x     / x     / 189 U/L / x     / x          PT/INR - ( 27 Oct 2023 18:36 )   PT: 11.0 sec;   INR: 0.97 ratio         PTT - ( 27 Oct 2023 18:36 )  PTT:26.6 sec  Urinalysis Basic - ( 28 Oct 2023 05:47 )    Color: x / Appearance: x / SG: x / pH: x  Gluc: 139 mg/dL / Ketone: x  / Bili: x / Urobili: x   Blood: x / Protein: x / Nitrite: x   Leuk Esterase: x / RBC: x / WBC x   Sq Epi: x / Non Sq Epi: x / Bacteria: x

## 2023-11-01 NOTE — PROGRESS NOTE ADULT - ASSESSMENT
#probably gastritis/esohagitis  -ct ppi   -magic mouth wash- swiss and swollow   - Discharge home today  - Follow up with GI for outpatient EGD    # hypokalemia replace it and monitor it     #Hypophosphatemia -replace it     #JALEN- resolved     #dvt pr

## 2023-11-01 NOTE — PROGRESS NOTE ADULT - PROVIDER SPECIALTY LIST ADULT
Hospitalist
Nephrology
Thoracic Surgery
Hospitalist
Thoracic Surgery
Thoracic Surgery
Critical Care
Critical Care

## 2023-11-02 LAB
CULTURE RESULTS: SIGNIFICANT CHANGE UP
SPECIMEN SOURCE: SIGNIFICANT CHANGE UP

## 2023-11-07 DIAGNOSIS — K20.90 ESOPHAGITIS, UNSPECIFIED WITHOUT BLEEDING: ICD-10-CM

## 2023-11-07 DIAGNOSIS — N17.9 ACUTE KIDNEY FAILURE, UNSPECIFIED: ICD-10-CM

## 2023-11-07 DIAGNOSIS — E87.5 HYPERKALEMIA: ICD-10-CM

## 2023-11-07 DIAGNOSIS — J98.2 INTERSTITIAL EMPHYSEMA: ICD-10-CM

## 2023-11-07 DIAGNOSIS — E86.0 DEHYDRATION: ICD-10-CM

## 2023-11-07 DIAGNOSIS — E87.1 HYPO-OSMOLALITY AND HYPONATREMIA: ICD-10-CM

## 2023-11-07 DIAGNOSIS — E83.39 OTHER DISORDERS OF PHOSPHORUS METABOLISM: ICD-10-CM

## 2023-11-07 DIAGNOSIS — E87.20 ACIDOSIS, UNSPECIFIED: ICD-10-CM

## 2023-11-07 DIAGNOSIS — R11.2 NAUSEA WITH VOMITING, UNSPECIFIED: ICD-10-CM

## 2023-11-07 DIAGNOSIS — E87.6 HYPOKALEMIA: ICD-10-CM

## 2024-02-16 ENCOUNTER — NON-APPOINTMENT (OUTPATIENT)
Age: 34
End: 2024-02-16

## 2025-04-07 NOTE — ED ADULT NURSE NOTE - NSICDXNOPASTSURGICALHX_GEN_ALL_CORE
[Provider aware of all medications taken (including OTC)] : Patient stated provider is aware of all medications ~he/she~ is taking including OTC  [Patient/caregiver able to verbalize understanding of medications, including indications and side effects] : Patient/caregiver able to verbalize understanding of medications, including indications and side effects <-- Click to add NO significant Past Surgical History